# Patient Record
Sex: FEMALE | Race: WHITE | HISPANIC OR LATINO | URBAN - METROPOLITAN AREA
[De-identification: names, ages, dates, MRNs, and addresses within clinical notes are randomized per-mention and may not be internally consistent; named-entity substitution may affect disease eponyms.]

---

## 2017-01-30 ENCOUNTER — OUTPATIENT (OUTPATIENT)
Dept: OUTPATIENT SERVICES | Facility: HOSPITAL | Age: 52
LOS: 1 days | End: 2017-01-30
Payer: MEDICAID

## 2017-01-30 ENCOUNTER — APPOINTMENT (OUTPATIENT)
Dept: FAMILY MEDICINE | Facility: HOSPITAL | Age: 52
End: 2017-01-30

## 2017-01-30 VITALS
HEART RATE: 80 BPM | WEIGHT: 156 LBS | TEMPERATURE: 97.8 F | SYSTOLIC BLOOD PRESSURE: 129 MMHG | RESPIRATION RATE: 14 BRPM | DIASTOLIC BLOOD PRESSURE: 80 MMHG | BODY MASS INDEX: 25.18 KG/M2 | OXYGEN SATURATION: 100 %

## 2017-01-30 DIAGNOSIS — Z00.00 ENCOUNTER FOR GENERAL ADULT MEDICAL EXAMINATION WITHOUT ABNORMAL FINDINGS: ICD-10-CM

## 2017-01-30 PROCEDURE — G0463: CPT

## 2017-02-13 ENCOUNTER — EMERGENCY (EMERGENCY)
Facility: HOSPITAL | Age: 52
LOS: 1 days | Discharge: HOME | End: 2017-02-13
Admitting: EMERGENCY MEDICINE
Payer: COMMERCIAL

## 2017-02-13 ENCOUNTER — OTHER (OUTPATIENT)
Age: 52
End: 2017-02-13

## 2017-02-13 PROCEDURE — 81025 URINE PREGNANCY TEST: CPT

## 2017-02-13 PROCEDURE — 82272 OCCULT BLD FECES 1-3 TESTS: CPT

## 2017-02-13 PROCEDURE — 99284 EMERGENCY DEPT VISIT MOD MDM: CPT | Mod: 25

## 2017-02-13 PROCEDURE — 36415 COLL VENOUS BLD VENIPUNCTURE: CPT

## 2017-02-13 PROCEDURE — 99053 MED SERV 10PM-8AM 24 HR FAC: CPT

## 2017-02-13 PROCEDURE — 82962 GLUCOSE BLOOD TEST: CPT

## 2017-02-13 PROCEDURE — 70450 CT HEAD/BRAIN W/O DYE: CPT | Mod: 26

## 2017-02-13 PROCEDURE — 85610 PROTHROMBIN TIME: CPT

## 2017-02-13 PROCEDURE — 71010: CPT | Mod: 26

## 2017-02-13 PROCEDURE — 85730 THROMBOPLASTIN TIME PARTIAL: CPT

## 2017-02-13 PROCEDURE — 85027 COMPLETE CBC AUTOMATED: CPT

## 2017-02-13 PROCEDURE — 85379 FIBRIN DEGRADATION QUANT: CPT

## 2017-02-13 PROCEDURE — 70450 CT HEAD/BRAIN W/O DYE: CPT

## 2017-02-13 PROCEDURE — 93010 ELECTROCARDIOGRAM REPORT: CPT

## 2017-02-13 PROCEDURE — 82550 ASSAY OF CK (CPK): CPT

## 2017-02-13 PROCEDURE — 71045 X-RAY EXAM CHEST 1 VIEW: CPT

## 2017-02-13 PROCEDURE — 99285 EMERGENCY DEPT VISIT HI MDM: CPT | Mod: 25

## 2017-02-13 PROCEDURE — 84484 ASSAY OF TROPONIN QUANT: CPT

## 2017-02-13 PROCEDURE — 93005 ELECTROCARDIOGRAM TRACING: CPT

## 2017-02-14 ENCOUNTER — APPOINTMENT (OUTPATIENT)
Dept: FAMILY MEDICINE | Facility: HOSPITAL | Age: 52
End: 2017-02-14

## 2017-02-14 ENCOUNTER — OUTPATIENT (OUTPATIENT)
Dept: OUTPATIENT SERVICES | Facility: HOSPITAL | Age: 52
LOS: 1 days | End: 2017-02-14
Payer: MEDICAID

## 2017-02-14 VITALS
OXYGEN SATURATION: 100 % | TEMPERATURE: 97 F | WEIGHT: 155 LBS | RESPIRATION RATE: 14 BRPM | HEIGHT: 66 IN | HEART RATE: 61 BPM | SYSTOLIC BLOOD PRESSURE: 139 MMHG | DIASTOLIC BLOOD PRESSURE: 74 MMHG | BODY MASS INDEX: 24.91 KG/M2

## 2017-02-14 DIAGNOSIS — D64.9 ANEMIA, UNSPECIFIED: ICD-10-CM

## 2017-02-14 DIAGNOSIS — G51.0 BELL'S PALSY: ICD-10-CM

## 2017-02-14 DIAGNOSIS — Z82.3 FAMILY HISTORY OF STROKE: ICD-10-CM

## 2017-02-14 DIAGNOSIS — Z82.49 FAMILY HISTORY OF ISCHEMIC HEART DISEASE AND OTHER DISEASES OF THE CIRCULATORY SYSTEM: ICD-10-CM

## 2017-02-14 PROCEDURE — 83550 IRON BINDING TEST: CPT

## 2017-02-14 PROCEDURE — 82728 ASSAY OF FERRITIN: CPT

## 2017-02-14 PROCEDURE — 85025 COMPLETE CBC W/AUTO DIFF WBC: CPT

## 2017-02-14 PROCEDURE — 36415 COLL VENOUS BLD VENIPUNCTURE: CPT

## 2017-02-14 PROCEDURE — G0463: CPT

## 2017-02-15 ENCOUNTER — APPOINTMENT (OUTPATIENT)
Dept: OBGYN | Facility: HOSPITAL | Age: 52
End: 2017-02-15

## 2017-02-15 DIAGNOSIS — I16.1 HYPERTENSIVE EMERGENCY: ICD-10-CM

## 2017-02-15 DIAGNOSIS — I10 ESSENTIAL (PRIMARY) HYPERTENSION: ICD-10-CM

## 2017-03-02 ENCOUNTER — APPOINTMENT (OUTPATIENT)
Dept: FAMILY MEDICINE | Facility: HOSPITAL | Age: 52
End: 2017-03-02

## 2017-03-10 ENCOUNTER — APPOINTMENT (OUTPATIENT)
Dept: ULTRASOUND IMAGING | Facility: HOSPITAL | Age: 52
End: 2017-03-10

## 2017-03-10 ENCOUNTER — APPOINTMENT (OUTPATIENT)
Dept: MAMMOGRAPHY | Facility: HOSPITAL | Age: 52
End: 2017-03-10

## 2017-03-10 ENCOUNTER — OUTPATIENT (OUTPATIENT)
Dept: OUTPATIENT SERVICES | Facility: HOSPITAL | Age: 52
LOS: 1 days | End: 2017-03-10
Payer: MEDICAID

## 2017-03-10 DIAGNOSIS — Z12.31 ENCOUNTER FOR SCREENING MAMMOGRAM FOR MALIGNANT NEOPLASM OF BREAST: ICD-10-CM

## 2017-03-10 DIAGNOSIS — N60.11 DIFFUSE CYSTIC MASTOPATHY OF RIGHT BREAST: ICD-10-CM

## 2017-03-10 DIAGNOSIS — N60.12 DIFFUSE CYSTIC MASTOPATHY OF LEFT BREAST: ICD-10-CM

## 2017-03-10 PROCEDURE — 76641 ULTRASOUND BREAST COMPLETE: CPT

## 2017-03-10 PROCEDURE — 77063 BREAST TOMOSYNTHESIS BI: CPT

## 2017-03-10 PROCEDURE — 77067 SCR MAMMO BI INCL CAD: CPT

## 2017-03-16 ENCOUNTER — OUTPATIENT (OUTPATIENT)
Dept: OUTPATIENT SERVICES | Facility: HOSPITAL | Age: 52
LOS: 1 days | End: 2017-03-16
Payer: COMMERCIAL

## 2017-03-16 ENCOUNTER — APPOINTMENT (OUTPATIENT)
Dept: FAMILY MEDICINE | Facility: HOSPITAL | Age: 52
End: 2017-03-16

## 2017-03-16 VITALS
BODY MASS INDEX: 25.23 KG/M2 | HEIGHT: 66 IN | WEIGHT: 157 LBS | HEART RATE: 74 BPM | TEMPERATURE: 97.5 F | DIASTOLIC BLOOD PRESSURE: 82 MMHG | RESPIRATION RATE: 14 BRPM | OXYGEN SATURATION: 98 % | SYSTOLIC BLOOD PRESSURE: 137 MMHG

## 2017-03-16 DIAGNOSIS — Z00.00 ENCOUNTER FOR GENERAL ADULT MEDICAL EXAMINATION WITHOUT ABNORMAL FINDINGS: ICD-10-CM

## 2017-03-16 PROCEDURE — 87081 CULTURE SCREEN ONLY: CPT

## 2017-03-16 PROCEDURE — G0463: CPT

## 2017-03-28 ENCOUNTER — OUTPATIENT (OUTPATIENT)
Dept: OUTPATIENT SERVICES | Facility: HOSPITAL | Age: 52
LOS: 1 days | End: 2017-03-28
Payer: COMMERCIAL

## 2017-03-28 ENCOUNTER — APPOINTMENT (OUTPATIENT)
Dept: GASTROENTEROLOGY | Facility: HOSPITAL | Age: 52
End: 2017-03-28

## 2017-03-28 VITALS
BODY MASS INDEX: 25.23 KG/M2 | DIASTOLIC BLOOD PRESSURE: 98 MMHG | HEART RATE: 91 BPM | RESPIRATION RATE: 14 BRPM | WEIGHT: 157 LBS | HEIGHT: 66 IN | SYSTOLIC BLOOD PRESSURE: 163 MMHG

## 2017-03-28 DIAGNOSIS — D64.9 ANEMIA, UNSPECIFIED: ICD-10-CM

## 2017-03-28 DIAGNOSIS — K31.9 DISEASE OF STOMACH AND DUODENUM, UNSPECIFIED: ICD-10-CM

## 2017-03-28 PROCEDURE — G0463: CPT

## 2017-04-12 ENCOUNTER — RESULT REVIEW (OUTPATIENT)
Age: 52
End: 2017-04-12

## 2017-04-13 ENCOUNTER — OUTPATIENT (OUTPATIENT)
Dept: OUTPATIENT SERVICES | Facility: HOSPITAL | Age: 52
LOS: 1 days | End: 2017-04-13
Payer: COMMERCIAL

## 2017-04-13 DIAGNOSIS — D64.9 ANEMIA, UNSPECIFIED: ICD-10-CM

## 2017-04-13 PROCEDURE — 45378 DIAGNOSTIC COLONOSCOPY: CPT

## 2017-04-13 PROCEDURE — 43239 EGD BIOPSY SINGLE/MULTIPLE: CPT

## 2017-04-13 PROCEDURE — 43239 EGD BIOPSY SINGLE/MULTIPLE: CPT | Mod: GC

## 2017-04-13 PROCEDURE — 45378 DIAGNOSTIC COLONOSCOPY: CPT | Mod: GC

## 2017-04-14 LAB — SURGICAL PATHOLOGY STUDY: SIGNIFICANT CHANGE UP

## 2017-04-15 ENCOUNTER — OUTPATIENT (OUTPATIENT)
Dept: OUTPATIENT SERVICES | Facility: HOSPITAL | Age: 52
LOS: 1 days | End: 2017-04-15
Payer: COMMERCIAL

## 2017-04-15 ENCOUNTER — APPOINTMENT (OUTPATIENT)
Dept: FAMILY MEDICINE | Facility: HOSPITAL | Age: 52
End: 2017-04-15

## 2017-04-15 VITALS
WEIGHT: 157 LBS | HEIGHT: 63 IN | BODY MASS INDEX: 27.82 KG/M2 | TEMPERATURE: 97.8 F | HEART RATE: 58 BPM | DIASTOLIC BLOOD PRESSURE: 83 MMHG | RESPIRATION RATE: 14 BRPM | SYSTOLIC BLOOD PRESSURE: 159 MMHG | OXYGEN SATURATION: 100 %

## 2017-04-15 VITALS — DIASTOLIC BLOOD PRESSURE: 88 MMHG | SYSTOLIC BLOOD PRESSURE: 151 MMHG

## 2017-04-15 DIAGNOSIS — Z00.00 ENCOUNTER FOR GENERAL ADULT MEDICAL EXAMINATION WITHOUT ABNORMAL FINDINGS: ICD-10-CM

## 2017-04-15 PROCEDURE — G0463: CPT

## 2017-04-15 RX ORDER — DEXTROMETHORPHAN POLISTIREX 30 MG/5ML
30 SUSPENSION, EXTENDED RELEASE ORAL
Qty: 1 | Refills: 0 | Status: DISCONTINUED | COMMUNITY
Start: 2017-03-16 | End: 2017-04-15

## 2017-05-09 ENCOUNTER — OUTPATIENT (OUTPATIENT)
Dept: OUTPATIENT SERVICES | Facility: HOSPITAL | Age: 52
LOS: 1 days | End: 2017-05-09
Payer: COMMERCIAL

## 2017-05-09 ENCOUNTER — APPOINTMENT (OUTPATIENT)
Dept: CT IMAGING | Facility: HOSPITAL | Age: 52
End: 2017-05-09

## 2017-05-09 DIAGNOSIS — D25.9 LEIOMYOMA OF UTERUS, UNSPECIFIED: ICD-10-CM

## 2017-05-09 PROCEDURE — 74177 CT ABD & PELVIS W/CONTRAST: CPT

## 2017-06-12 ENCOUNTER — APPOINTMENT (OUTPATIENT)
Dept: FAMILY MEDICINE | Facility: HOSPITAL | Age: 52
End: 2017-06-12

## 2017-06-12 ENCOUNTER — OUTPATIENT (OUTPATIENT)
Dept: OUTPATIENT SERVICES | Facility: HOSPITAL | Age: 52
LOS: 1 days | End: 2017-06-12
Payer: COMMERCIAL

## 2017-06-12 VITALS
TEMPERATURE: 97.7 F | OXYGEN SATURATION: 99 % | BODY MASS INDEX: 29.23 KG/M2 | HEART RATE: 79 BPM | WEIGHT: 165 LBS | SYSTOLIC BLOOD PRESSURE: 166 MMHG | DIASTOLIC BLOOD PRESSURE: 79 MMHG | RESPIRATION RATE: 16 BRPM

## 2017-06-12 DIAGNOSIS — R60.0 LOCALIZED EDEMA: ICD-10-CM

## 2017-06-12 DIAGNOSIS — D64.9 ANEMIA, UNSPECIFIED: ICD-10-CM

## 2017-06-13 PROCEDURE — 82728 ASSAY OF FERRITIN: CPT

## 2017-06-13 PROCEDURE — 80053 COMPREHEN METABOLIC PANEL: CPT

## 2017-06-13 PROCEDURE — 83921 ORGANIC ACID SINGLE QUANT: CPT

## 2017-06-13 PROCEDURE — 84443 ASSAY THYROID STIM HORMONE: CPT

## 2017-06-13 PROCEDURE — 36415 COLL VENOUS BLD VENIPUNCTURE: CPT

## 2017-06-13 PROCEDURE — 84439 ASSAY OF FREE THYROXINE: CPT

## 2017-06-13 PROCEDURE — 82746 ASSAY OF FOLIC ACID SERUM: CPT

## 2017-06-13 PROCEDURE — 83550 IRON BINDING TEST: CPT

## 2017-06-13 PROCEDURE — 93306 TTE W/DOPPLER COMPLETE: CPT

## 2017-06-13 PROCEDURE — 82607 VITAMIN B-12: CPT

## 2017-06-13 PROCEDURE — 85045 AUTOMATED RETICULOCYTE COUNT: CPT

## 2017-06-13 PROCEDURE — 85025 COMPLETE CBC W/AUTO DIFF WBC: CPT

## 2017-06-13 PROCEDURE — G0463: CPT

## 2017-06-13 PROCEDURE — 83090 ASSAY OF HOMOCYSTEINE: CPT

## 2017-06-13 PROCEDURE — 80061 LIPID PANEL: CPT

## 2017-06-13 PROCEDURE — 83036 HEMOGLOBIN GLYCOSYLATED A1C: CPT

## 2017-06-19 ENCOUNTER — APPOINTMENT (OUTPATIENT)
Dept: FAMILY MEDICINE | Facility: HOSPITAL | Age: 52
End: 2017-06-19

## 2017-06-19 ENCOUNTER — OUTPATIENT (OUTPATIENT)
Dept: OUTPATIENT SERVICES | Facility: HOSPITAL | Age: 52
LOS: 1 days | End: 2017-06-19
Payer: COMMERCIAL

## 2017-06-19 VITALS
RESPIRATION RATE: 16 BRPM | DIASTOLIC BLOOD PRESSURE: 78 MMHG | SYSTOLIC BLOOD PRESSURE: 125 MMHG | WEIGHT: 161 LBS | HEIGHT: 63 IN | BODY MASS INDEX: 28.53 KG/M2 | OXYGEN SATURATION: 99 % | TEMPERATURE: 97 F | HEART RATE: 84 BPM

## 2017-06-19 DIAGNOSIS — D64.9 ANEMIA, UNSPECIFIED: ICD-10-CM

## 2017-06-20 PROCEDURE — 80074 ACUTE HEPATITIS PANEL: CPT

## 2017-06-20 PROCEDURE — 87389 HIV-1 AG W/HIV-1&-2 AB AG IA: CPT

## 2017-06-20 PROCEDURE — 36415 COLL VENOUS BLD VENIPUNCTURE: CPT

## 2017-06-20 PROCEDURE — G0463: CPT

## 2017-07-12 ENCOUNTER — OUTPATIENT (OUTPATIENT)
Dept: OUTPATIENT SERVICES | Facility: HOSPITAL | Age: 52
LOS: 1 days | End: 2017-07-12
Payer: COMMERCIAL

## 2017-07-12 ENCOUNTER — APPOINTMENT (OUTPATIENT)
Dept: FAMILY MEDICINE | Facility: HOSPITAL | Age: 52
End: 2017-07-12

## 2017-07-12 VITALS
SYSTOLIC BLOOD PRESSURE: 136 MMHG | HEART RATE: 70 BPM | WEIGHT: 161 LBS | TEMPERATURE: 98 F | DIASTOLIC BLOOD PRESSURE: 89 MMHG | RESPIRATION RATE: 16 BRPM | BODY MASS INDEX: 28.53 KG/M2 | HEIGHT: 63 IN | OXYGEN SATURATION: 99 %

## 2017-07-12 DIAGNOSIS — N60.01 SOLITARY CYST OF RIGHT BREAST: ICD-10-CM

## 2017-07-12 PROCEDURE — G0463: CPT

## 2017-07-18 ENCOUNTER — APPOINTMENT (OUTPATIENT)
Dept: FAMILY MEDICINE | Facility: HOSPITAL | Age: 52
End: 2017-07-18

## 2017-07-18 RX ORDER — VALSARTAN 160 MG/1
160 TABLET, COATED ORAL DAILY
Qty: 30 | Refills: 0 | Status: DISCONTINUED | COMMUNITY
Start: 2017-07-12 | End: 2017-07-18

## 2017-07-25 ENCOUNTER — APPOINTMENT (OUTPATIENT)
Dept: SURGICAL ONCOLOGY | Facility: CLINIC | Age: 52
End: 2017-07-25

## 2017-07-25 VITALS
HEART RATE: 61 BPM | SYSTOLIC BLOOD PRESSURE: 162 MMHG | DIASTOLIC BLOOD PRESSURE: 80 MMHG | HEIGHT: 64 IN | WEIGHT: 157 LBS | BODY MASS INDEX: 26.8 KG/M2 | OXYGEN SATURATION: 100 %

## 2017-07-25 DIAGNOSIS — N60.01 SOLITARY CYST OF RIGHT BREAST: ICD-10-CM

## 2017-09-06 ENCOUNTER — APPOINTMENT (OUTPATIENT)
Dept: FAMILY MEDICINE | Facility: HOSPITAL | Age: 52
End: 2017-09-06

## 2017-09-06 ENCOUNTER — OUTPATIENT (OUTPATIENT)
Dept: OUTPATIENT SERVICES | Facility: HOSPITAL | Age: 52
LOS: 1 days | End: 2017-09-06
Payer: COMMERCIAL

## 2017-09-06 ENCOUNTER — MED ADMIN CHARGE (OUTPATIENT)
Age: 52
End: 2017-09-06

## 2017-09-06 VITALS
RESPIRATION RATE: 14 BRPM | OXYGEN SATURATION: 98 % | SYSTOLIC BLOOD PRESSURE: 170 MMHG | BODY MASS INDEX: 29.02 KG/M2 | WEIGHT: 170 LBS | DIASTOLIC BLOOD PRESSURE: 107 MMHG | HEIGHT: 64 IN | HEART RATE: 63 BPM | TEMPERATURE: 97.5 F

## 2017-09-06 DIAGNOSIS — R60.0 LOCALIZED EDEMA: ICD-10-CM

## 2017-09-06 DIAGNOSIS — Z23 ENCOUNTER FOR IMMUNIZATION: ICD-10-CM

## 2017-09-06 DIAGNOSIS — M94.0 CHONDROCOSTAL JUNCTION SYNDROME [TIETZE]: ICD-10-CM

## 2017-09-06 PROCEDURE — G0463: CPT

## 2017-09-06 PROCEDURE — G0008: CPT

## 2017-10-23 ENCOUNTER — APPOINTMENT (OUTPATIENT)
Dept: FAMILY MEDICINE | Facility: HOSPITAL | Age: 52
End: 2017-10-23

## 2017-10-26 ENCOUNTER — RX RENEWAL (OUTPATIENT)
Age: 52
End: 2017-10-26

## 2017-11-08 ENCOUNTER — APPOINTMENT (OUTPATIENT)
Dept: FAMILY MEDICINE | Facility: HOSPITAL | Age: 52
End: 2017-11-08

## 2017-12-07 ENCOUNTER — EMERGENCY (EMERGENCY)
Facility: HOSPITAL | Age: 52
LOS: 1 days | Discharge: HOME | End: 2017-12-07
Admitting: EMERGENCY MEDICINE
Payer: COMMERCIAL

## 2017-12-07 DIAGNOSIS — Z90.49 ACQUIRED ABSENCE OF OTHER SPECIFIED PARTS OF DIGESTIVE TRACT: ICD-10-CM

## 2017-12-07 DIAGNOSIS — K21.9 GASTRO-ESOPHAGEAL REFLUX DISEASE WITHOUT ESOPHAGITIS: ICD-10-CM

## 2017-12-07 DIAGNOSIS — Z98.890 OTHER SPECIFIED POSTPROCEDURAL STATES: ICD-10-CM

## 2017-12-07 DIAGNOSIS — Z79.899 OTHER LONG TERM (CURRENT) DRUG THERAPY: ICD-10-CM

## 2017-12-07 DIAGNOSIS — J20.8 ACUTE BRONCHITIS DUE TO OTHER SPECIFIED ORGANISMS: ICD-10-CM

## 2017-12-07 DIAGNOSIS — Z88.8 ALLERGY STATUS TO OTHER DRUGS, MEDICAMENTS AND BIOLOGICAL SUBSTANCES: ICD-10-CM

## 2017-12-07 DIAGNOSIS — R51 HEADACHE: ICD-10-CM

## 2017-12-07 DIAGNOSIS — J06.9 ACUTE UPPER RESPIRATORY INFECTION, UNSPECIFIED: ICD-10-CM

## 2017-12-07 DIAGNOSIS — I10 ESSENTIAL (PRIMARY) HYPERTENSION: ICD-10-CM

## 2017-12-07 PROCEDURE — 71046 X-RAY EXAM CHEST 2 VIEWS: CPT

## 2017-12-07 PROCEDURE — 71020: CPT | Mod: 26

## 2017-12-07 PROCEDURE — 99284 EMERGENCY DEPT VISIT MOD MDM: CPT | Mod: 25

## 2017-12-07 PROCEDURE — 99284 EMERGENCY DEPT VISIT MOD MDM: CPT

## 2018-01-10 ENCOUNTER — OUTPATIENT (OUTPATIENT)
Dept: OUTPATIENT SERVICES | Facility: HOSPITAL | Age: 53
LOS: 1 days | End: 2018-01-10
Payer: COMMERCIAL

## 2018-01-10 ENCOUNTER — APPOINTMENT (OUTPATIENT)
Dept: FAMILY MEDICINE | Facility: HOSPITAL | Age: 53
End: 2018-01-10

## 2018-01-10 VITALS
BODY MASS INDEX: 28.15 KG/M2 | WEIGHT: 164 LBS | DIASTOLIC BLOOD PRESSURE: 90 MMHG | RESPIRATION RATE: 12 BRPM | TEMPERATURE: 98.1 F | SYSTOLIC BLOOD PRESSURE: 162 MMHG | OXYGEN SATURATION: 100 % | HEART RATE: 63 BPM

## 2018-01-10 DIAGNOSIS — Z00.00 ENCOUNTER FOR GENERAL ADULT MEDICAL EXAMINATION WITHOUT ABNORMAL FINDINGS: ICD-10-CM

## 2018-01-10 DIAGNOSIS — D25.9 LEIOMYOMA OF UTERUS, UNSPECIFIED: ICD-10-CM

## 2018-01-10 PROCEDURE — G0463: CPT

## 2018-01-10 RX ORDER — OMEPRAZOLE 40 MG/1
40 CAPSULE, DELAYED RELEASE ORAL
Qty: 1 | Refills: 1 | Status: DISCONTINUED | COMMUNITY
Start: 2017-02-14 | End: 2018-01-10

## 2018-01-10 RX ORDER — NAPROXEN 500 MG/1
500 TABLET ORAL
Qty: 20 | Refills: 0 | Status: DISCONTINUED | COMMUNITY
Start: 2017-09-06 | End: 2018-01-10

## 2018-01-11 DIAGNOSIS — D25.9 LEIOMYOMA OF UTERUS, UNSPECIFIED: ICD-10-CM

## 2018-01-11 DIAGNOSIS — I10 ESSENTIAL (PRIMARY) HYPERTENSION: ICD-10-CM

## 2018-01-15 ENCOUNTER — FORM ENCOUNTER (OUTPATIENT)
Age: 53
End: 2018-01-15

## 2018-01-16 ENCOUNTER — OUTPATIENT (OUTPATIENT)
Dept: OUTPATIENT SERVICES | Facility: HOSPITAL | Age: 53
LOS: 1 days | End: 2018-01-16
Payer: COMMERCIAL

## 2018-01-16 DIAGNOSIS — D25.9 LEIOMYOMA OF UTERUS, UNSPECIFIED: ICD-10-CM

## 2018-01-16 DIAGNOSIS — D64.9 ANEMIA, UNSPECIFIED: ICD-10-CM

## 2018-01-16 PROCEDURE — 76830 TRANSVAGINAL US NON-OB: CPT | Mod: 26

## 2018-01-16 PROCEDURE — 76856 US EXAM PELVIC COMPLETE: CPT

## 2018-01-16 PROCEDURE — 85025 COMPLETE CBC W/AUTO DIFF WBC: CPT

## 2018-01-16 PROCEDURE — 76856 US EXAM PELVIC COMPLETE: CPT | Mod: 26

## 2018-01-16 PROCEDURE — 36415 COLL VENOUS BLD VENIPUNCTURE: CPT

## 2018-01-16 PROCEDURE — 76830 TRANSVAGINAL US NON-OB: CPT

## 2018-02-05 ENCOUNTER — OUTPATIENT (OUTPATIENT)
Dept: OUTPATIENT SERVICES | Facility: HOSPITAL | Age: 53
LOS: 1 days | End: 2018-02-05
Payer: COMMERCIAL

## 2018-02-05 ENCOUNTER — APPOINTMENT (OUTPATIENT)
Dept: FAMILY MEDICINE | Facility: HOSPITAL | Age: 53
End: 2018-02-05

## 2018-02-05 VITALS
SYSTOLIC BLOOD PRESSURE: 181 MMHG | WEIGHT: 164 LBS | OXYGEN SATURATION: 100 % | TEMPERATURE: 97.5 F | RESPIRATION RATE: 12 BRPM | BODY MASS INDEX: 28.15 KG/M2 | HEART RATE: 64 BPM | DIASTOLIC BLOOD PRESSURE: 92 MMHG

## 2018-02-05 DIAGNOSIS — Z00.00 ENCOUNTER FOR GENERAL ADULT MEDICAL EXAMINATION WITHOUT ABNORMAL FINDINGS: ICD-10-CM

## 2018-02-05 DIAGNOSIS — D64.9 ANEMIA, UNSPECIFIED: ICD-10-CM

## 2018-02-05 PROCEDURE — 82728 ASSAY OF FERRITIN: CPT

## 2018-02-05 PROCEDURE — 84466 ASSAY OF TRANSFERRIN: CPT

## 2018-02-05 PROCEDURE — G0463: CPT

## 2018-02-05 PROCEDURE — 83550 IRON BINDING TEST: CPT

## 2018-02-05 RX ORDER — AZITHROMYCIN 250 MG/1
250 TABLET, FILM COATED ORAL
Qty: 6 | Refills: 0 | Status: COMPLETED | COMMUNITY
Start: 2017-12-07

## 2018-02-05 RX ORDER — BENZONATATE 100 MG/1
100 CAPSULE ORAL
Qty: 21 | Refills: 0 | Status: COMPLETED | COMMUNITY
Start: 2017-12-07

## 2018-02-05 RX ORDER — IBUPROFEN 600 MG/1
600 TABLET, FILM COATED ORAL
Qty: 30 | Refills: 0 | Status: COMPLETED | COMMUNITY
Start: 2017-12-07

## 2018-03-08 ENCOUNTER — MEDICATION RENEWAL (OUTPATIENT)
Age: 53
End: 2018-03-08

## 2018-03-12 ENCOUNTER — APPOINTMENT (OUTPATIENT)
Dept: OBGYN | Facility: HOSPITAL | Age: 53
End: 2018-03-12

## 2018-03-28 ENCOUNTER — APPOINTMENT (OUTPATIENT)
Dept: OBGYN | Facility: HOSPITAL | Age: 53
End: 2018-03-28
Payer: COMMERCIAL

## 2018-03-28 ENCOUNTER — LABORATORY RESULT (OUTPATIENT)
Age: 53
End: 2018-03-28

## 2018-03-28 ENCOUNTER — OUTPATIENT (OUTPATIENT)
Dept: OUTPATIENT SERVICES | Facility: HOSPITAL | Age: 53
LOS: 1 days | End: 2018-03-28

## 2018-03-28 VITALS
HEART RATE: 87 BPM | BODY MASS INDEX: 29.06 KG/M2 | DIASTOLIC BLOOD PRESSURE: 88 MMHG | WEIGHT: 170.19 LBS | HEIGHT: 64 IN | SYSTOLIC BLOOD PRESSURE: 158 MMHG

## 2018-03-28 DIAGNOSIS — Z01.419 ENCOUNTER FOR GYNECOLOGICAL EXAMINATION (GENERAL) (ROUTINE) WITHOUT ABNORMAL FINDINGS: ICD-10-CM

## 2018-03-28 PROCEDURE — 87110 CHLAMYDIA CULTURE: CPT | Mod: NC

## 2018-03-28 PROCEDURE — 99396 PREV VISIT EST AGE 40-64: CPT | Mod: NC

## 2018-03-29 LAB
C TRACH RRNA SPEC QL NAA+PROBE: SIGNIFICANT CHANGE UP
N GONORRHOEA RRNA SPEC QL NAA+PROBE: SIGNIFICANT CHANGE UP
SPECIMEN SOURCE: SIGNIFICANT CHANGE UP

## 2018-04-03 ENCOUNTER — RX RENEWAL (OUTPATIENT)
Age: 53
End: 2018-04-03

## 2018-05-14 ENCOUNTER — RX RENEWAL (OUTPATIENT)
Age: 53
End: 2018-05-14

## 2018-06-06 ENCOUNTER — APPOINTMENT (OUTPATIENT)
Dept: FAMILY MEDICINE | Facility: HOSPITAL | Age: 53
End: 2018-06-06

## 2018-06-18 ENCOUNTER — RX RENEWAL (OUTPATIENT)
Age: 53
End: 2018-06-18

## 2018-07-31 ENCOUNTER — APPOINTMENT (OUTPATIENT)
Dept: SURGICAL ONCOLOGY | Facility: CLINIC | Age: 53
End: 2018-07-31

## 2018-09-14 ENCOUNTER — EMERGENCY (EMERGENCY)
Facility: HOSPITAL | Age: 53
LOS: 1 days | Discharge: HOME | End: 2018-09-14
Attending: EMERGENCY MEDICINE | Admitting: EMERGENCY MEDICINE
Payer: COMMERCIAL

## 2018-09-14 VITALS
HEIGHT: 64 IN | WEIGHT: 164.02 LBS | TEMPERATURE: 98 F | RESPIRATION RATE: 18 BRPM | OXYGEN SATURATION: 100 % | DIASTOLIC BLOOD PRESSURE: 95 MMHG | HEART RATE: 57 BPM | SYSTOLIC BLOOD PRESSURE: 179 MMHG

## 2018-09-14 VITALS
SYSTOLIC BLOOD PRESSURE: 168 MMHG | HEART RATE: 60 BPM | RESPIRATION RATE: 18 BRPM | OXYGEN SATURATION: 100 % | DIASTOLIC BLOOD PRESSURE: 78 MMHG

## 2018-09-14 DIAGNOSIS — R11.10 VOMITING, UNSPECIFIED: ICD-10-CM

## 2018-09-14 LAB
ALBUMIN SERPL ELPH-MCNC: 3.7 G/DL — SIGNIFICANT CHANGE UP (ref 3.3–5)
ALP SERPL-CCNC: 122 U/L — HIGH (ref 40–120)
ALT FLD-CCNC: 38 U/L DA — SIGNIFICANT CHANGE UP (ref 10–45)
ANION GAP SERPL CALC-SCNC: 11 MMOL/L — SIGNIFICANT CHANGE UP (ref 5–17)
AST SERPL-CCNC: 33 U/L — SIGNIFICANT CHANGE UP (ref 10–40)
BASOPHILS # BLD AUTO: 0.1 K/UL — SIGNIFICANT CHANGE UP (ref 0–0.2)
BILIRUB SERPL-MCNC: 0.5 MG/DL — SIGNIFICANT CHANGE UP (ref 0.2–1.2)
BUN SERPL-MCNC: 22 MG/DL — SIGNIFICANT CHANGE UP (ref 7–23)
CALCIUM SERPL-MCNC: 9.3 MG/DL — SIGNIFICANT CHANGE UP (ref 8.4–10.5)
CHLORIDE SERPL-SCNC: 103 MMOL/L — SIGNIFICANT CHANGE UP (ref 96–108)
CO2 SERPL-SCNC: 25 MMOL/L — SIGNIFICANT CHANGE UP (ref 22–31)
CREAT SERPL-MCNC: 0.64 MG/DL — SIGNIFICANT CHANGE UP (ref 0.5–1.3)
EOSINOPHIL # BLD AUTO: 0.3 K/UL — SIGNIFICANT CHANGE UP (ref 0–0.5)
EOSINOPHIL NFR BLD AUTO: 5 % — SIGNIFICANT CHANGE UP (ref 0–6)
GLUCOSE SERPL-MCNC: 138 MG/DL — HIGH (ref 70–99)
HCT VFR BLD CALC: 46.1 % — HIGH (ref 34.5–45)
HGB BLD-MCNC: 15.9 G/DL — HIGH (ref 11.5–15.5)
LIDOCAIN IGE QN: 652 U/L — HIGH (ref 73–393)
LYMPHOCYTES # BLD AUTO: 3.6 K/UL — HIGH (ref 1–3.3)
LYMPHOCYTES # BLD AUTO: 37 % — SIGNIFICANT CHANGE UP (ref 13–44)
MCHC RBC-ENTMCNC: 29.4 PG — SIGNIFICANT CHANGE UP (ref 27–34)
MCHC RBC-ENTMCNC: 34.4 GM/DL — SIGNIFICANT CHANGE UP (ref 32–36)
MCV RBC AUTO: 85.6 FL — SIGNIFICANT CHANGE UP (ref 80–100)
MONOCYTES # BLD AUTO: 0.8 K/UL — SIGNIFICANT CHANGE UP (ref 0–0.9)
MONOCYTES NFR BLD AUTO: 8 % — SIGNIFICANT CHANGE UP (ref 2–14)
NEUTROPHILS # BLD AUTO: 4.9 K/UL — SIGNIFICANT CHANGE UP (ref 1.8–7.4)
NEUTROPHILS NFR BLD AUTO: 49 % — SIGNIFICANT CHANGE UP (ref 43–77)
NEUTS BAND # BLD: 1 % — SIGNIFICANT CHANGE UP (ref 0–8)
NRBC # BLD: 1 /100 — HIGH (ref 0–0)
PLAT MORPH BLD: NORMAL — SIGNIFICANT CHANGE UP
PLATELET # BLD AUTO: 347 K/UL — SIGNIFICANT CHANGE UP (ref 150–400)
POTASSIUM SERPL-MCNC: 3.6 MMOL/L — SIGNIFICANT CHANGE UP (ref 3.5–5.3)
POTASSIUM SERPL-SCNC: 3.6 MMOL/L — SIGNIFICANT CHANGE UP (ref 3.5–5.3)
PROT SERPL-MCNC: 8.1 G/DL — SIGNIFICANT CHANGE UP (ref 6–8.3)
RBC # BLD: 5.39 M/UL — HIGH (ref 3.8–5.2)
RBC # FLD: 11.4 % — SIGNIFICANT CHANGE UP (ref 10.3–14.5)
RBC BLD AUTO: NORMAL — SIGNIFICANT CHANGE UP
SODIUM SERPL-SCNC: 139 MMOL/L — SIGNIFICANT CHANGE UP (ref 135–145)
WBC # BLD: 9.6 K/UL — SIGNIFICANT CHANGE UP (ref 3.8–10.5)
WBC # FLD AUTO: 9.6 K/UL — SIGNIFICANT CHANGE UP (ref 3.8–10.5)

## 2018-09-14 PROCEDURE — 99284 EMERGENCY DEPT VISIT MOD MDM: CPT | Mod: 25

## 2018-09-14 PROCEDURE — 96374 THER/PROPH/DIAG INJ IV PUSH: CPT | Mod: XU

## 2018-09-14 PROCEDURE — 80053 COMPREHEN METABOLIC PANEL: CPT

## 2018-09-14 PROCEDURE — 70450 CT HEAD/BRAIN W/O DYE: CPT | Mod: 26

## 2018-09-14 PROCEDURE — 96361 HYDRATE IV INFUSION ADD-ON: CPT

## 2018-09-14 PROCEDURE — 96375 TX/PRO/DX INJ NEW DRUG ADDON: CPT

## 2018-09-14 PROCEDURE — 85027 COMPLETE CBC AUTOMATED: CPT

## 2018-09-14 PROCEDURE — 70450 CT HEAD/BRAIN W/O DYE: CPT

## 2018-09-14 PROCEDURE — 83690 ASSAY OF LIPASE: CPT

## 2018-09-14 RX ORDER — ONDANSETRON 8 MG/1
4 TABLET, FILM COATED ORAL ONCE
Qty: 0 | Refills: 0 | Status: COMPLETED | OUTPATIENT
Start: 2018-09-14 | End: 2018-09-14

## 2018-09-14 RX ORDER — DIPHENHYDRAMINE HCL 50 MG
25 CAPSULE ORAL ONCE
Qty: 0 | Refills: 0 | Status: COMPLETED | OUTPATIENT
Start: 2018-09-14 | End: 2018-09-14

## 2018-09-14 RX ORDER — SODIUM CHLORIDE 9 MG/ML
1000 INJECTION INTRAMUSCULAR; INTRAVENOUS; SUBCUTANEOUS ONCE
Qty: 0 | Refills: 0 | Status: COMPLETED | OUTPATIENT
Start: 2018-09-14 | End: 2018-09-14

## 2018-09-14 RX ADMIN — Medication 25 MILLIGRAM(S): at 02:43

## 2018-09-14 RX ADMIN — SODIUM CHLORIDE 1000 MILLILITER(S): 9 INJECTION INTRAMUSCULAR; INTRAVENOUS; SUBCUTANEOUS at 03:30

## 2018-09-14 RX ADMIN — SODIUM CHLORIDE 2000 MILLILITER(S): 9 INJECTION INTRAMUSCULAR; INTRAVENOUS; SUBCUTANEOUS at 02:43

## 2018-09-14 RX ADMIN — ONDANSETRON 4 MILLIGRAM(S): 8 TABLET, FILM COATED ORAL at 02:43

## 2018-09-14 NOTE — ED PROVIDER NOTE - OBJECTIVE STATEMENT
Pertinent PMH/PSH/FHx/SHx and Review of Systems contained within:  52 y/o F with h/o HTN presents to ed c/o sudden onset dizziness with multiple episodes of vomiting

## 2018-09-14 NOTE — ED ADULT NURSE NOTE - NSIMPLEMENTINTERV_GEN_ALL_ED
Implemented All Universal Safety Interventions:  [place] to call system. Call bell, personal items and telephone within reach. Instruct patient to call for assistance. Room bathroom lighting operational. Non-slip footwear when patient is off stretcher. Physically safe environment: no spills, clutter or unnecessary equipment. Stretcher in lowest position, wheels locked, appropriate side rails in place.

## 2018-09-14 NOTE — ED ADULT NURSE NOTE - OBJECTIVE STATEMENT
C/O vomiting and dizziness that started 1 hour ago. Pt has a hx of vertigo. Pt states she also had a headache. She took Tylenol extra strength pain reliever, headache has subsided.

## 2018-10-02 ENCOUNTER — RX RENEWAL (OUTPATIENT)
Age: 53
End: 2018-10-02

## 2018-11-26 ENCOUNTER — OUTPATIENT (OUTPATIENT)
Dept: OUTPATIENT SERVICES | Facility: HOSPITAL | Age: 53
LOS: 1 days | End: 2018-11-26
Payer: COMMERCIAL

## 2018-11-26 ENCOUNTER — APPOINTMENT (OUTPATIENT)
Dept: FAMILY MEDICINE | Facility: HOSPITAL | Age: 53
End: 2018-11-26

## 2018-11-26 VITALS
BODY MASS INDEX: 27.64 KG/M2 | SYSTOLIC BLOOD PRESSURE: 164 MMHG | TEMPERATURE: 97.9 F | OXYGEN SATURATION: 100 % | DIASTOLIC BLOOD PRESSURE: 101 MMHG | WEIGHT: 161 LBS | HEART RATE: 75 BPM

## 2018-11-26 VITALS — SYSTOLIC BLOOD PRESSURE: 154 MMHG | DIASTOLIC BLOOD PRESSURE: 100 MMHG

## 2018-11-26 DIAGNOSIS — Z00.00 ENCOUNTER FOR GENERAL ADULT MEDICAL EXAMINATION WITHOUT ABNORMAL FINDINGS: ICD-10-CM

## 2018-11-26 PROCEDURE — G0463: CPT

## 2018-11-26 NOTE — HEALTH RISK ASSESSMENT
[No falls in past year] : Patient reported no falls in the past year [0] : 2) Feeling down, depressed, or hopeless: Not at all (0) [] : No [[id number]] : 0

## 2018-11-26 NOTE — HISTORY OF PRESENT ILLNESS
[FreeTextEntry8] : 52 y/o female with a PMH of HTN, HLD, and iron-deficiency anemia presenting with a 9 day history of headaches. States that she has 2-3 headaches per day, and that they last for a few seconds before resolving on their own. The pain is located in her bilateral temples and also bilaterally in occiput. Associated with bilateral lacrimation, jaw pain, pressure "behind eyes", and vision changes, which were described as a blackness the covers the field of vision and then goes away, followed by blurry vision and difficulty reading. Denies any photophobia, phonophobia, nausea, vomiting, dizziness, or vertigo. States that these headaches are new and different from the headaches she usually has had since age 27.  States that she is compliant with both of her home HTN medications, and took both medications this morning, as she has kept unused old prescriptions. Wears reading glasses, and last saw an ophthalmologist 5 years ago.  [FreeTextEntry2] : Shima Paris OMS IV

## 2018-11-26 NOTE — REVIEW OF SYSTEMS
[Vision Problems] : vision problems [Headache] : headache [Negative] : Heme/Lymph [Discharge] : no discharge [Pain] : no pain [Redness] : no redness [Itching] : no itching [Nosebleed] : no nosebleeds [Dizziness] : no dizziness [Fainting] : no fainting [Confusion] : no confusion [Memory Loss] : no memory loss [Unsteady Walking] : no ataxia

## 2018-11-26 NOTE — ASSESSMENT
[FreeTextEntry1] : 54 y/o  female presenting with headache for 9 days. \par HTN-induced (due to compliance issues) vs. migraine vs. tension vs. less likely cluster\par No evidence of increased intercranial pressure or mass effect.\par \par # HTN headache\par - better control HTN\par - refill losartan and chlorothalidone and discussed importance of using both medications. \par - gave counseling on diet and exercise-decrease sodium intake, incorporate more fruits and vegetables into diet, - eat more lean meats like chicken and fish, eat less processed and deli meat, and exercise for 150 minutes a week. \par - gave a referral to opthalmology to assess if reading prescription needs to be changed, or if there are structural abnormalities of the eye.\par - RTC in 1 week to repeat blood pressure and assess life style modifications. \par - Counseled to go to ER if has worse headache of life, chest pain, SOB, or pain worsens. \par \par case discussed with Dr. Anguiano\par \par

## 2018-11-26 NOTE — PHYSICAL EXAM
[No Acute Distress] : no acute distress [Well Nourished] : well nourished [Well Developed] : well developed [PERRL] : pupils equal round and reactive to light [EOMI] : extraocular movements intact [Fundoscopic Exam Performed] : fundoscopic ~T exam ~C was performed [Normal Outer Ear/Nose] : the outer ears and nose were normal in appearance [Normal TMs] : both tympanic membranes were normal [No Respiratory Distress] : no respiratory distress  [Clear to Auscultation] : lungs were clear to auscultation bilaterally [No Accessory Muscle Use] : no accessory muscle use [Normal Rate] : normal rate  [Regular Rhythm] : with a regular rhythm [Normal S1, S2] : normal S1 and S2 [No Murmur] : no murmur heard [No Edema] : there was no peripheral edema [Coordination Grossly Intact] : coordination grossly intact [No Focal Deficits] : no focal deficits [Normal Affect] : the affect was normal [Normal Insight/Judgement] : insight and judgment were intact [de-identified] : obese [de-identified] : No cotton wool spots, cherry red spots or papilledema

## 2018-11-26 NOTE — COUNSELING
[Weight management counseling provided] : Weight management [Healthy eating counseling provided] : healthy eating [Activity counseling provided] : activity [Keep Food Diary] : Keep food diary [Low Fat Diet] : Low fat diet [Decrease Portions] : Decrease food portions [Low Salt Diet] : Low salt diet

## 2018-11-30 DIAGNOSIS — I10 ESSENTIAL (PRIMARY) HYPERTENSION: ICD-10-CM

## 2018-11-30 DIAGNOSIS — R51 HEADACHE: ICD-10-CM

## 2018-12-06 ENCOUNTER — APPOINTMENT (OUTPATIENT)
Dept: FAMILY MEDICINE | Facility: HOSPITAL | Age: 53
End: 2018-12-06

## 2019-01-24 ENCOUNTER — RX RENEWAL (OUTPATIENT)
Age: 54
End: 2019-01-24

## 2019-02-21 ENCOUNTER — APPOINTMENT (OUTPATIENT)
Age: 54
End: 2019-02-21

## 2019-03-01 ENCOUNTER — APPOINTMENT (OUTPATIENT)
Age: 54
End: 2019-03-01
Payer: COMMERCIAL

## 2019-03-01 ENCOUNTER — OUTPATIENT (OUTPATIENT)
Dept: OUTPATIENT SERVICES | Facility: HOSPITAL | Age: 54
LOS: 1 days | End: 2019-03-01
Payer: COMMERCIAL

## 2019-03-01 VITALS
HEIGHT: 64 IN | WEIGHT: 162 LBS | TEMPERATURE: 97.6 F | BODY MASS INDEX: 27.66 KG/M2 | SYSTOLIC BLOOD PRESSURE: 193 MMHG | DIASTOLIC BLOOD PRESSURE: 76 MMHG | OXYGEN SATURATION: 100 % | HEART RATE: 64 BPM | RESPIRATION RATE: 14 BRPM

## 2019-03-01 DIAGNOSIS — Z00.00 ENCOUNTER FOR GENERAL ADULT MEDICAL EXAMINATION W/OUT ABNORMAL FINDINGS: ICD-10-CM

## 2019-03-01 DIAGNOSIS — Z00.00 ENCOUNTER FOR GENERAL ADULT MEDICAL EXAMINATION WITHOUT ABNORMAL FINDINGS: ICD-10-CM

## 2019-03-01 NOTE — PLAN
[FreeTextEntry1] : 53F as above\par \par # Back Pain\par - Spurling's maneuver inconclusive\par - Cyclobenzaprine- can make you drowsy. Do not take if you are about to operate heavy machinery, cook, babysit or engage in activity that requires your attention.\par - Ibuprofen- take with food\par - Thoracolumbar xray \par - RTC in 2 weeks \par - if you experience numbness/tingling/weakness in your extremities, bowel/bladder incontinence/retention, saddle anaesthesia, please go to the emergency room immediately.\par \par #Iron deficiency Anemia\par - Renewed iron and vitamin C\par \par #HCM\par - FIT\par - Mammogram\par - Influenza vaccine today\par \par Discussed w/Dr. Anaya\par

## 2019-03-01 NOTE — COUNSELING
[Weight management counseling provided] : Weight management [Healthy eating counseling provided] : healthy eating [Activity counseling provided] : activity [Good understanding] : Patient has a good understanding of disease, goals and obesity follow-up plan [Target Wt Loss Goal ___] : Target weight loss goal [unfilled] lbs [Weight Self Once Weekly] : Weight self once weekly [Low Fat Diet] : Low fat diet [Low Salt Diet] : Low salt diet [Decrease Portions] : Decrease food portions [Keep Food Diary] : Keep food diary [___ min/wk activity recommended] : [unfilled] min/wk activity recommended [Walking] : Walking

## 2019-03-01 NOTE — PHYSICAL EXAM
[No Acute Distress] : no acute distress [Well Nourished] : well nourished [Well Developed] : well developed [Supple] : supple [No Respiratory Distress] : no respiratory distress  [Clear to Auscultation] : lungs were clear to auscultation bilaterally [Normal Rate] : normal rate  [Regular Rhythm] : with a regular rhythm [Normal S1, S2] : normal S1 and S2 [Soft] : abdomen soft [Non Tender] : non-tender [Non-distended] : non-distended [Normal Bowel Sounds] : normal bowel sounds [de-identified] : Tenderness on palpation ~1cm from Thoracic spine moves lateral for ~5cm [de-identified] : Spurling's maneuver inconclusive

## 2019-03-01 NOTE — HISTORY OF PRESENT ILLNESS
[de-identified] : 52 y/o female with a PMH of HTN, HLD, and iron-deficiency anemia presenting with complaints of R Thoracic back pain that has been present for 2 months but worsening of the past 2 weeks. States that She has also been having accompanying numbness and tingling in her R arm. States pain comes with spasms and is intermittent in nature. \par Denies fevers/chills, malaise, headaches, chest pain/palpitations, SOB, N/V/D, numbness/tingling/swelling of LE\par \par Pt states she is adherent to medication- takes chlorthalidone, Losartan, iron +vitamin C, and Escitalopram.

## 2019-03-03 ENCOUNTER — FORM ENCOUNTER (OUTPATIENT)
Age: 54
End: 2019-03-03

## 2019-03-04 PROCEDURE — G0463: CPT

## 2019-03-04 PROCEDURE — 72020 X-RAY EXAM OF SPINE 1 VIEW: CPT

## 2019-03-04 PROCEDURE — 82274 ASSAY TEST FOR BLOOD FECAL: CPT

## 2019-03-04 PROCEDURE — 72020 X-RAY EXAM OF SPINE 1 VIEW: CPT | Mod: 26

## 2019-03-05 DIAGNOSIS — M54.9 DORSALGIA, UNSPECIFIED: ICD-10-CM

## 2019-03-11 LAB — HEMOCCULT STL QL IA: NEGATIVE

## 2019-04-02 ENCOUNTER — APPOINTMENT (OUTPATIENT)
Dept: FAMILY MEDICINE | Facility: HOSPITAL | Age: 54
End: 2019-04-02

## 2019-04-04 ENCOUNTER — FORM ENCOUNTER (OUTPATIENT)
Age: 54
End: 2019-04-04

## 2019-04-04 DIAGNOSIS — R92.8 OTHER ABNORMAL AND INCONCLUSIVE FINDINGS ON DIAGNOSTIC IMAGING OF BREAST: ICD-10-CM

## 2019-04-05 ENCOUNTER — OUTPATIENT (OUTPATIENT)
Dept: OUTPATIENT SERVICES | Facility: HOSPITAL | Age: 54
LOS: 1 days | End: 2019-04-05
Payer: COMMERCIAL

## 2019-04-05 ENCOUNTER — APPOINTMENT (OUTPATIENT)
Dept: MAMMOGRAPHY | Facility: HOSPITAL | Age: 54
End: 2019-04-05
Payer: COMMERCIAL

## 2019-04-05 DIAGNOSIS — Z00.8 ENCOUNTER FOR OTHER GENERAL EXAMINATION: ICD-10-CM

## 2019-04-05 PROCEDURE — 77067 SCR MAMMO BI INCL CAD: CPT | Mod: 26

## 2019-04-05 PROCEDURE — 77063 BREAST TOMOSYNTHESIS BI: CPT | Mod: 26

## 2019-04-05 PROCEDURE — 77067 SCR MAMMO BI INCL CAD: CPT

## 2019-04-05 PROCEDURE — 77063 BREAST TOMOSYNTHESIS BI: CPT

## 2019-04-08 PROBLEM — R92.8 ABNORMAL MAMMOGRAM: Status: ACTIVE | Noted: 2019-04-08

## 2019-04-11 ENCOUNTER — FORM ENCOUNTER (OUTPATIENT)
Age: 54
End: 2019-04-11

## 2019-04-12 ENCOUNTER — APPOINTMENT (OUTPATIENT)
Dept: ULTRASOUND IMAGING | Facility: HOSPITAL | Age: 54
End: 2019-04-12
Payer: COMMERCIAL

## 2019-04-12 ENCOUNTER — OUTPATIENT (OUTPATIENT)
Dept: OUTPATIENT SERVICES | Facility: HOSPITAL | Age: 54
LOS: 1 days | End: 2019-04-12
Payer: COMMERCIAL

## 2019-04-12 ENCOUNTER — APPOINTMENT (OUTPATIENT)
Dept: MAMMOGRAPHY | Facility: HOSPITAL | Age: 54
End: 2019-04-12
Payer: COMMERCIAL

## 2019-04-12 DIAGNOSIS — Z00.8 ENCOUNTER FOR OTHER GENERAL EXAMINATION: ICD-10-CM

## 2019-04-12 PROCEDURE — 76641 ULTRASOUND BREAST COMPLETE: CPT | Mod: 26,50

## 2019-04-12 PROCEDURE — 76641 ULTRASOUND BREAST COMPLETE: CPT

## 2019-07-22 ENCOUNTER — APPOINTMENT (OUTPATIENT)
Dept: FAMILY MEDICINE | Facility: HOSPITAL | Age: 54
End: 2019-07-22

## 2019-08-05 ENCOUNTER — APPOINTMENT (OUTPATIENT)
Dept: FAMILY MEDICINE | Facility: HOSPITAL | Age: 54
End: 2019-08-05

## 2019-08-20 ENCOUNTER — OUTPATIENT (OUTPATIENT)
Dept: OUTPATIENT SERVICES | Facility: HOSPITAL | Age: 54
LOS: 1 days | End: 2019-08-20
Payer: COMMERCIAL

## 2019-08-20 ENCOUNTER — EMERGENCY (EMERGENCY)
Facility: HOSPITAL | Age: 54
LOS: 1 days | Discharge: HOME | End: 2019-08-20
Attending: INTERNAL MEDICINE | Admitting: INTERNAL MEDICINE
Payer: COMMERCIAL

## 2019-08-20 ENCOUNTER — APPOINTMENT (OUTPATIENT)
Dept: FAMILY MEDICINE | Facility: HOSPITAL | Age: 54
End: 2019-08-20

## 2019-08-20 VITALS
RESPIRATION RATE: 14 BRPM | HEART RATE: 68 BPM | DIASTOLIC BLOOD PRESSURE: 97 MMHG | TEMPERATURE: 97.8 F | WEIGHT: 166 LBS | OXYGEN SATURATION: 98 % | SYSTOLIC BLOOD PRESSURE: 209 MMHG | BODY MASS INDEX: 28.49 KG/M2

## 2019-08-20 VITALS
SYSTOLIC BLOOD PRESSURE: 176 MMHG | HEART RATE: 58 BPM | OXYGEN SATURATION: 100 % | DIASTOLIC BLOOD PRESSURE: 84 MMHG | RESPIRATION RATE: 16 BRPM

## 2019-08-20 VITALS
HEIGHT: 55 IN | TEMPERATURE: 98 F | WEIGHT: 165.35 LBS | RESPIRATION RATE: 18 BRPM | SYSTOLIC BLOOD PRESSURE: 153 MMHG | HEART RATE: 78 BPM | DIASTOLIC BLOOD PRESSURE: 104 MMHG | OXYGEN SATURATION: 99 %

## 2019-08-20 VITALS — SYSTOLIC BLOOD PRESSURE: 192 MMHG | DIASTOLIC BLOOD PRESSURE: 116 MMHG

## 2019-08-20 VITALS — DIASTOLIC BLOOD PRESSURE: 114 MMHG | SYSTOLIC BLOOD PRESSURE: 196 MMHG

## 2019-08-20 DIAGNOSIS — Z00.00 ENCOUNTER FOR GENERAL ADULT MEDICAL EXAMINATION WITHOUT ABNORMAL FINDINGS: ICD-10-CM

## 2019-08-20 LAB
ALBUMIN SERPL ELPH-MCNC: 3.4 G/DL — SIGNIFICANT CHANGE UP (ref 3.3–5)
ALP SERPL-CCNC: 134 U/L — HIGH (ref 40–120)
ALT FLD-CCNC: 18 U/L DA — SIGNIFICANT CHANGE UP (ref 10–45)
ANION GAP SERPL CALC-SCNC: 6 MMOL/L — SIGNIFICANT CHANGE UP (ref 5–17)
APTT BLD: 34.4 SEC — SIGNIFICANT CHANGE UP (ref 27.5–36.3)
AST SERPL-CCNC: 15 U/L — SIGNIFICANT CHANGE UP (ref 10–40)
BASOPHILS # BLD AUTO: 0.04 K/UL — SIGNIFICANT CHANGE UP (ref 0–0.2)
BASOPHILS NFR BLD AUTO: 0.4 % — SIGNIFICANT CHANGE UP (ref 0–2)
BILIRUB SERPL-MCNC: 0.4 MG/DL — SIGNIFICANT CHANGE UP (ref 0.2–1.2)
BUN SERPL-MCNC: 12 MG/DL — SIGNIFICANT CHANGE UP (ref 7–23)
CALCIUM SERPL-MCNC: 8.6 MG/DL — SIGNIFICANT CHANGE UP (ref 8.4–10.5)
CHLORIDE SERPL-SCNC: 103 MMOL/L — SIGNIFICANT CHANGE UP (ref 96–108)
CO2 SERPL-SCNC: 28 MMOL/L — SIGNIFICANT CHANGE UP (ref 22–31)
CREAT SERPL-MCNC: 0.63 MG/DL — SIGNIFICANT CHANGE UP (ref 0.5–1.3)
EOSINOPHIL # BLD AUTO: 0.26 K/UL — SIGNIFICANT CHANGE UP (ref 0–0.5)
EOSINOPHIL NFR BLD AUTO: 2.7 % — SIGNIFICANT CHANGE UP (ref 0–6)
GLUCOSE SERPL-MCNC: 100 MG/DL — HIGH (ref 70–99)
HCT VFR BLD CALC: 44.2 % — SIGNIFICANT CHANGE UP (ref 34.5–45)
HGB BLD-MCNC: 14.9 G/DL — SIGNIFICANT CHANGE UP (ref 11.5–15.5)
IMM GRANULOCYTES NFR BLD AUTO: 0.4 % — SIGNIFICANT CHANGE UP (ref 0–1.5)
INR BLD: 1.05 RATIO — SIGNIFICANT CHANGE UP (ref 0.88–1.16)
LYMPHOCYTES # BLD AUTO: 1.95 K/UL — SIGNIFICANT CHANGE UP (ref 1–3.3)
LYMPHOCYTES # BLD AUTO: 20.6 % — SIGNIFICANT CHANGE UP (ref 13–44)
MCHC RBC-ENTMCNC: 28.5 PG — SIGNIFICANT CHANGE UP (ref 27–34)
MCHC RBC-ENTMCNC: 33.7 GM/DL — SIGNIFICANT CHANGE UP (ref 32–36)
MCV RBC AUTO: 84.7 FL — SIGNIFICANT CHANGE UP (ref 80–100)
MONOCYTES # BLD AUTO: 0.81 K/UL — SIGNIFICANT CHANGE UP (ref 0–0.9)
MONOCYTES NFR BLD AUTO: 8.5 % — SIGNIFICANT CHANGE UP (ref 2–14)
NEUTROPHILS # BLD AUTO: 6.38 K/UL — SIGNIFICANT CHANGE UP (ref 1.8–7.4)
NEUTROPHILS NFR BLD AUTO: 67.4 % — SIGNIFICANT CHANGE UP (ref 43–77)
NRBC # BLD: 0 /100 WBCS — SIGNIFICANT CHANGE UP (ref 0–0)
NT-PROBNP SERPL-SCNC: 81 PG/ML — SIGNIFICANT CHANGE UP (ref 0–300)
PLATELET # BLD AUTO: 317 K/UL — SIGNIFICANT CHANGE UP (ref 150–400)
POTASSIUM SERPL-MCNC: 4 MMOL/L — SIGNIFICANT CHANGE UP (ref 3.5–5.3)
POTASSIUM SERPL-SCNC: 4 MMOL/L — SIGNIFICANT CHANGE UP (ref 3.5–5.3)
PROT SERPL-MCNC: 7.6 G/DL — SIGNIFICANT CHANGE UP (ref 6–8.3)
PROTHROM AB SERPL-ACNC: 11.8 SEC — SIGNIFICANT CHANGE UP (ref 10–12.9)
RBC # BLD: 5.22 M/UL — HIGH (ref 3.8–5.2)
RBC # FLD: 12.9 % — SIGNIFICANT CHANGE UP (ref 10.3–14.5)
SODIUM SERPL-SCNC: 137 MMOL/L — SIGNIFICANT CHANGE UP (ref 135–145)
TROPONIN I SERPL-MCNC: <.017 NG/ML — LOW (ref 0.02–0.06)
WBC # BLD: 9.48 K/UL — SIGNIFICANT CHANGE UP (ref 3.8–10.5)
WBC # FLD AUTO: 9.48 K/UL — SIGNIFICANT CHANGE UP (ref 3.8–10.5)

## 2019-08-20 PROCEDURE — 99284 EMERGENCY DEPT VISIT MOD MDM: CPT

## 2019-08-20 PROCEDURE — 83880 ASSAY OF NATRIURETIC PEPTIDE: CPT

## 2019-08-20 PROCEDURE — 71045 X-RAY EXAM CHEST 1 VIEW: CPT

## 2019-08-20 PROCEDURE — 70450 CT HEAD/BRAIN W/O DYE: CPT | Mod: 26

## 2019-08-20 PROCEDURE — 93005 ELECTROCARDIOGRAM TRACING: CPT

## 2019-08-20 PROCEDURE — 85730 THROMBOPLASTIN TIME PARTIAL: CPT

## 2019-08-20 PROCEDURE — 36415 COLL VENOUS BLD VENIPUNCTURE: CPT

## 2019-08-20 PROCEDURE — 70450 CT HEAD/BRAIN W/O DYE: CPT

## 2019-08-20 PROCEDURE — 80053 COMPREHEN METABOLIC PANEL: CPT

## 2019-08-20 PROCEDURE — 84484 ASSAY OF TROPONIN QUANT: CPT

## 2019-08-20 PROCEDURE — 85610 PROTHROMBIN TIME: CPT

## 2019-08-20 PROCEDURE — 96374 THER/PROPH/DIAG INJ IV PUSH: CPT

## 2019-08-20 PROCEDURE — G0463: CPT

## 2019-08-20 PROCEDURE — 71045 X-RAY EXAM CHEST 1 VIEW: CPT | Mod: 26

## 2019-08-20 PROCEDURE — 85027 COMPLETE CBC AUTOMATED: CPT

## 2019-08-20 PROCEDURE — 99284 EMERGENCY DEPT VISIT MOD MDM: CPT | Mod: 25

## 2019-08-20 PROCEDURE — 93010 ELECTROCARDIOGRAM REPORT: CPT

## 2019-08-20 RX ORDER — ONDANSETRON 8 MG/1
4 TABLET, FILM COATED ORAL ONCE
Refills: 0 | Status: COMPLETED | OUTPATIENT
Start: 2019-08-20 | End: 2019-08-20

## 2019-08-20 RX ORDER — SODIUM CHLORIDE 9 MG/ML
1000 INJECTION INTRAMUSCULAR; INTRAVENOUS; SUBCUTANEOUS ONCE
Refills: 0 | Status: COMPLETED | OUTPATIENT
Start: 2019-08-20 | End: 2019-08-20

## 2019-08-20 RX ORDER — MECLIZINE HCL 12.5 MG
25 TABLET ORAL ONCE
Refills: 0 | Status: COMPLETED | OUTPATIENT
Start: 2019-08-20 | End: 2019-08-20

## 2019-08-20 RX ORDER — MECLIZINE HCL 12.5 MG
1 TABLET ORAL
Qty: 21 | Refills: 0
Start: 2019-08-20 | End: 2019-08-26

## 2019-08-20 RX ADMIN — ONDANSETRON 4 MILLIGRAM(S): 8 TABLET, FILM COATED ORAL at 18:01

## 2019-08-20 RX ADMIN — Medication 25 MILLIGRAM(S): at 18:01

## 2019-08-20 RX ADMIN — SODIUM CHLORIDE 1000 MILLILITER(S): 9 INJECTION INTRAMUSCULAR; INTRAVENOUS; SUBCUTANEOUS at 18:00

## 2019-08-20 NOTE — ASSESSMENT
[FreeTextEntry1] : 54F w/ hypertensive emergency\par -dizziness, HA, seeing spots and nausea w/ blood pressure of 200/97, manual repeat 192/116\par -send to emergency room\par -Spoke w/ KISHA Espinoza and informed that the patient will be transferred over to the ED.\par \par discussed W/ Dr. Blackwood

## 2019-08-20 NOTE — ED PROVIDER NOTE - OBJECTIVE STATEMENT
54F sent from a FP for hypertensive emergency 156/119 manually, 200/97 BP cuff c/o dizziness, HA, seeing spots and nausea 54F sent from a  for hypertensive emergency 156/119 manually, 200/97 BP cuff c/o dizziness, HA, seeing spots and nausea. upon arrival in ED pt is feeling better. does have a hx vertigo that feels the same. room spinning sensation resolved when eyes are closed.   denies fever, chills, CP, SOB, vomiting, abd pain, edema, ear pain, ear ringing

## 2019-08-20 NOTE — END OF VISIT
[] : Resident [FreeTextEntry3] : since it was an emergency issue today her stool changes are not addressed. on next visit it needs to be addressed. pt referred to ER for HTN urgency

## 2019-08-20 NOTE — ED PROVIDER NOTE - NSFOLLOWUPINSTRUCTIONS_ED_ALL_ED_FT
FOLLOW UP WITH THE [facility] IN 24-48 HOURS    1. Meclizine 25mg every 8 hours as needed for dizziness  2. Continue medications as prescribed  3. Follow up with ENT in 1-2 days [phone] at [facility] 4th floor, [place], [id number]  4. Return to the ED for worsening dizziness, persistent vomiting, inability to walk or any concerns  ****    Vertigo    WHAT YOU NEED TO KNOW:    Vertigo is a condition that causes you to feel dizzy. You may feel that you or everything around you is moving or spinning. You may also feel like you are being pulled down or toward your side.     DISCHARGE INSTRUCTIONS:    Return to the emergency department if:     You have a headache and a stiff neck.      You have shaking chills and a fever.       You vomit over and over with no relief.       You have blood, pus, or fluid coming out of your ears.      You are confused.     Contact your healthcare provider if:     Your symptoms do not get better with treatment.       You have questions about your condition or care.    Medicines:     Medicine may be given to help relieve your symptoms.      Take your medicine as directed. Contact your healthcare provider if you think your medicine is not helping or if you have side effects. Tell him or her if you are allergic to any medicine. Keep a list of the medicines, vitamins, and herbs you take. Include the amounts, and when and why you take them. Bring the list or the pill bottles to follow-up visits. Carry your medicine list with you in case of an emergency.    Manage your symptoms:     Do not drive, walk without help, or operate heavy machinery when you are dizzy.       Move slowly when you move from one position to another position. Get up slowly from sitting or lying down. Sit or lie down right away if you feel dizzy.      Drink plenty of liquids. Liquids help prevent dehydration. Ask how much liquid to drink each day and which liquids are best for you.      Vestibular and balance rehabilitation therapy (VBRT) is used to teach you exercises to improve your balance and strength. These exercises may help decrease your vertigo and improve your balance. Ask for more information about this therapy.    Follow up with your healthcare provider as directed: Write down your questions so you remember to ask them during your visits.

## 2019-08-20 NOTE — PHYSICAL EXAM
[Well Nourished] : well nourished [No Acute Distress] : no acute distress [Normal Rate] : normal rate  [Well Developed] : well developed [Normal S1, S2] : normal S1 and S2 [Regular Rhythm] : with a regular rhythm [No Murmur] : no murmur heard [Supple] : supple [Normal] : no respiratory distress, lungs were clear to auscultation bilaterally and no accessory muscle use [de-identified] : Hx of heart murmur. Did not appreciate today

## 2019-08-20 NOTE — HISTORY OF PRESENT ILLNESS
[FreeTextEntry8] : 54F w/ hx of htn on losartan and chlorthalidone who states that she took her medication this morning but is not sure of the name. states it is the blue pill. Pt coming in with complaint of dizziness x1 week. Of note she does have a history of vertigo and states that it is similar to her vertigo symptoms. Pt also complaining of headache x1 day with associated nausea and states that she is also seeing spots. PT denies chest pain, palpitations, or SOB. Pt is under stress as she was informed yesterday that her granddaughter and daughter has to leave the country within 30 days. \par

## 2019-08-20 NOTE — ED PROVIDER NOTE - PHYSICAL EXAMINATION
General:     NAD, well-nourished, well-appearing  Eyes: PERRL, +horizontal nystagmus  Head:     NC/AT, EOMI, oral mucosa moist, no temporal TTP  Neck:     trachea midline  Lungs:     CTA b/l  CVS:     RRR  Abd:     +BS, s/nt/nd  Ext:   no deformities   Neuro: AAOx3, no sensory/motor deficits

## 2019-08-20 NOTE — ED ADULT TRIAGE NOTE - CHIEF COMPLAINT QUOTE
Pt from [facility] for evaluation of dizziness x 1 week and headache x 1 day associated with nausea. pt crying, reports "I'm under a lot of stress, my granddaughter has to leave the country within 30 days". Pt denies numbness/weakness/fevers/chills. Pt states she took Amlodipine at 7 AM.

## 2019-08-20 NOTE — ED PROVIDER NOTE - ATTENDING CONTRIBUTION TO CARE
54F sent from a  for hypertensive emergency 156/119 manually, 200/97 BP cuff c/o dizziness, HA, seeing spots and nausea. upon arrival in ED pt is feeling better. does have a hx vertigo that feels the same. room spinning sensation resolved when eyes are closed.   denies fever, chills, CP, SOB, vomiting, abd pain, edema  symptoms resolved with meclizine. able to ambulate without any abnormality. BP also decreased with no medications  Dr. Delacruz:  I have reviewed and discussed with the PA/ resident the case specifics, including the history, physical assessment, evaluation, conclusion, laboratory results, and medical plan. I agree with the contents, and conclusions. I have personally examined, and interviewed the patient.

## 2019-08-20 NOTE — ED PROVIDER NOTE - CLINICAL SUMMARY MEDICAL DECISION MAKING FREE TEXT BOX
54F sent from a  for hypertensive emergency 156/119 manually, 200/97 BP cuff c/o dizziness, HA, seeing spots and nausea. upon arrival in ED pt is feeling better. does have a hx vertigo that feels the same. room spinning sensation resolved when eyes are closed.   denies fever, chills, CP, SOB, vomiting, abd pain, edema  symptoms resolved with meclizine. able to ambulate without any abnormality. BP also decreased with no medications

## 2019-08-20 NOTE — REVIEW OF SYSTEMS
[Negative] : Musculoskeletal [Shortness Of Breath] : no shortness of breath [Chest Pain] : no chest pain [Palpitations] : no palpitations [FreeTextEntry3] : seeing spots [FreeTextEntry7] : decrease caliber of BM. Improves with orange juice

## 2019-08-21 DIAGNOSIS — I16.1 HYPERTENSIVE EMERGENCY: ICD-10-CM

## 2019-08-30 ENCOUNTER — OUTPATIENT (OUTPATIENT)
Dept: OUTPATIENT SERVICES | Facility: HOSPITAL | Age: 54
LOS: 1 days | End: 2019-08-30
Payer: COMMERCIAL

## 2019-08-30 ENCOUNTER — APPOINTMENT (OUTPATIENT)
Dept: FAMILY MEDICINE | Facility: HOSPITAL | Age: 54
End: 2019-08-30

## 2019-08-30 VITALS
OXYGEN SATURATION: 98 % | BODY MASS INDEX: 28.34 KG/M2 | HEART RATE: 71 BPM | WEIGHT: 166 LBS | DIASTOLIC BLOOD PRESSURE: 90 MMHG | HEIGHT: 64 IN | TEMPERATURE: 98 F | SYSTOLIC BLOOD PRESSURE: 168 MMHG

## 2019-08-30 DIAGNOSIS — Z00.00 ENCOUNTER FOR GENERAL ADULT MEDICAL EXAMINATION WITHOUT ABNORMAL FINDINGS: ICD-10-CM

## 2019-08-30 PROCEDURE — G0463: CPT

## 2019-08-30 PROCEDURE — 36415 COLL VENOUS BLD VENIPUNCTURE: CPT

## 2019-08-30 RX ORDER — IBUPROFEN 600 MG/1
600 TABLET, FILM COATED ORAL 3 TIMES DAILY
Qty: 21 | Refills: 0 | Status: COMPLETED | COMMUNITY
Start: 2019-03-01 | End: 2019-08-30

## 2019-08-30 RX ORDER — CYCLOBENZAPRINE HYDROCHLORIDE 5 MG/1
5 TABLET, FILM COATED ORAL TWICE DAILY
Qty: 28 | Refills: 0 | Status: COMPLETED | COMMUNITY
Start: 2019-03-01 | End: 2019-08-30

## 2019-08-30 NOTE — REVIEW OF SYSTEMS
[Dizziness] : dizziness [Chills] : chills [Negative] : Integumentary [Fever] : no fever [Chest Pain] : no chest pain [Palpitations] : no palpitations [Shortness Of Breath] : no shortness of breath [Cough] : no cough [Abdominal Pain] : no abdominal pain [Nausea] : no nausea [Vomiting] : no vomiting [Headache] : no headache [FreeTextEntry8] : vaginal bleeding

## 2019-08-30 NOTE — PHYSICAL EXAM
[No Acute Distress] : no acute distress [Well Nourished] : well nourished [Well Developed] : well developed [Well-Appearing] : well-appearing [Normal Sclera/Conjunctiva] : normal sclera/conjunctiva [PERRL] : pupils equal round and reactive to light [EOMI] : extraocular movements intact [Normal Oropharynx] : the oropharynx was normal [Normal Outer Ear/Nose] : the outer ears and nose were normal in appearance [No JVD] : no jugular venous distention [No Lymphadenopathy] : no lymphadenopathy [Supple] : supple [No Respiratory Distress] : no respiratory distress  [Clear to Auscultation] : lungs were clear to auscultation bilaterally [No Accessory Muscle Use] : no accessory muscle use [Normal Rate] : normal rate  [Regular Rhythm] : with a regular rhythm [Normal S1, S2] : normal S1 and S2 [Soft] : abdomen soft [Non Tender] : non-tender [Non-distended] : non-distended [No HSM] : no HSM [Normal Bowel Sounds] : normal bowel sounds [Uterus] : uterus was normal size, without masses or tenderness [External Female Genitalia] : normal external genitalia [Uterine Adnexae] : normal adnexa [No Spinal Tenderness] : no spinal tenderness [No Joint Swelling] : no joint swelling [No Rash] : no rash [Coordination Grossly Intact] : coordination grossly intact [No Focal Deficits] : no focal deficits [Normal Gait] : normal gait [Deep Tendon Reflexes (DTR)] : deep tendon reflexes were 2+ and symmetric [Normal Affect] : the affect was normal [Alert and Oriented x3] : oriented to person, place, and time [Normal Insight/Judgement] : insight and judgment were intact [de-identified] : no conjunctival pallor [de-identified] : chaperoned by Nurse Leung

## 2019-08-30 NOTE — HISTORY OF PRESENT ILLNESS
[de-identified] : 53 yo postmenopausal   F with Hx of fibroids here for BP check notes that she has had severe vaginal bleeding for the past 4 days and has been using 4 pads daily. Pt notes that she has been feeling dizzy. She was prescribed meclizine in ED last week. She notes that dizziness occurred prior to vaginal bleeding. denies any Headache. Denies any uterine cramps. Denies nausea or vomiting. LMP was 2018 had mild vaginal spotting in May 2019. Pt was given meclizine in the ED last week with mild relief.

## 2019-08-30 NOTE — ASSESSMENT
[FreeTextEntry1] : 53 yo  postmenopausal F w/ Hx of fibroids as described above with 4 days of heavy vaginal bleeding. Pt also here for BP check.\par \par #HTN\par - BP today 168/90, improved from BP of 192/116 10 days ago\par - On chlorthalidone 50 mg\par - Noted that she did not receive refill of losartan 100 mg\par - MEDS: Losartan 100 mg renewed today\par - RTC in 1 week for BP check\par \par #Postmenopausal vaginal bleeding\par #Hx of fibroids\par - LABS: CBC\par - RADS: TVUS and Transabdominal Pelvic US\par - Maintain menstrual diary\par - Pt advised to return to ED immediately if she develops severe dizziness, CP, palpitations or SOB\par - RTC in 1 week\par \par Discussed with Dr. Anguiano and Dr. Irving

## 2019-09-03 ENCOUNTER — FORM ENCOUNTER (OUTPATIENT)
Age: 54
End: 2019-09-03

## 2019-09-04 ENCOUNTER — OUTPATIENT (OUTPATIENT)
Dept: OUTPATIENT SERVICES | Facility: HOSPITAL | Age: 54
LOS: 1 days | End: 2019-09-04
Payer: COMMERCIAL

## 2019-09-04 DIAGNOSIS — N95.0 POSTMENOPAUSAL BLEEDING: ICD-10-CM

## 2019-09-04 PROCEDURE — 76830 TRANSVAGINAL US NON-OB: CPT | Mod: 26

## 2019-09-04 PROCEDURE — 76856 US EXAM PELVIC COMPLETE: CPT | Mod: 26

## 2019-09-04 PROCEDURE — 76830 TRANSVAGINAL US NON-OB: CPT

## 2019-09-04 PROCEDURE — 76856 US EXAM PELVIC COMPLETE: CPT

## 2019-09-06 ENCOUNTER — OUTPATIENT (OUTPATIENT)
Dept: OUTPATIENT SERVICES | Facility: HOSPITAL | Age: 54
LOS: 1 days | End: 2019-09-06
Payer: COMMERCIAL

## 2019-09-06 ENCOUNTER — APPOINTMENT (OUTPATIENT)
Dept: FAMILY MEDICINE | Facility: HOSPITAL | Age: 54
End: 2019-09-06

## 2019-09-06 VITALS — DIASTOLIC BLOOD PRESSURE: 92 MMHG | SYSTOLIC BLOOD PRESSURE: 164 MMHG

## 2019-09-06 VITALS
BODY MASS INDEX: 28.49 KG/M2 | SYSTOLIC BLOOD PRESSURE: 182 MMHG | WEIGHT: 166 LBS | TEMPERATURE: 97.3 F | DIASTOLIC BLOOD PRESSURE: 101 MMHG | OXYGEN SATURATION: 100 % | RESPIRATION RATE: 14 BRPM | HEART RATE: 61 BPM

## 2019-09-06 VITALS — SYSTOLIC BLOOD PRESSURE: 180 MMHG | DIASTOLIC BLOOD PRESSURE: 84 MMHG

## 2019-09-06 DIAGNOSIS — I10 ESSENTIAL (PRIMARY) HYPERTENSION: ICD-10-CM

## 2019-09-06 DIAGNOSIS — Z00.00 ENCOUNTER FOR GENERAL ADULT MEDICAL EXAMINATION WITHOUT ABNORMAL FINDINGS: ICD-10-CM

## 2019-09-06 DIAGNOSIS — H81.10 BENIGN PAROXYSMAL VERTIGO, UNSPECIFIED EAR: ICD-10-CM

## 2019-09-06 DIAGNOSIS — F32.9 MAJOR DEPRESSIVE DISORDER, SINGLE EPISODE, UNSPECIFIED: ICD-10-CM

## 2019-09-06 DIAGNOSIS — N95.0 POSTMENOPAUSAL BLEEDING: ICD-10-CM

## 2019-09-06 PROCEDURE — G0463: CPT

## 2019-09-06 RX ORDER — UBIDECARENONE 200 MG
500 CAPSULE ORAL DAILY
Qty: 60 | Refills: 1 | Status: DISCONTINUED | COMMUNITY
Start: 2018-06-18 | End: 2019-09-06

## 2019-09-06 NOTE — REVIEW OF SYSTEMS
[Dizziness] : dizziness [Fainting] : no fainting [Unsteady Walking] : no ataxia [Insomnia] : insomnia [Depression] : depression [Negative] : Heme/Lymph [de-identified] : +mild SI, but good protective factors

## 2019-09-06 NOTE — HISTORY OF PRESENT ILLNESS
[FreeTextEntry8] : 54F w/ hx of fibroids and vertigo presenting for difficulty sleeping due to BPPV. Pt has had BPPV x 3 years. Pt has been taking meclizine TID x 2 weeks with relief. Mild nausea which has resolved. No fever, vomiting. Eating and drinking well. \par \par Vaginal bleeding has resolved. But pt reports she still has pain with sex occasionally. \par \par Pt reports she occasionally thinks of driving her car off the edge, but she reports she has three children that keeps her here. Refusing social work at this time. Pt reports she has had therapy in the past and everytime it seems to have made it worst. Pt reports she virgilio with prayer with God. She is amenable to trying an antidepressant.

## 2019-09-06 NOTE — PLAN
[FreeTextEntry1] : #BPPV\par - continue meclizine\par - discussed avoiding sharp movements\par - discussed sleeping in a position that doesn't aggravate BPPV\par \par #HTN \par - uncontrolled and asymptomatic\par - continue losartan \par - chlorthalidone added to regimen \par - RTC in 1 week for f/u HTN \par \par #Depression \par - mild SI, but good protective factors, not currently a risk to self requiring hospitalization\par - offered SW, but pt refused \par - sertraline sent\par - f/u at next visit \par \par #Postmenopausal bleeding\par - currently resolved\par - US: fibroids\par - task sent to Patricia for EMB scheduling \par \par #HCM \par - due for CPE\par - screenings up to date\par - refused flu vaccine today\par - other vaccinations up to date \par \par RTC in 1 week for HTN and depression f/u \par D/w Dr. Irving and Dr. Anguiano

## 2019-09-06 NOTE — PHYSICAL EXAM
[No Acute Distress] : no acute distress [Well Nourished] : well nourished [Well Developed] : well developed [Well-Appearing] : well-appearing [Normal Sclera/Conjunctiva] : normal sclera/conjunctiva [PERRL] : pupils equal round and reactive to light [EOMI] : extraocular movements intact [No Respiratory Distress] : no respiratory distress  [No Accessory Muscle Use] : no accessory muscle use [Clear to Auscultation] : lungs were clear to auscultation bilaterally [Normal Rate] : normal rate  [Regular Rhythm] : with a regular rhythm [Normal S1, S2] : normal S1 and S2 [No Murmur] : no murmur heard [Non Tender] : non-tender [Soft] : abdomen soft [Non-distended] : non-distended [Coordination Grossly Intact] : coordination grossly intact [Normal Gait] : normal gait [No Focal Deficits] : no focal deficits [de-identified] : 5/5 strength in upper and lower extremities bilaterally  [de-identified] : sensation intact b/l  [de-identified] : +mild SI, good protective factors

## 2019-09-10 DIAGNOSIS — H81.10 BENIGN PAROXYSMAL VERTIGO, UNSPECIFIED EAR: ICD-10-CM

## 2019-09-10 DIAGNOSIS — G47.00 INSOMNIA, UNSPECIFIED: ICD-10-CM

## 2019-09-10 DIAGNOSIS — F32.9 MAJOR DEPRESSIVE DISORDER, SINGLE EPISODE, UNSPECIFIED: ICD-10-CM

## 2019-09-10 DIAGNOSIS — N95.0 POSTMENOPAUSAL BLEEDING: ICD-10-CM

## 2019-09-10 DIAGNOSIS — I10 ESSENTIAL (PRIMARY) HYPERTENSION: ICD-10-CM

## 2019-09-10 LAB
BASOPHILS # BLD AUTO: 0.05 K/UL
BASOPHILS NFR BLD AUTO: 0.7 %
EOSINOPHIL # BLD AUTO: 0.24 K/UL
EOSINOPHIL NFR BLD AUTO: 3.1 %
HCT VFR BLD CALC: 42.4 %
HGB BLD-MCNC: 13.4 G/DL
IMM GRANULOCYTES NFR BLD AUTO: 0.1 %
LYMPHOCYTES # BLD AUTO: 1.85 K/UL
LYMPHOCYTES NFR BLD AUTO: 24.2 %
MAN DIFF?: NORMAL
MCHC RBC-ENTMCNC: 28.4 PG
MCHC RBC-ENTMCNC: 31.6 GM/DL
MCV RBC AUTO: 89.8 FL
MONOCYTES # BLD AUTO: 0.73 K/UL
MONOCYTES NFR BLD AUTO: 9.6 %
NEUTROPHILS # BLD AUTO: 4.76 K/UL
NEUTROPHILS NFR BLD AUTO: 62.3 %
PLATELET # BLD AUTO: 358 K/UL
RBC # BLD: 4.72 M/UL
RBC # FLD: 13.8 %
WBC # FLD AUTO: 7.64 K/UL

## 2019-09-13 ENCOUNTER — APPOINTMENT (OUTPATIENT)
Dept: FAMILY MEDICINE | Facility: HOSPITAL | Age: 54
End: 2019-09-13

## 2019-12-02 ENCOUNTER — APPOINTMENT (OUTPATIENT)
Dept: FAMILY MEDICINE | Facility: HOSPITAL | Age: 54
End: 2019-12-02

## 2020-01-14 ENCOUNTER — OUTPATIENT (OUTPATIENT)
Dept: OUTPATIENT SERVICES | Facility: HOSPITAL | Age: 55
LOS: 1 days | End: 2020-01-14
Payer: COMMERCIAL

## 2020-01-14 ENCOUNTER — APPOINTMENT (OUTPATIENT)
Dept: FAMILY MEDICINE | Facility: HOSPITAL | Age: 55
End: 2020-01-14

## 2020-01-14 VITALS
WEIGHT: 162 LBS | TEMPERATURE: 98.4 F | RESPIRATION RATE: 17 BRPM | SYSTOLIC BLOOD PRESSURE: 170 MMHG | OXYGEN SATURATION: 97 % | DIASTOLIC BLOOD PRESSURE: 106 MMHG | BODY MASS INDEX: 27.81 KG/M2 | HEART RATE: 85 BPM

## 2020-01-14 VITALS — SYSTOLIC BLOOD PRESSURE: 161 MMHG | DIASTOLIC BLOOD PRESSURE: 99 MMHG

## 2020-01-14 DIAGNOSIS — R60.0 LOCALIZED EDEMA: ICD-10-CM

## 2020-01-14 DIAGNOSIS — Z87.898 PERSONAL HISTORY OF OTHER SPECIFIED CONDITIONS: ICD-10-CM

## 2020-01-14 DIAGNOSIS — M94.0 CHONDROCOSTAL JUNCTION SYNDROME [TIETZE]: ICD-10-CM

## 2020-01-14 DIAGNOSIS — Z00.00 ENCOUNTER FOR GENERAL ADULT MEDICAL EXAMINATION WITHOUT ABNORMAL FINDINGS: ICD-10-CM

## 2020-01-14 DIAGNOSIS — Z12.72 ENCOUNTER FOR SCREENING FOR MALIGNANT NEOPLASM OF VAGINA: ICD-10-CM

## 2020-01-14 DIAGNOSIS — Z87.42 PERSONAL HISTORY OF OTHER DISEASES OF THE FEMALE GENITAL TRACT: ICD-10-CM

## 2020-01-14 DIAGNOSIS — Z87.39 PERSONAL HISTORY OF OTHER DISEASES OF THE MUSCULOSKELETAL SYSTEM AND CONNECTIVE TISSUE: ICD-10-CM

## 2020-01-14 DIAGNOSIS — D64.9 ANEMIA, UNSPECIFIED: ICD-10-CM

## 2020-01-14 DIAGNOSIS — Z23 ENCOUNTER FOR IMMUNIZATION: ICD-10-CM

## 2020-01-14 PROCEDURE — G0463: CPT

## 2020-01-14 RX ORDER — MECLIZINE HYDROCHLORIDE 25 MG/1
25 TABLET ORAL 3 TIMES DAILY
Qty: 42 | Refills: 0 | Status: DISCONTINUED | COMMUNITY
Start: 2019-09-06 | End: 2020-01-14

## 2020-01-14 RX ORDER — ASCORBIC ACID 500 MG
500 TABLET ORAL DAILY
Qty: 60 | Refills: 1 | Status: DISCONTINUED | COMMUNITY
Start: 2017-06-19 | End: 2020-01-14

## 2020-01-14 RX ORDER — LABETALOL HYDROCHLORIDE 200 MG/1
200 TABLET, FILM COATED ORAL
Qty: 0 | Refills: 0 | Status: COMPLETED | OUTPATIENT
Start: 2020-01-14

## 2020-01-14 RX ADMIN — LABETALOL HYDROCHLORIDE 1 MG: 200 TABLET, FILM COATED ORAL at 00:00

## 2020-01-14 NOTE — REVIEW OF SYSTEMS
[Nausea] : nausea [Headache] : headache [Chest Pain] : no chest pain [Chills] : no chills [Fever] : no fever [Abdominal Pain] : no abdominal pain [Shortness Of Breath] : no shortness of breath [Palpitations] : no palpitations [Dizziness] : no dizziness [Fainting] : no fainting [Vomiting] : no vomiting

## 2020-01-14 NOTE — PHYSICAL EXAM
[No Acute Distress] : no acute distress [Normal Sclera/Conjunctiva] : normal sclera/conjunctiva [Well Developed] : well developed [PERRL] : pupils equal round and reactive to light [EOMI] : extraocular movements intact [No Respiratory Distress] : no respiratory distress  [No Accessory Muscle Use] : no accessory muscle use [Normal Rate] : normal rate  [Clear to Auscultation] : lungs were clear to auscultation bilaterally [Normal S1, S2] : normal S1 and S2 [Regular Rhythm] : with a regular rhythm [Soft] : abdomen soft [Non Tender] : non-tender [Non-distended] : non-distended

## 2020-01-14 NOTE — HISTORY OF PRESENT ILLNESS
[FreeTextEntry8] : 53 yo F BPPV, costochondritis, anemia, depression, HLD, HTN, insomnia presents for medication renewal. Patient reports headache since yesterday, b/l temple region and associated nausea, slight dizziness, and eyes feeling tired. Pt denies photophobia and phonophobia, and reports headache is currently 7/10, shooting pain. Pt reports she took 2 tylenol last night for pain which helped pain improved.

## 2020-01-14 NOTE — PLAN
[FreeTextEntry1] : \par \par \par #Hypertensive emergency\par -170/106- repeat manual /100\par -Labetalol 200 mg x1 given\par -Repeat BP improved to 161/99 and patient symptoms resolved after 1 hour, patient now feeling much better\par -Chlorthalidone 50 mg QD refilled\par -Losartan 100 mg QD refilled\par -Recommended patient follow up closely for BP monitoring\par -Adverse effects of HTN discussed with patient - including risk of CVA , CAD and kidney disease \par \par #Depression\par -Pt denied SI/HI\par -Sertraline refilled\par \par Case was d/w Dr. Anaya\par \par Follow up in 3-5 days for repeat BP \par \par Advised patient to go to the ED if having chest pain, persistent headache, in acute distress, feeling short of breath, or in acute distress

## 2020-01-15 DIAGNOSIS — R60.0 LOCALIZED EDEMA: ICD-10-CM

## 2020-01-15 DIAGNOSIS — Z12.72 ENCOUNTER FOR SCREENING FOR MALIGNANT NEOPLASM OF VAGINA: ICD-10-CM

## 2020-01-15 DIAGNOSIS — Z87.42 PERSONAL HISTORY OF OTHER DISEASES OF THE FEMALE GENITAL TRACT: ICD-10-CM

## 2020-01-15 DIAGNOSIS — D64.9 ANEMIA, UNSPECIFIED: ICD-10-CM

## 2020-01-15 DIAGNOSIS — M94.0 CHONDROCOSTAL JUNCTION SYNDROME [TIETZE]: ICD-10-CM

## 2020-01-21 ENCOUNTER — APPOINTMENT (OUTPATIENT)
Dept: FAMILY MEDICINE | Facility: HOSPITAL | Age: 55
End: 2020-01-21

## 2020-02-13 ENCOUNTER — APPOINTMENT (OUTPATIENT)
Dept: FAMILY MEDICINE | Facility: HOSPITAL | Age: 55
End: 2020-02-13

## 2020-02-13 ENCOUNTER — OUTPATIENT (OUTPATIENT)
Dept: OUTPATIENT SERVICES | Facility: HOSPITAL | Age: 55
LOS: 1 days | End: 2020-02-13
Payer: COMMERCIAL

## 2020-02-13 VITALS — DIASTOLIC BLOOD PRESSURE: 100 MMHG | SYSTOLIC BLOOD PRESSURE: 150 MMHG

## 2020-02-13 VITALS
OXYGEN SATURATION: 99 % | WEIGHT: 164 LBS | DIASTOLIC BLOOD PRESSURE: 99 MMHG | TEMPERATURE: 97.7 F | HEART RATE: 59 BPM | SYSTOLIC BLOOD PRESSURE: 179 MMHG | BODY MASS INDEX: 28.15 KG/M2 | RESPIRATION RATE: 17 BRPM

## 2020-02-13 DIAGNOSIS — Z00.00 ENCOUNTER FOR GENERAL ADULT MEDICAL EXAMINATION WITHOUT ABNORMAL FINDINGS: ICD-10-CM

## 2020-02-13 PROCEDURE — G0463: CPT

## 2020-02-13 RX ORDER — SERTRALINE 25 MG/1
25 TABLET, FILM COATED ORAL DAILY
Qty: 30 | Refills: 2 | Status: DISCONTINUED | COMMUNITY
Start: 2019-09-06 | End: 2020-02-13

## 2020-02-13 NOTE — PHYSICAL EXAM
[Normal] : soft, non-tender, non-distended, no masses palpated, no HSM and normal bowel sounds [No Spinal Tenderness] : no spinal tenderness [Grossly Normal Strength/Tone] : grossly normal strength/tone [Normal Affect] : the affect was normal [Normal Insight/Judgement] : insight and judgment were intact

## 2020-02-16 ENCOUNTER — EMERGENCY (EMERGENCY)
Facility: HOSPITAL | Age: 55
LOS: 1 days | Discharge: HOME | End: 2020-02-16
Attending: EMERGENCY MEDICINE | Admitting: EMERGENCY MEDICINE
Payer: COMMERCIAL

## 2020-02-16 VITALS
DIASTOLIC BLOOD PRESSURE: 108 MMHG | TEMPERATURE: 98 F | RESPIRATION RATE: 17 BRPM | WEIGHT: 160.94 LBS | SYSTOLIC BLOOD PRESSURE: 185 MMHG | OXYGEN SATURATION: 100 % | HEIGHT: 64 IN | HEART RATE: 69 BPM

## 2020-02-16 VITALS
HEART RATE: 65 BPM | SYSTOLIC BLOOD PRESSURE: 157 MMHG | RESPIRATION RATE: 16 BRPM | OXYGEN SATURATION: 99 % | DIASTOLIC BLOOD PRESSURE: 82 MMHG

## 2020-02-16 DIAGNOSIS — Z90.49 ACQUIRED ABSENCE OF OTHER SPECIFIED PARTS OF DIGESTIVE TRACT: Chronic | ICD-10-CM

## 2020-02-16 DIAGNOSIS — Z98.891 HISTORY OF UTERINE SCAR FROM PREVIOUS SURGERY: Chronic | ICD-10-CM

## 2020-02-16 LAB
ALBUMIN SERPL ELPH-MCNC: 3.7 G/DL — SIGNIFICANT CHANGE UP (ref 3.3–5)
ALP SERPL-CCNC: 116 U/L — SIGNIFICANT CHANGE UP (ref 40–120)
ALT FLD-CCNC: 29 U/L — SIGNIFICANT CHANGE UP (ref 10–45)
ANION GAP SERPL CALC-SCNC: 7 MMOL/L — SIGNIFICANT CHANGE UP (ref 5–17)
AST SERPL-CCNC: 17 U/L — SIGNIFICANT CHANGE UP (ref 10–40)
BASOPHILS # BLD AUTO: 0.05 K/UL — SIGNIFICANT CHANGE UP (ref 0–0.2)
BASOPHILS NFR BLD AUTO: 0.7 % — SIGNIFICANT CHANGE UP (ref 0–2)
BILIRUB SERPL-MCNC: 0.4 MG/DL — SIGNIFICANT CHANGE UP (ref 0.2–1.2)
BUN SERPL-MCNC: 19 MG/DL — SIGNIFICANT CHANGE UP (ref 7–23)
CALCIUM SERPL-MCNC: 8.7 MG/DL — SIGNIFICANT CHANGE UP (ref 8.4–10.5)
CHLORIDE SERPL-SCNC: 102 MMOL/L — SIGNIFICANT CHANGE UP (ref 96–108)
CO2 SERPL-SCNC: 32 MMOL/L — HIGH (ref 22–31)
CREAT SERPL-MCNC: 0.65 MG/DL — SIGNIFICANT CHANGE UP (ref 0.5–1.3)
EOSINOPHIL # BLD AUTO: 0.34 K/UL — SIGNIFICANT CHANGE UP (ref 0–0.5)
EOSINOPHIL NFR BLD AUTO: 4.5 % — SIGNIFICANT CHANGE UP (ref 0–6)
GLUCOSE SERPL-MCNC: 123 MG/DL — HIGH (ref 70–99)
HCT VFR BLD CALC: 41.8 % — SIGNIFICANT CHANGE UP (ref 34.5–45)
HGB BLD-MCNC: 14.2 G/DL — SIGNIFICANT CHANGE UP (ref 11.5–15.5)
IMM GRANULOCYTES NFR BLD AUTO: 0.3 % — SIGNIFICANT CHANGE UP (ref 0–1.5)
LYMPHOCYTES # BLD AUTO: 2.18 K/UL — SIGNIFICANT CHANGE UP (ref 1–3.3)
LYMPHOCYTES # BLD AUTO: 29 % — SIGNIFICANT CHANGE UP (ref 13–44)
MCHC RBC-ENTMCNC: 28 PG — SIGNIFICANT CHANGE UP (ref 27–34)
MCHC RBC-ENTMCNC: 34 GM/DL — SIGNIFICANT CHANGE UP (ref 32–36)
MCV RBC AUTO: 82.4 FL — SIGNIFICANT CHANGE UP (ref 80–100)
MONOCYTES # BLD AUTO: 0.67 K/UL — SIGNIFICANT CHANGE UP (ref 0–0.9)
MONOCYTES NFR BLD AUTO: 8.9 % — SIGNIFICANT CHANGE UP (ref 2–14)
NEUTROPHILS # BLD AUTO: 4.27 K/UL — SIGNIFICANT CHANGE UP (ref 1.8–7.4)
NEUTROPHILS NFR BLD AUTO: 56.6 % — SIGNIFICANT CHANGE UP (ref 43–77)
NRBC # BLD: 0 /100 WBCS — SIGNIFICANT CHANGE UP (ref 0–0)
PLATELET # BLD AUTO: 323 K/UL — SIGNIFICANT CHANGE UP (ref 150–400)
POTASSIUM SERPL-MCNC: 3.5 MMOL/L — SIGNIFICANT CHANGE UP (ref 3.5–5.3)
POTASSIUM SERPL-SCNC: 3.5 MMOL/L — SIGNIFICANT CHANGE UP (ref 3.5–5.3)
PROT SERPL-MCNC: 8.1 G/DL — SIGNIFICANT CHANGE UP (ref 6–8.3)
RBC # BLD: 5.07 M/UL — SIGNIFICANT CHANGE UP (ref 3.8–5.2)
RBC # FLD: 13.9 % — SIGNIFICANT CHANGE UP (ref 10.3–14.5)
SODIUM SERPL-SCNC: 141 MMOL/L — SIGNIFICANT CHANGE UP (ref 135–145)
TROPONIN I SERPL-MCNC: <.017 NG/ML — LOW (ref 0.02–0.06)
WBC # BLD: 7.53 K/UL — SIGNIFICANT CHANGE UP (ref 3.8–10.5)
WBC # FLD AUTO: 7.53 K/UL — SIGNIFICANT CHANGE UP (ref 3.8–10.5)

## 2020-02-16 PROCEDURE — 99285 EMERGENCY DEPT VISIT HI MDM: CPT

## 2020-02-16 PROCEDURE — 70450 CT HEAD/BRAIN W/O DYE: CPT

## 2020-02-16 PROCEDURE — 70450 CT HEAD/BRAIN W/O DYE: CPT | Mod: 26

## 2020-02-16 PROCEDURE — 85027 COMPLETE CBC AUTOMATED: CPT

## 2020-02-16 PROCEDURE — 71045 X-RAY EXAM CHEST 1 VIEW: CPT

## 2020-02-16 PROCEDURE — 84484 ASSAY OF TROPONIN QUANT: CPT

## 2020-02-16 PROCEDURE — 80053 COMPREHEN METABOLIC PANEL: CPT

## 2020-02-16 PROCEDURE — 36415 COLL VENOUS BLD VENIPUNCTURE: CPT

## 2020-02-16 PROCEDURE — 99284 EMERGENCY DEPT VISIT MOD MDM: CPT | Mod: 25

## 2020-02-16 PROCEDURE — 96374 THER/PROPH/DIAG INJ IV PUSH: CPT

## 2020-02-16 PROCEDURE — 71045 X-RAY EXAM CHEST 1 VIEW: CPT | Mod: 26

## 2020-02-16 PROCEDURE — 93010 ELECTROCARDIOGRAM REPORT: CPT

## 2020-02-16 PROCEDURE — 93005 ELECTROCARDIOGRAM TRACING: CPT

## 2020-02-16 RX ORDER — METOCLOPRAMIDE HCL 10 MG
10 TABLET ORAL ONCE
Refills: 0 | Status: COMPLETED | OUTPATIENT
Start: 2020-02-16 | End: 2020-02-16

## 2020-02-16 RX ORDER — SODIUM CHLORIDE 9 MG/ML
1000 INJECTION INTRAMUSCULAR; INTRAVENOUS; SUBCUTANEOUS ONCE
Refills: 0 | Status: COMPLETED | OUTPATIENT
Start: 2020-02-16 | End: 2020-02-16

## 2020-02-16 RX ADMIN — Medication 10 MILLIGRAM(S): at 20:42

## 2020-02-16 RX ADMIN — SODIUM CHLORIDE 1000 MILLILITER(S): 9 INJECTION INTRAMUSCULAR; INTRAVENOUS; SUBCUTANEOUS at 20:42

## 2020-02-16 NOTE — ED PROVIDER NOTE - NSFOLLOWUPINSTRUCTIONS_ED_ALL_ED_FT
Take motrin 600mg every 6 hours as needed for pain.   Return to the ED if any worsening or persistent symptoms.     Migraña    LO QUE NECESITA SABER:    Atilio migraña es un dolor de sonali intenso. El dolor puede ser tan severo que interfiere con belkys actividades cotidianas. Atilio migraña puede durar desde pocas horas hasta varios días. La causa exacta de la migraña no es conocida.    INSTRUCCIONES SOBRE EL SUKHWINDER HOSPITALARIA:    Regrese a la nicki de emergencias si:    Usted tiene dolor de sonali que parece ser diferente o mucho peor que nguyen migraña habitual.      Usted tiene un dolor de sonali severo con fiebre o rigidez en el brien.      Usted tiene nuevos problemas con el habla, la visión, el equilibrio o el movimiento.      Usted siente que se va a desmayar, se siente confundido o sufre atilio convulsión.    Comuníquese con nguyen médico o neurólogo si:    Nguyen migraña interfiere con belkys actividades cotidianas.      Belkys medicamentos o tratamientos ashutosh de funcionar.      Usted tiene preguntas o inquietudes acerca de nguyen condición o cuidado.    Medicamentos:Usted podría necesitar alguno de los siguientes. [place] medicamento tan pronto jose david sienta que le comienza atilio migraña.    Puede administrarsepodrían administrarse. No espere a que el dolor sea muy intenso para lefty el medicamento.      Medicamentos para la migrañase usa para evitar atilio migraña o detenerla atilio vez que comience.      Los medicamentos contra las náuseaspueden darse para calmar nguyen estómago y ayudarle a prevenir los vómitos. [place] medicamento también puede aliviar el dolor.      [place] belkys medicamentos jose david se le haya indicado.Consulte con nguyen médico si usted renny que nguyen medicamento no le está ayudando o si presenta efectos secundarios. Infórmele si es alérgico a cualquier medicamento. Mantenga atilio lista actualizada de los medicamentos, las vitaminas y los productos herbales que barb. Incluya los siguientes datos de los medicamentos: cantidad, frecuencia y motivo de administración. Traiga con usted la lista o los envases de las píldoras a belkys citas de seguimiento. Lleve la lista de los medicamentos con usted en elda de atilio emergencia.    El manejo de belkys síntomas:    Repose en atilio habitación oscura y tranquila.[place] ayudará a disminuir el dolor. El dormir también podría ayudarlo a aliviar nguyen dolor.      Aplique hielo para reducir el dolor.Use atilio compresa de hielo o ponga hielo triturado en atilio bolsa de plástico. Cubra el paquete de hielo con atilio toalla y colóqueselo en la sonali. Aplique hielo padilla 15 a 20 minutos cada hora.      Aplique calor para disminuir el dolor y los espasmos musculares.Utilice atilio toalla pequeña empapada con [place], atilio almohada térmica o tome un baño de anthony con agua tibia. Aplique la compresa caliente sobre el área por 20 a 30 minutos cada 2 horas. Usted puede alternar el calor y el hielo.      Mantenga un registro de las migrañas.Escriba cuándo comienzan y terminan belkys migrañas. Incluya belkys síntomas y qué estaba haciendo cuando comenzó atilio migraña. Registre lo que comió y lo que tomó las 24 horas antes de que comenzó nguyen migraña. Mantenga un registro de lo que hizo para tratar nguyen migraña y si funcionó. Traiga el registro de las migrañas con usted a las citas con nguyen médico.    Programe atilio luis con nguyen médico o nguyen neurólogo jose david se le indique:Lleve nguyen registro de migrañas con usted. Anote belkys preguntas para que se acuerde de hacerlas padilla belkys visitas.    Evite otra migraña:    No fume.La nicotina y otras sustancias químicas en los cigarrillos y puros pueden desencadenar atilio migraña o agravarla. Pida información a nguyen médico si usted actualmente fuma y necesita ayuda para dejar de fumar. Los cigarrillos electrónicos o el tabaco sin humo igualmente contienen nicotina. Consulte con nguyen médico antes de utilizar estos productos.      No consuma alcohol.El alcohol puede provocar migraña. También puede impedir que tengan efecto los medicamentos para la migraña.      Ejercítese regularmente.El ejercicio puede ayudar a evitar migrañas. Consulte con nguyen médico acerca de cuál es el mejor régimen de ejercicio para usted. Trate de hacer unos 30 minutos de ejercicio todos los días que pueda.      Controle el estrés.El estrés podría provocar migraña. Aprenda nuevas maneras de relajarse jose david la respiración profunda.      Establezca un horario para dormir.Acuéstese y levántese a la misma hora cada día. No denise televisión inmediatamente antes de acostarse.      Coma belkys comidas regularmente.Incluya alimentos saludables jose david la fruta, verduras, panes de grano entero, productos lácteos bajos en grasa, frijoles, carne magra y pescado. No consuma alimentos o bebidas que puedan desencadenar belkys migrañas.

## 2020-02-16 NOTE — ED PROVIDER NOTE - PATIENT PORTAL LINK FT
You can access the FollowMyHealth Patient Portal offered by [facility] by registering at the following website: [email or web address]. By joining [facility]’s FollowMyHealth portal, you will also be able to view your health information using other applications (apps) compatible with our system.

## 2020-02-16 NOTE — ED ADULT NURSE NOTE - OBJECTIVE STATEMENT
54 yr old female ambulatory to ED A&Ox3 presents with + lt sided headache associated with nausea.  Pt reports that she was shopping at the mall and started to feel weak.  She took her b/p medication which she was supposed to take in the morning.  Pt states that she started to have an intermittent headache localized to the left side and had nausea. Pt denies any chest pain or SOB.  Denies any numbness, tingling, or weakness to ext.  PERRL @ 3mm. No acute resp distress noted. Resp even and unlabored. Abd soft, NT, ND. +BS. +PP. DORSEY. Skin warm, dry, intact, and normal for race. 20 G IV placed to RT AC. Bloods obtained and sent. Son at bedside. Safety maintained.

## 2020-02-16 NOTE — ED PROVIDER NOTE - CLINICAL SUMMARY MEDICAL DECISION MAKING FREE TEXT BOX
54 yr old female with hx of HTN, headaches presents with left sided headache x 1 hour. + photophobia, nausea. Typical symptoms for migraine headache. No fever, chills, chest pain, sob, dizziness or any other symptoms. Pt took bp meds today. Denies taking any pain medications. No family members with headaches. No recent travel   neurologically intact 54 yr old female with hx of HTN, headaches presents with left sided headache x 1 hour. + photophobia, nausea. Typical symptoms for migraine headache. No fever, chills, chest pain, sob, dizziness or any other symptoms. Pt took bp meds today. Denies taking any pain medications. No family members with headaches. No recent travel ; Pt is neurologically intact;   Lab EKG CT brain CXR and pain relief. Reassessment: 54 yr old female with hx of HTN, headaches presents with left sided headache x 1 hour. + photophobia, nausea. Typical symptoms for migraine headache. No fever, chills, chest pain, sob, dizziness or any other symptoms. Pt took bp meds today. Denies taking any pain medications. No family members with headaches. No recent travel ; Pt is neurologically intact;   Lab EKG CT brain CXR and pain relief. Reassessment: pain resolved   stable for dc and follow up with pmd/ neurologist

## 2020-02-16 NOTE — ED PROVIDER NOTE - OBJECTIVE STATEMENT
54 yr old female with hx of HTN, headaches presents with left sided headache x 1 hour. + photophobia, nausea. Typical symptoms for migraine headache. No fever, chills, chest pain, sob, dizziness or any other symptoms. Pt took bp meds today. Denies taking any pain medications. No family members with headaches. No recent travel

## 2020-02-16 NOTE — ED PROVIDER NOTE - CARE PROVIDER_API CALL
Wally Miller (MD)  Neurology  [address], [address]  [place], [place] [id number]  Phone: [phone]  Fax: [phone]  Follow Up Time:

## 2020-02-16 NOTE — ED PROVIDER NOTE - ATTENDING CONTRIBUTION TO CARE
Goldie with KISHA Harris. 54 yr old female with hx of HTN, headaches presents with left sided headache x 1 hour. + photophobia, nausea. Typical symptoms for migraine headache. No fever, chills, chest pain, sob, dizziness or any other symptoms. Pt took bp meds today. Denies taking any pain medications. No family members with headaches. No recent travel ; Pt is neurologically intact;   Lab EKG CT brain CXR and pain relief. WIll reassess. I performed a face to face bedside interview with patient regarding history of present illness, review of symptoms and past medical history. I completed an independent physical exam.  I have discussed the patient's plan of care with Physician Assistant (PA). I agree with note as stated above, having amended the EMR as needed to reflect my findings.   This includes History of Present Illness, HIV, Past Medical/Surgical/Family/Social History, Allergies and Home Medications, Review of Systems, Physical Exam, and any Progress Notes during the time I functioned as the attending physician for this patient. No

## 2020-02-16 NOTE — ED ADULT NURSE NOTE - NSIMPLEMENTINTERV_GEN_ALL_ED
Implemented All Universal Safety Interventions:  [place] to call system. Call bell, personal items and telephone within reach. Instruct patient to call for assistance. Room bathroom lighting operational. Non-slip footwear when patient is off stretcher. Physically safe environment: no spills, clutter or unnecessary equipment. Stretcher in lowest position, wheels locked, appropriate side rails in place.

## 2020-02-18 DIAGNOSIS — R42 DIZZINESS AND GIDDINESS: ICD-10-CM

## 2020-02-21 DIAGNOSIS — R51 HEADACHE: ICD-10-CM

## 2020-02-21 PROBLEM — R42 DIZZINESS AND GIDDINESS: Chronic | Status: ACTIVE | Noted: 2020-02-16

## 2020-02-23 NOTE — HISTORY OF PRESENT ILLNESS
[Patient Declined  Services] : - None: Patient declined  services [FreeTextEntry8] : 54 year old F with uncontrolled hypertension presenting to clinic. Patient last seen in clinic on 1/14/2020 and advised to resume chlorthalidone 50mg and Losartan 100mg daily to treat uncontrolled blood pressure. \par \par Presenting to clinic because 3 weeks ago began to feel weak, shaking when she takes the medications\par takes medication in AM, begins to feel symptoms in the evening\par improved with drinking water and walking- resolves by going to sleep

## 2020-02-23 NOTE — PLAN
[FreeTextEntry1] : \par \par 54 year old F as above presenting to clinic c/o of dizziness.\par  \par #Dizziness\par - etiology unclear at this time\par - less likely that dizziness is adverse effects of blood pressure medication as patient takes medicine at ~10am and symptoms do not begin until ~ 3 pm. \par - possibly due to hypoglycemia as patient has tendency to skip meals. Advised regular meals\par - advised keeping symptom diary until next appointment\par \par #HTN\par - BP mildly elevated in clinic\par - continue Chlorthalidone and Losartan as ordered\par \par RTC 2 weeks to f/u Dizziness and review symptom diary\par Discussed with Dr. Anaya.

## 2020-03-14 ENCOUNTER — RX RENEWAL (OUTPATIENT)
Age: 55
End: 2020-03-14

## 2020-04-08 ENCOUNTER — INPATIENT (INPATIENT)
Facility: HOSPITAL | Age: 55
LOS: 1 days | Discharge: HOME | DRG: 65 | End: 2020-04-10
Attending: INTERNAL MEDICINE | Admitting: INTERNAL MEDICINE
Payer: COMMERCIAL

## 2020-04-08 VITALS
RESPIRATION RATE: 17 BRPM | DIASTOLIC BLOOD PRESSURE: 98 MMHG | SYSTOLIC BLOOD PRESSURE: 143 MMHG | WEIGHT: 162.92 LBS | HEART RATE: 66 BPM | OXYGEN SATURATION: 99 % | TEMPERATURE: 98 F

## 2020-04-08 DIAGNOSIS — I63.9 CEREBRAL INFARCTION, UNSPECIFIED: ICD-10-CM

## 2020-04-08 DIAGNOSIS — Z98.891 HISTORY OF UTERINE SCAR FROM PREVIOUS SURGERY: Chronic | ICD-10-CM

## 2020-04-08 DIAGNOSIS — I10 ESSENTIAL (PRIMARY) HYPERTENSION: ICD-10-CM

## 2020-04-08 DIAGNOSIS — Z90.49 ACQUIRED ABSENCE OF OTHER SPECIFIED PARTS OF DIGESTIVE TRACT: Chronic | ICD-10-CM

## 2020-04-08 LAB
ALBUMIN SERPL ELPH-MCNC: 3.8 G/DL — SIGNIFICANT CHANGE UP (ref 3.3–5)
ALP SERPL-CCNC: 114 U/L — SIGNIFICANT CHANGE UP (ref 40–120)
ALT FLD-CCNC: 31 U/L — SIGNIFICANT CHANGE UP (ref 10–45)
ANION GAP SERPL CALC-SCNC: 9 MMOL/L — SIGNIFICANT CHANGE UP (ref 5–17)
APTT BLD: 30.7 SEC — SIGNIFICANT CHANGE UP (ref 27.5–36.3)
AST SERPL-CCNC: 16 U/L — SIGNIFICANT CHANGE UP (ref 10–40)
BASOPHILS # BLD AUTO: 0.06 K/UL — SIGNIFICANT CHANGE UP (ref 0–0.2)
BASOPHILS NFR BLD AUTO: 0.7 % — SIGNIFICANT CHANGE UP (ref 0–2)
BILIRUB SERPL-MCNC: 0.3 MG/DL — SIGNIFICANT CHANGE UP (ref 0.2–1.2)
BUN SERPL-MCNC: 21 MG/DL — SIGNIFICANT CHANGE UP (ref 7–23)
CALCIUM SERPL-MCNC: 9.3 MG/DL — SIGNIFICANT CHANGE UP (ref 8.4–10.5)
CHLORIDE SERPL-SCNC: 99 MMOL/L — SIGNIFICANT CHANGE UP (ref 96–108)
CO2 SERPL-SCNC: 28 MMOL/L — SIGNIFICANT CHANGE UP (ref 22–31)
CREAT SERPL-MCNC: 0.94 MG/DL — SIGNIFICANT CHANGE UP (ref 0.5–1.3)
EOSINOPHIL # BLD AUTO: 0.26 K/UL — SIGNIFICANT CHANGE UP (ref 0–0.5)
EOSINOPHIL NFR BLD AUTO: 2.8 % — SIGNIFICANT CHANGE UP (ref 0–6)
GLUCOSE SERPL-MCNC: 117 MG/DL — HIGH (ref 70–99)
HCT VFR BLD CALC: 43.9 % — SIGNIFICANT CHANGE UP (ref 34.5–45)
HGB BLD-MCNC: 15 G/DL — SIGNIFICANT CHANGE UP (ref 11.5–15.5)
IMM GRANULOCYTES NFR BLD AUTO: 0.3 % — SIGNIFICANT CHANGE UP (ref 0–1.5)
INR BLD: 1.04 RATIO — SIGNIFICANT CHANGE UP (ref 0.88–1.16)
LYMPHOCYTES # BLD AUTO: 2.27 K/UL — SIGNIFICANT CHANGE UP (ref 1–3.3)
LYMPHOCYTES # BLD AUTO: 24.6 % — SIGNIFICANT CHANGE UP (ref 13–44)
MCHC RBC-ENTMCNC: 28.3 PG — SIGNIFICANT CHANGE UP (ref 27–34)
MCHC RBC-ENTMCNC: 34.2 GM/DL — SIGNIFICANT CHANGE UP (ref 32–36)
MCV RBC AUTO: 82.8 FL — SIGNIFICANT CHANGE UP (ref 80–100)
MONOCYTES # BLD AUTO: 0.87 K/UL — SIGNIFICANT CHANGE UP (ref 0–0.9)
MONOCYTES NFR BLD AUTO: 9.4 % — SIGNIFICANT CHANGE UP (ref 2–14)
NEUTROPHILS # BLD AUTO: 5.74 K/UL — SIGNIFICANT CHANGE UP (ref 1.8–7.4)
NEUTROPHILS NFR BLD AUTO: 62.2 % — SIGNIFICANT CHANGE UP (ref 43–77)
NRBC # BLD: 0 /100 WBCS — SIGNIFICANT CHANGE UP (ref 0–0)
PLATELET # BLD AUTO: 369 K/UL — SIGNIFICANT CHANGE UP (ref 150–400)
POTASSIUM SERPL-MCNC: 3.4 MMOL/L — LOW (ref 3.5–5.3)
POTASSIUM SERPL-SCNC: 3.4 MMOL/L — LOW (ref 3.5–5.3)
PROT SERPL-MCNC: 8.1 G/DL — SIGNIFICANT CHANGE UP (ref 6–8.3)
PROTHROM AB SERPL-ACNC: 11.7 SEC — SIGNIFICANT CHANGE UP (ref 10–12.9)
RBC # BLD: 5.3 M/UL — HIGH (ref 3.8–5.2)
RBC # FLD: 13.5 % — SIGNIFICANT CHANGE UP (ref 10.3–14.5)
SODIUM SERPL-SCNC: 136 MMOL/L — SIGNIFICANT CHANGE UP (ref 135–145)
TROPONIN I SERPL-MCNC: <.017 NG/ML — LOW (ref 0.02–0.06)
WBC # BLD: 9.23 K/UL — SIGNIFICANT CHANGE UP (ref 3.8–10.5)
WBC # FLD AUTO: 9.23 K/UL — SIGNIFICANT CHANGE UP (ref 3.8–10.5)

## 2020-04-08 PROCEDURE — 93010 ELECTROCARDIOGRAM REPORT: CPT

## 2020-04-08 PROCEDURE — 99285 EMERGENCY DEPT VISIT HI MDM: CPT

## 2020-04-08 PROCEDURE — 70498 CT ANGIOGRAPHY NECK: CPT | Mod: 26

## 2020-04-08 PROCEDURE — 99223 1ST HOSP IP/OBS HIGH 75: CPT

## 2020-04-08 PROCEDURE — 70496 CT ANGIOGRAPHY HEAD: CPT | Mod: 26

## 2020-04-08 PROCEDURE — 71045 X-RAY EXAM CHEST 1 VIEW: CPT | Mod: 26

## 2020-04-08 RX ORDER — ASPIRIN/CALCIUM CARB/MAGNESIUM 324 MG
325 TABLET ORAL ONCE
Refills: 0 | Status: COMPLETED | OUTPATIENT
Start: 2020-04-08 | End: 2020-04-08

## 2020-04-08 RX ORDER — ASPIRIN/CALCIUM CARB/MAGNESIUM 324 MG
81 TABLET ORAL DAILY
Refills: 0 | Status: DISCONTINUED | OUTPATIENT
Start: 2020-04-08 | End: 2020-04-10

## 2020-04-08 RX ORDER — ENOXAPARIN SODIUM 100 MG/ML
40 INJECTION SUBCUTANEOUS DAILY
Refills: 0 | Status: DISCONTINUED | OUTPATIENT
Start: 2020-04-08 | End: 2020-04-10

## 2020-04-08 RX ORDER — SODIUM CHLORIDE 9 MG/ML
1000 INJECTION INTRAMUSCULAR; INTRAVENOUS; SUBCUTANEOUS ONCE
Refills: 0 | Status: COMPLETED | OUTPATIENT
Start: 2020-04-08 | End: 2020-04-08

## 2020-04-08 RX ORDER — ATORVASTATIN CALCIUM 80 MG/1
40 TABLET, FILM COATED ORAL AT BEDTIME
Refills: 0 | Status: DISCONTINUED | OUTPATIENT
Start: 2020-04-08 | End: 2020-04-09

## 2020-04-08 RX ORDER — SODIUM CHLORIDE 9 MG/ML
1000 INJECTION INTRAMUSCULAR; INTRAVENOUS; SUBCUTANEOUS
Refills: 0 | Status: DISCONTINUED | OUTPATIENT
Start: 2020-04-08 | End: 2020-04-09

## 2020-04-08 RX ADMIN — SODIUM CHLORIDE 75 MILLILITER(S): 9 INJECTION INTRAMUSCULAR; INTRAVENOUS; SUBCUTANEOUS at 21:15

## 2020-04-08 RX ADMIN — SODIUM CHLORIDE 1000 MILLILITER(S): 9 INJECTION INTRAMUSCULAR; INTRAVENOUS; SUBCUTANEOUS at 16:19

## 2020-04-08 RX ADMIN — SODIUM CHLORIDE 1000 MILLILITER(S): 9 INJECTION INTRAMUSCULAR; INTRAVENOUS; SUBCUTANEOUS at 17:19

## 2020-04-08 RX ADMIN — Medication 325 MILLIGRAM(S): at 16:19

## 2020-04-08 RX ADMIN — ATORVASTATIN CALCIUM 40 MILLIGRAM(S): 80 TABLET, FILM COATED ORAL at 21:15

## 2020-04-08 NOTE — ED PROVIDER NOTE - CARE PLAN
Principal Discharge DX:	Dizziness Principal Discharge DX:	Cerebrovascular accident (CVA), unspecified mechanism

## 2020-04-08 NOTE — H&P ADULT - NSHPPHYSICALEXAM_GEN_ALL_CORE
ICU Vital Signs Last 24 Hrs  T(C): 36.6 (08 Apr 2020 18:41), Max: 36.6 (08 Apr 2020 14:08)  T(F): 97.8 (08 Apr 2020 18:41), Max: 97.8 (08 Apr 2020 14:08)  HR: 65 (08 Apr 2020 18:41) (61 - 66)  BP: 149/83 (08 Apr 2020 18:41) (143/98 - 157/87)  RR: 18 (08 Apr 2020 18:41) (17 - 18)  SpO2: 99% (08 Apr 2020 18:41) (99% - 99%)

## 2020-04-08 NOTE — ED PROVIDER NOTE - CLINICAL SUMMARY MEDICAL DECISION MAKING FREE TEXT BOX
Dr. Reynolds:  55F h/o HTN, migraine HA p/w intermittent dizziness, unsteady gait and right face/arm/leg heaviness. No difficulty speaking. On exam pt is well appearing, nad, rrr, ctab, abdo soft/nt/nd, normal FTN and HTS bilaterally. Unable to ambulate due to ataxia. NIHSS 0. D/w telestroke neuro Libman, concern for possible posterior circ CVA > 24 hours, will get CT, CTA and reassess.

## 2020-04-08 NOTE — ED ADULT TRIAGE NOTE - CHIEF COMPLAINT QUOTE
pt presents to ED with c/o dizziness that began at 2am pt presents to ED with c/o dizziness that began at 2pm. KISHA Berry to triage to examine pt.

## 2020-04-08 NOTE — H&P ADULT - ATTENDING COMMENTS
seen and examined at bedside. all symptoms resolved. exam none acute. labs/imaging and notes reviewed. agree with above a/p with following addition. radiology was concerned about CVA related to COVID19 [ as per ER MD]. COVID swab sent and patient was kept in resp and contact isolation. await neuro abd cardio eval seen and examined at bedside. all symptoms resolved. exam none acute. labs/imaging and notes reviewed. agree with above a/p with following addition. radiology was concerned about CVA related to COVID19 [ as per ER MD]. COVID swab sent and patient was kept in resp and contact isolation. bedside swallow eval by RN or SLP before starting diet. aspiraiton/fall precautions. NIHSS stroke protocol. Neurocheck Q4hrs. await neuro abd cardio eval

## 2020-04-08 NOTE — H&P ADULT - PROBLEM SELECTOR PLAN 2
- hold home losartan and Chlorithalidone - hold home losartan and Chlorthalidone as allowing for permissive HTN at this time

## 2020-04-08 NOTE — ED ADULT NURSE NOTE - OBJECTIVE STATEMENT
pt presents to ED with c/o dizziness that began at 2am with associated right sided "heaviness". pt presents to ED with c/o dizziness that began at 2am with associated right sided "heaviness" and weakness

## 2020-04-08 NOTE — ED PROVIDER NOTE - OBJECTIVE STATEMENT
#[id number] and [id number]  54 y/o F with PMH of HTN and migraine HERMAN presents to the ED with c/o intermittent dizziness and right sided heaviness and numbness x 2pm yesterday. Patient reports she initially had a left sided HA yesterday with her symptoms, but the HA resolved with Tylenol. Reports her right sided heaviness and numbness did not completely resolve since 2pm yesterday. Reports today at ~2pm she felt an episode of right sided heaviness and numbness again, however this time it was worse and she was unable to walk "felt like she was dragging her right leg". Reports she also could not get her thoughts or words out. Reports her symptoms are now improving. She denies CP, SOB, palpitations, visual changes, n/v or all other complaints       PCP- FP clinic  #[id number] and [id number]  54 y/o F with PMH of HTN and migraine HERMAN presents to the ED with c/o intermittent dizziness and right sided heaviness and numbness x 2pm yesterday. Patient reports she initially had a left sided HA yesterday with her symptoms, but the HA resolved with Tylenol. Reports her right sided heaviness and numbness did not completely resolve since 2pm yesterday. Reports today at ~2pm she felt an episode of right sided heaviness and numbness again, however this time it was worse and she was unable to walk "felt like she was dragging her right leg". Reports she also could not get her thoughts or words out. Reports her symptoms are now improving. She denies CP, SOB, palpitations, visual changes, n/v or all other complaints     PCP- FP clinic

## 2020-04-08 NOTE — ED PROVIDER NOTE - NS ED MD TWO NIGHTS YN
MDR's with Dr Crabtree.  Patient is day 18 of MEC.  ANC 0.  Head swelling/cellulitis resolved.   IV abx de-escalated to PO today.  Next BMB planned for Friday.  D/c pending results and count recovery.  Will continue to follow.     Yes

## 2020-04-08 NOTE — ED ADULT NURSE NOTE - NSIMPLEMENTINTERV_GEN_ALL_ED
Implemented All Universal Safety Interventions:  [place] to call system. Call bell, personal items and telephone within reach. Instruct patient to call for assistance. Room bathroom lighting operational. Non-slip footwear when patient is off stretcher. Physically safe environment: no spills, clutter or unnecessary equipment. Stretcher in lowest position, wheels locked, appropriate side rails in place.

## 2020-04-08 NOTE — ED ADULT NURSE REASSESSMENT NOTE - NS ED NURSE REASSESS COMMENT FT1
Patient resting comfortably in bed. Night time medications given as ordered. Fluids infusing as ordered. IV intact with no s/s of infiltration. Extremities equal and strong. Pupils equal and reactive to light. Denies dizziness. Denies headache. Follows commands. Patient answers questions appropriately. No acute distress noted at this time. Rounding performed. Plan of care and wait time explained. Call bell in reach. Will continue to monitor. Safety maintained. Kindra Alex RN
Report received from outgoing RN. Patient resting comfortably in bed. Extremities equal and strong. Pupils equal and reactive to light. Denies dizziness. Denies headache. Follows commands. Patient answers questions appropriately. Rounding performed. Plan of care and wait time explained. Call bell in reach. No acute distress noted at this time. Will continue to monitor. Safety maintained. Kindra Alex RN

## 2020-04-08 NOTE — ED ADULT NURSE REASSESSMENT NOTE - COMFORT CARE
darkened lights/po fluids offered/wait time explained/warm blanket provided/side rails up/plan of care explained

## 2020-04-08 NOTE — H&P ADULT - NEGATIVE OPHTHALMOLOGIC SYMPTOMS
no lacrimation L/no diplopia/no photophobia/no lacrimation R/no blurred vision L/no blurred vision R/no loss of vision R/no loss of vision L

## 2020-04-08 NOTE — H&P ADULT - HISTORY OF PRESENT ILLNESS
54 year old F w/ HTN presenting to ED complaining of Right sided weakness and difficulty walking since this morning. Denies any vision changes or slurred speech. Patient states that she had similar symptoms 4 times yesterday that resolved on its own. 54 year old F w/ HTN presenting to ED complaining of Right sided weakness and difficulty walking since this morning. States that her hand and her leg felt "heavy". Denies any vision changes, slurred speech, numbness or tingling. Symptoms resolved on own in ED. Patient states that she had similar symptoms (right hand tingling) 4 times yesterday that resolved on its own, but then was followed by a left sided frontal headache last night.    States that she takes BP medications regularly. 54 year old F w/ HTN and h/o migraines presenting to ED complaining of Right sided weakness and difficulty walking since this morning. States that her hand and her leg felt "heavy". Denies any vision changes, slurred speech, numbness or tingling. Symptoms resolved on own in ED. Patient states that she had similar symptoms (right hand tingling) 4 times yesterday that resolved on its own, but then was followed by a left sided frontal headache last night.    States that she takes BP medications regularly. 54 year old F w/ HTN and h/o migraines self-presented to ED complaining of Right sided weakness and difficulty walking. States that her hand and her leg felt "heavy". Felt like she couldn't get her words and thoughts out when her body felt heavy. Unsure of onset of symptoms. Denies any vision changes, numbness or tingling at that time. Symptoms resolved on own in ED. Patient states that she had similar symptoms (right hand tingling) 4 times yesterday that resolved on its own, but then was followed by a left sided frontal headache last night that resolved with tylenol.    States that she takes BP medications regularly.     IN ED Code stroke called. NIHSS 0.

## 2020-04-08 NOTE — H&P ADULT - PROBLEM SELECTOR PLAN 1
- likely TIA  as patients symptoms resolved  - allow for permissive Hypertension. Will only treat HTN SBP > 220 - admit to tele  -TPA not given as it is unclear when symptoms began  - start ASA and statin  - continue maintenance IVF- NS @ 75cc/hr   - Allow for permissive Hypertension. Will only treat HTN SBP > 220/120  - Neurology consult to be called  - Cardiology consult to be called re: possible embolic stroke and need for possible ELKE  - f/u Lipid panel and HgA1C to be drawn in AM  - DASH diet  - PT evaluation ordered

## 2020-04-08 NOTE — ED PROVIDER NOTE - PROGRESS NOTE DETAILS
KISHA Llamas: CTA head and neck results reviewed. Spoke with telestroke neurologist Dr. Libman. He states patients symptoms are over 24 hours, no intervention. He believes this was an embolic event. He recommends ASA, and keeping her hypertensive. Do Not treat BP unless it is >220/120, keep her systolic BP btw 140-170. Keep maintenance fluids on her. Spoke with FP resident. Patient admitted

## 2020-04-08 NOTE — H&P ADULT - ASSESSMENT
54 year old F w/ HTN and h/o migraines presenting to ED complaining of Right sided weakness and difficulty walking since this morning. 54 year old F w/ HTN and h/o migraines presenting to ED complaining of Right sided weakness and difficulty walking since this morning.    IMPROVE VTE Individual Risk Assessment    RISK                                                                Points    [  ] Previous VTE                                                  3    [  ] Thrombophilia                                               2    [  ] Lower limb paralysis                                      2        (unable to hold up >15 seconds)      [  ] Current Cancer                                              2         (within 6 months)    [ x ] Immobilization > 24 hrs                                1    [  ] ICU/CCU stay > 24 hours                              1    [  ] Age > 60                                                      1    IMPROVE VTE Score ___1______    IMPROVE Score 0-1: Low Risk, No VTE prophylaxis required for most patients, encourage ambulation.   IMPROVE Score 2-3: At risk, pharmacologic VTE prophylaxis is indicated for most patients (in the absence of a contraindication)  IMPROVE Score > or = 4: High Risk, pharmacologic VTE prophylaxis is indicated for most patients (in the absence of a contraindication)

## 2020-04-09 LAB
ALBUMIN SERPL ELPH-MCNC: 3.4 G/DL — SIGNIFICANT CHANGE UP (ref 3.3–5)
ALP SERPL-CCNC: 106 U/L — SIGNIFICANT CHANGE UP (ref 40–120)
ALT FLD-CCNC: 29 U/L — SIGNIFICANT CHANGE UP (ref 10–45)
ANION GAP SERPL CALC-SCNC: 9 MMOL/L — SIGNIFICANT CHANGE UP (ref 5–17)
AST SERPL-CCNC: 14 U/L — SIGNIFICANT CHANGE UP (ref 10–40)
BASOPHILS # BLD AUTO: 0.04 K/UL — SIGNIFICANT CHANGE UP (ref 0–0.2)
BASOPHILS NFR BLD AUTO: 0.6 % — SIGNIFICANT CHANGE UP (ref 0–2)
BILIRUB SERPL-MCNC: 0.7 MG/DL — SIGNIFICANT CHANGE UP (ref 0.2–1.2)
BUN SERPL-MCNC: 15 MG/DL — SIGNIFICANT CHANGE UP (ref 7–23)
CALCIUM SERPL-MCNC: 8.9 MG/DL — SIGNIFICANT CHANGE UP (ref 8.4–10.5)
CHLORIDE SERPL-SCNC: 102 MMOL/L — SIGNIFICANT CHANGE UP (ref 96–108)
CHOLEST SERPL-MCNC: 207 MG/DL — HIGH (ref 10–199)
CO2 SERPL-SCNC: 29 MMOL/L — SIGNIFICANT CHANGE UP (ref 22–31)
CREAT SERPL-MCNC: 0.7 MG/DL — SIGNIFICANT CHANGE UP (ref 0.5–1.3)
EOSINOPHIL # BLD AUTO: 0.18 K/UL — SIGNIFICANT CHANGE UP (ref 0–0.5)
EOSINOPHIL NFR BLD AUTO: 2.7 % — SIGNIFICANT CHANGE UP (ref 0–6)
GLUCOSE SERPL-MCNC: 113 MG/DL — HIGH (ref 70–99)
HBA1C BLD-MCNC: 6.2 % — HIGH (ref 4–5.6)
HCT VFR BLD CALC: 42.9 % — SIGNIFICANT CHANGE UP (ref 34.5–45)
HDLC SERPL-MCNC: 23 MG/DL — LOW
HGB BLD-MCNC: 14.3 G/DL — SIGNIFICANT CHANGE UP (ref 11.5–15.5)
IMM GRANULOCYTES NFR BLD AUTO: 0.3 % — SIGNIFICANT CHANGE UP (ref 0–1.5)
LIPID PNL WITH DIRECT LDL SERPL: 157 MG/DL — HIGH
LYMPHOCYTES # BLD AUTO: 1.48 K/UL — SIGNIFICANT CHANGE UP (ref 1–3.3)
LYMPHOCYTES # BLD AUTO: 22.1 % — SIGNIFICANT CHANGE UP (ref 13–44)
MCHC RBC-ENTMCNC: 28.1 PG — SIGNIFICANT CHANGE UP (ref 27–34)
MCHC RBC-ENTMCNC: 33.3 GM/DL — SIGNIFICANT CHANGE UP (ref 32–36)
MCV RBC AUTO: 84.3 FL — SIGNIFICANT CHANGE UP (ref 80–100)
MONOCYTES # BLD AUTO: 0.54 K/UL — SIGNIFICANT CHANGE UP (ref 0–0.9)
MONOCYTES NFR BLD AUTO: 8.1 % — SIGNIFICANT CHANGE UP (ref 2–14)
NEUTROPHILS # BLD AUTO: 4.43 K/UL — SIGNIFICANT CHANGE UP (ref 1.8–7.4)
NEUTROPHILS NFR BLD AUTO: 66.2 % — SIGNIFICANT CHANGE UP (ref 43–77)
NRBC # BLD: 0 /100 WBCS — SIGNIFICANT CHANGE UP (ref 0–0)
PLATELET # BLD AUTO: 331 K/UL — SIGNIFICANT CHANGE UP (ref 150–400)
POTASSIUM SERPL-MCNC: 3.7 MMOL/L — SIGNIFICANT CHANGE UP (ref 3.5–5.3)
POTASSIUM SERPL-SCNC: 3.7 MMOL/L — SIGNIFICANT CHANGE UP (ref 3.5–5.3)
PROT SERPL-MCNC: 7.6 G/DL — SIGNIFICANT CHANGE UP (ref 6–8.3)
RBC # BLD: 5.09 M/UL — SIGNIFICANT CHANGE UP (ref 3.8–5.2)
RBC # FLD: 13.6 % — SIGNIFICANT CHANGE UP (ref 10.3–14.5)
SARS-COV-2 RNA SPEC QL NAA+PROBE: SIGNIFICANT CHANGE UP
SODIUM SERPL-SCNC: 140 MMOL/L — SIGNIFICANT CHANGE UP (ref 135–145)
TOTAL CHOLESTEROL/HDL RATIO MEASUREMENT: 9 RATIO — HIGH (ref 3.3–7.1)
TRIGL SERPL-MCNC: 133 MG/DL — SIGNIFICANT CHANGE UP (ref 10–149)
WBC # BLD: 6.69 K/UL — SIGNIFICANT CHANGE UP (ref 3.8–10.5)
WBC # FLD AUTO: 6.69 K/UL — SIGNIFICANT CHANGE UP (ref 3.8–10.5)

## 2020-04-09 PROCEDURE — 93306 TTE W/DOPPLER COMPLETE: CPT | Mod: 26

## 2020-04-09 PROCEDURE — 99233 SBSQ HOSP IP/OBS HIGH 50: CPT

## 2020-04-09 PROCEDURE — 99222 1ST HOSP IP/OBS MODERATE 55: CPT

## 2020-04-09 RX ORDER — MIDODRINE HYDROCHLORIDE 2.5 MG/1
5 TABLET ORAL EVERY 8 HOURS
Refills: 0 | Status: DISCONTINUED | OUTPATIENT
Start: 2020-04-09 | End: 2020-04-10

## 2020-04-09 RX ORDER — ATORVASTATIN CALCIUM 80 MG/1
80 TABLET, FILM COATED ORAL AT BEDTIME
Refills: 0 | Status: DISCONTINUED | OUTPATIENT
Start: 2020-04-09 | End: 2020-04-10

## 2020-04-09 RX ORDER — CLOPIDOGREL BISULFATE 75 MG/1
75 TABLET, FILM COATED ORAL DAILY
Refills: 0 | Status: DISCONTINUED | OUTPATIENT
Start: 2020-04-09 | End: 2020-04-10

## 2020-04-09 RX ORDER — SODIUM CHLORIDE 9 MG/ML
1000 INJECTION INTRAMUSCULAR; INTRAVENOUS; SUBCUTANEOUS
Refills: 0 | Status: DISCONTINUED | OUTPATIENT
Start: 2020-04-09 | End: 2020-04-10

## 2020-04-09 RX ADMIN — MIDODRINE HYDROCHLORIDE 5 MILLIGRAM(S): 2.5 TABLET ORAL at 22:10

## 2020-04-09 RX ADMIN — ATORVASTATIN CALCIUM 80 MILLIGRAM(S): 80 TABLET, FILM COATED ORAL at 22:10

## 2020-04-09 RX ADMIN — Medication 81 MILLIGRAM(S): at 13:41

## 2020-04-09 RX ADMIN — ENOXAPARIN SODIUM 40 MILLIGRAM(S): 100 INJECTION SUBCUTANEOUS at 00:27

## 2020-04-09 NOTE — CONSULT NOTE ADULT - SUBJECTIVE AND OBJECTIVE BOX
Neurology consult    JEFFREY [id number]     Patient is a 54y old  Female who presents with a chief complaint of Rt sided weakness (2020 13:33)      HPI:  54 year old F w/ HTN and h/o migraines self-presented to ED complaining of Right sided weakness and difficulty walking. States that her hand and her leg felt "heavy". Felt like she couldn't get her words and thoughts out when her body felt heavy. Unsure of onset of symptoms. Denies any vision changes, numbness or tingling at that time. Symptoms resolved on own in ED. Patient states that she had similar symptoms (right hand tingling) 4 times yesterday that resolved on its own, but then was followed by a left sided frontal headache last night that resolved with tylenol.    States that she takes BP medications regularly.     IN ED Code stroke called. NIHSS 0. (2020 18:09)      REVIEW OF SYSTEMS:    Constitutional: No fever, chills, fatigue, weakness  Eyes: no eye pain, visual disturbances, or discharge  ENT:  No difficulty hearing, tinnitus, vertigo; No sinus or throat pain  Neck: No pain or stiffness  Respiratory: No cough, dyspnea, wheezing   Cardiovascular: No chest pain, palpitations,   Gastrointestinal: No abdominal or epigastric pain. No nausea, vomiting  No diarrhea or constipation.   Genitourinary: No dysuria, frequency, hematuria or incontinence  Neurological: No headaches, lightheadedness, vertigo, numbness or tremors  Psychiatric: No depression, anxiety, mood swings or difficulty sleeping  Musculoskeletal: No joint pain or swelling; No muscle, back or extremity pain  Skin: No itching, burning, rashes or lesions   Lymph Nodes: No enlarged glands  Endocrine: No heat or cold intolerance; No hair loss, No h/o diabetes or thyroid dysfunction  Allergy and Immunologic: No hives or eczema    MEDICATIONS    aspirin  chewable 81 milliGRAM(s) Oral daily  atorvastatin 40 milliGRAM(s) Oral at bedtime  enoxaparin Injectable 40 milliGRAM(s) SubCutaneous daily      PMH: Vertigo  Hypertension  Epigastric burning sensation       PSH: H/O:   S/P appendectomy  No significant past surgical history      Family history:   FAMILY HISTORY:  FH: HTN (hypertension)      SOCIAL HISTORY:  No history of tobacco or alcohol use     Allergies    amlodipine (Unknown)  Nyquil Cold Medicine (Unknown)  NyQuil D (Unknown)    Intolerances            Vital Signs Last 24 Hrs  T(C): 36.6 (2020 06:38), Max: 36.7 (2020 22:47)  T(F): 97.9 (2020 06:38), Max: 98 (2020 22:47)  HR: 66 (2020 06:38) (52 - 70)  BP: 129/79 (2020 06:38) (129/79 - 163/85)  BP(mean): --  RR: 18 (2020 06:38) (18 - 18)  SpO2: 98% (2020 06:38) (98% - 100%)      On Neurological Examination:    Head: normocephalic Neck: supple no carotid bruits    Mental Status - Patient is alert, awake, oriented X3. fluent, names, no dysarthria no aphasia Follows commands well and able to answer questions appropriately. Mood and affect  normal    Cranial Nerves - PERRL, EOMI, VFF, normal V through XII no nystagmus    Motor Exam :  No drift normal strength tone and coordination no abnormal movements no focality    Sensory    Intact to light touch and pinprick bilaterally normal DSS to touch    Reflexes:  symmetric @ 2+ plantars downgoing    Gait -  normal                                                          LABS:  CBC Full  -  ( 2020 07:31 )  WBC Count : 6.69 K/uL  RBC Count : 5.09 M/uL  Hemoglobin : 14.3 g/dL  Hematocrit : 42.9 %  Platelet Count - Automated : 331 K/uL  Mean Cell Volume : 84.3 fl  Mean Cell Hemoglobin : 28.1 pg  Mean Cell Hemoglobin Concentration : 33.3 gm/dL  Auto Neutrophil # : 4.43 K/uL  Auto Lymphocyte # : 1.48 K/uL  Auto Monocyte # : 0.54 K/uL  Auto Eosinophil # : 0.18 K/uL  Auto Basophil # : 0.04 K/uL  Auto Neutrophil % : 66.2 %  Auto Lymphocyte % : 22.1 %  Auto Monocyte % : 8.1 %  Auto Eosinophil % : 2.7 %  Auto Basophil % : 0.6 %      09    140  |  102  |  15  ----------------------------<  113<H>  3.7   |  29  |  0.70    Ca    8.9      2020 07:31    TPro  7.6  /  Alb  3.4  /  TBili  0.7  /  DBili  x   /  AST  14  /  ALT  29  /  AlkPhos  106  04-09    LIVER FUNCTIONS - ( 2020 07:31 )  Alb: 3.4 g/dL / Pro: 7.6 g/dL / ALK PHOS: 106 U/L / ALT: 29 U/L / AST: 14 U/L / GGT: x           Hemoglobin A1C: Hemoglobin A1C, Whole Blood: 6.2 % ( @ 07:31)    Lipid Panel  @ 07:31  Total Cholesterol, Serum 207    Triglycerides 133      PT/INR - ( 2020 14:56 )   PT: 11.7 sec;   INR: 1.04 ratio         PTT - ( 2020 14:56 )  PTT:30.7 sec  Total [id number] mg/dL   HDL 23 mg/dL   mg/dL          RADIOLOGY  CT:   < from: CT Angio Neck w/ IV Cont (20 @ 15:13) >    EXAM:  CT ANGIO BRAIN (W)AW IC    EXAM:  CT ANGIO NECK (W)AW IC    EXAM:  CT BRAIN      PROCEDURE DATE:  2020        INTERPRETATION:  HEAD CT, CT ANGIOGRAM OF THE CERVICAL ARTERIES   CTA OF THE [place]:    INDICATION: 54-year-old female, BPPV (benign paroxysmal positional vertigo), Bell's palsy, Benign paroxysmal positional vertigo, unspecified laterality, Chronic gastritis, Cyst of right breast, Depression, Dizziness, Fibroid,  Atypical chest pain, History of Costochondritis,  Symptomatic anemia,  Hyperlipidemia, Hypertension, Hypertensive emergency, no CHF, Influenza vaccine needed, Migraine, Mood changes, dizziness, right-sided tingling and heaviness,      TECHNIQUE:     HEAD CT: Contiguous axial images were obtained from the skull base to vertex and reconstructed in sagittal and coronal planes    CTA [place]:     After the intravenous power injection of non-ionic contrast material, serial thin sections were obtained through the intracranial circulation on a multislice CT scanner.  Images were reformatted using a dedicated 3D software package and viewed on a dedicated workstation in multiple planes.    CTA NECK:    After the intravenous power injection of non-ionic contrast material, serial thin sections were obtained through the cervical circulation on a multislice CT scanner.  Images were reformatted using a dedicated 3D software package and viewed on a dedicated workstation in multiple planes.    COMPARISON: HEAD CT 2020, 2019    FINDINGS:    HEAD CT:    Redemonstration, mild enlargement ventricles and sulci consistent with age-appropriate volume loss. Redemonstration remote lacunar infarction in left caudate head and left anterior limb internal capsule, and left lentiform nuclei with left lateral ventricle frontal horn ex vacuo enlargement., Redemonstration nonspecific white matter decreased attenuation, slightly more pronounced along the left posterior frontal, temporal, and parietal lobes, with age indeterminate lacunar infarcts. Unchanged incidental posterior fossa arachnoid cyst. No new intra or extra axial hemorrhage, midline shift, or extra axial fluid collections, the basal cisterns are patent.    Orbital globes are unremarkable. paranasal sinuses and mastoids are clear. Sphenoid sinuses pneumatized into anterior clinoids likely anatomic variation. 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    Dental streak artifact limits assessment of the oral cavity,  periapical lucency  teeth in the mandible and maxilla, likely ongoing dental disease, torus palatini. Slightly heterogeneous thyroid could be better assessed with thyroid ultrasound. Scattered patchy areas of opacification in the upper lung zone,please see dedicated report of the chest for full history of chest findings. Straightened cervical lordosis, thoracic dextrocurvature, there is posterior disc space narrowing, and degenerative change without  severe canal compromise.    CTA NECK AND [place]:  Aortic arch, great vessel origins are patent without flow-limiting stenosis. Tortuous bilateral common and internal carotid arteries likely reflecting hypertension no flow-limiting stenosis. No hemodynamically significant stenosis at bilateral ICA origins by NASCET criteria.    Bilateral dominant left and smaller right vertebral artery, both vertebral arteries are patent throughout their cervical course.    Internal carotid arteries are patent within the petrous cavernous, supraclinoid, and ICA bifurcation segments, fetal right PCA with a slight 2 mm bulging proximally, likely prominent infundibula, (4:359). The proximal right M1, and bilateral A 1's, anterior communicating, and A2 segments are patent without flow-limiting stenosis.    Severe flow-limiting 3 mm stenosis, proximal left M1 origin,, as well as slight poststenotic dilatation with multifocal stenosis along the distal left M1, and at the origin of the left M2 MCA branch vessels, with multifocal stenosis and decrease in size number the distal left M2 and M3 MCA branch vessels.    Dominant tortuous intradural left vertebral artery, and tortuous patent right vertebral artery crosses leftward, left posterior inferior cerebellar artery proximal to vertebrobasilar junction, vertebrobasilar junction and basilar artery are patent with a tortuous basilar artery crossing rightward. There is a dominant left P1 segment, with fetal origin of the right PCA and smaller patent right P1 segment, proximal posterior cerebral arteries are patent without flow-limiting stenosis.    IMPRESSION:    Redemonstration, mild volume loss, microvascular disease, remote remote left caudate head and left anterior limb internal capsule lentiform nuclei infarct, age indeterminate lacunar infarcts, and left lateral ventricle frontal horn ex vacuo enlargement.    Nonspecific slightly more pronounced white matter decreased attenuation, left frontal and parietal lobes, new ischemia not excluded, DWI MR more sensitive for new ischemia. No new hemorrhage or midline shift. Basal cisterns remain patent.    Stenosis proximal left M1 segment, multifocal stenosis, distal left M1, M2 and M3 distal branches slightly decreased in size and number.    Patent extracranial vessels, no hemodynamically significant stenosis at ICA origins by NASCET criteria.    Findings discussed Dr. Reynolds in the Emergency Department  at immediate time of review on 20 at 3:48 pm.         < end of copied text >

## 2020-04-09 NOTE — PROGRESS NOTE ADULT - ASSESSMENT
54 year old F w/ HTN and h/o migraines presenting to ED complaining of Right sided weakness and difficulty walking since this morning.    #Rt. sided weakness R/O CVA vs TIA  COVID negative  CTH, CTA findings noted : results as above  TPA not given as it is unclear when symptoms began  cont ASA and statin  Neuro eval: f/u neuro recs if need further imaging ?MRI  2d echo  PT eval    #HTN  holding losartan/chlorthalidon in view of allowing permissive hypertension for now  MOnitor BP, resume if BP >200/110    #DVt ppx 54 year old F w/ HTN and h/o migraines presenting to ED complaining of Right sided weakness and difficulty walking since this morning.    #Rt. sided weakness R/O CVA vs TIA, symptoms Improving   COVID negative  CTH, CTA findings noted : results as above  TPA not given as it is unclear when symptoms began  cont ASA and statin  Neuro eval: f/u neuro recs if need further imaging ?MRI  2d echo  Appreciate PT eval    #HTN  holding losartan/chlorthalidon in view of allowing permissive hypertension for now  MOnitor BP, resume if BP >200/110    #DVt ppx  Dispo : home when cleared by NEuro and if no further w/u needed

## 2020-04-09 NOTE — CONSULT NOTE ADULT - SUBJECTIVE AND OBJECTIVE BOX
Chief Complaint:   54 year old F w/ HTN and h/o migraines self-presented to ED complaining of Right sided weakness and difficulty walking. States that her hand and her leg felt "heavy". Felt like she couldn't get her words and thoughts out when her body felt heavy. Unsure of onset of symptoms. Denies any vision changes, numbness or tingling at that time. Symptoms resolved on own in ED. Patient states that she had similar symptoms (right hand tingling) 4 times yesterday that resolved on its own, but then was followed by a left sided frontal headache last night that resolved with tylenol. States that she takes BP medications regularly.  IN ED Code stroke called. NIHSS 0.       HPI:    PMH:   Vertigo  Hypertension  Epigastric burning sensation    PSH:   H/O:   S/P appendectomy  No significant past surgical history    Family History:  FAMILY HISTORY:  FH: HTN (hypertension)      Social History:  Smoking:  Alcohol:  Drugs:    Allergies:  amlodipine (Unknown)  Nyquil Cold Medicine (Unknown)  NyQuil D (Unknown)      Medications:  aspirin  chewable 81 milliGRAM(s) Oral daily  clopidogrel Tablet 75 milliGRAM(s) Oral daily  enoxaparin Injectable 40 milliGRAM(s) SubCutaneous daily  midodrine 5 milliGRAM(s) Oral every 8 hours  sodium chloride 0.9%. 1000 milliLiter(s) IV Continuous <Continuous>      REVIEW OF SYSTEMS:  CONSTITUTIONAL: No fever, weight loss, or fatigue  EYES: No eye pain, visual disturbances, or discharge  ENMT:  No difficulty hearing, tinnitus, vertigo; No sinus or throat pain  NECK: No pain or stiffness  BREASTS: No pain, masses, or nipple discharge  RESPIRATORY: No cough, wheezing, chills or hemoptysis; No shortness of breath  CARDIOVASCULAR: No chest pain, palpitations, dizziness, or leg swelling  GASTROINTESTINAL: No abdominal or epigastric pain. No nausea, vomiting, or hematemesis; No diarrhea or constipation. No melena or hematochezia.  GENITOURINARY: No dysuria, frequency, hematuria, or incontinence  NEUROLOGICAL: No headaches, memory loss, loss of strength, numbness, or tremors  SKIN: No itching, burning, rashes, or lesions   LYMPH NODES: No enlarged glands  ENDOCRINE: No heat or cold intolerance; No hair loss  MUSCULOSKELETAL: No joint pain or swelling; No muscle, back, or extremity pain  PSYCHIATRIC: No depression, anxiety, mood swings, or difficulty sleeping  HEME/LYMPH: No easy bruising, or bleeding gums  ALLERY AND IMMUNOLOGIC: No hives or eczema    Physical Exam:  T(C): 36.6 (20 @ 06:38), Max: 36.7 (20 @ 22:47)  HR: 66 (20 @ 06:38) (52 - 70)  BP: 129/79 (20 @ 06:38) (129/79 - 163/85)  RR: 18 (20 @ 06:38) (18 - 18)  SpO2: 98% (20 @ 06:38) (98% - 100%)  Wt(kg): --    GENERAL: NAD, well-groomed, well-developed  HEAD:  Atraumatic, Normocephalic  EYES: EOMI, conjunctiva and sclera clear  ENT: Moist mucous membranes,  NECK: Supple, No JVD, no bruits  CHEST/LUNG: Clear to percussion bilaterally; No rales, rhonchi, wheezing, or rubs  HEART: Regular rate and rhythm; No murmurs, rubs, or gallops PMI non displaced.  ABDOMEN: Soft, Nontender, Nondistended; Bowel sounds present  EXTREMITIES:  2+ Peripheral Pulses, No clubbing, cyanosis, or edema  SKIN: No rashes or lesions  NERVOUS SYSTEM:  Cranial Nerves II-XII intact     Cardiovascular Diagnostic Testing:  ECG:  < from: 12 Lead ECG (20 @ 14:29) >  Diagnosis Line Normal sinus rhythm  Moderate voltage criteria for LVH, may be normal variant  Nonspecific T wave abnormality  Abnormal ECG  When compared with ECG of 2020 20:51,  Nonspecific T wave abnormality no longer evident in Anterior leads    Confirmed by GA VERDUZCO MD () on 2020 9:14:40 AM    < end of copied text >        ECHO:    < from: TTE Echo Complete w/o contrast w/ Doppler (20 @ 13:41) >    Summary:   1. Left ventricular ejection fraction, by visual estimation, is 60 to 65%.   2. Normal global left ventricular systolic function.   3. Spectral Doppler shows impaired relaxation pattern of left ventricular myocardial filling (Grade I diastolic dysfunction).   4. Thinning of the interatrial septum.   5. Mild thickening and calcification of the anterior and posterior mitral valve leaflets.   6. Sclerotic aortic valve with normal opening.   7. Pulmonary hypertension is absent.   8. LA volume Index is 14.1 ml/m² ml/m2.    [id number] Ga Verduzco , Electronically signed on 2020 at 3:48:43 PM        *** Final ***      GA VERDUZCO   This document has been electronically signed. 2020  1:41PM    < end of copied text >      Labs:                        14.3   6.69  )-----------( 331      ( 2020 07:31 )             42.9     -    140  |  102  |  15  ----------------------------<  113<H>  3.7   |  29  |  0.70    Ca    8.9      2020 07:31    TPro  7.6  /  Alb  3.4  /  TBili  0.7  /  DBili  x   /  AST  14  /  ALT  29  /  AlkPhos  106  04-09    PT/INR - ( 2020 14:56 )   PT: 11.7 sec;   INR: 1.04 ratio         PTT - ( 2020 14:56 )  PTT:30.7 sec  CARDIAC MARKERS ( 2020 14:56 )  <.017 ng/mL / x     / x     / x     / x        Total Cholesterol: 207  LDL: 157  HDL: 23  T    Hemoglobin A1C, Whole Blood: 6.2 % ( @ 07:31)        Imaging:

## 2020-04-09 NOTE — CONSULT NOTE ADULT - ATTENDING COMMENTS
53 yo with a right hemiparesis  etiology is unclear. patient undergoing a neuro workup. discussed outpatient evaluation with monitor such as a 30 day monitor or even a loop recorder and possible ELKE to rule out Afib and a PFO respective. discussed wit son by phone jon.

## 2020-04-09 NOTE — PROGRESS NOTE ADULT - SUBJECTIVE AND OBJECTIVE BOX
Patient is a 54y old  Female who presents with a chief complaint of Rt sided weakness (08 Apr 2020 18:09)    HPI   54 year old F w/ HTN and h/o migraines self-presented to ED complaining of Right sided weakness and difficulty walking. States that her hand and her leg felt "heavy". Felt like she couldn't get her words and thoughts out when her body felt heavy. Unsure of onset of symptoms. Denies any vision changes, numbness or tingling at that time. Symptoms resolved on own in ED. Patient states that she had similar symptoms (right hand tingling) 4 times yesterday that resolved on its own, but then was followed by a left sided frontal headache last night that resolved with tylenol.    States that she takes BP medications regularly.   IN ED Code stroke called. NIHSS 0.       Interval Hx  Patient seen and examined at bedside. No overnight events reported. Pt reports improvement in her symptoms.    ALLERGIES:  amlodipine (Unknown)  Nyquil Cold Medicine (Unknown)  NyQuil D (Unknown)    MEDICATIONS  (STANDING):  aspirin  chewable 81 milliGRAM(s) Oral daily  atorvastatin 40 milliGRAM(s) Oral at bedtime  enoxaparin Injectable 40 milliGRAM(s) SubCutaneous daily  sodium chloride 0.9%. 1000 milliLiter(s) (75 mL/Hr) IV Continuous <Continuous>    MEDICATIONS  (PRN):    Vital Signs Last 24 Hrs  T(F): 97.9 (09 Apr 2020 06:38), Max: 98 (08 Apr 2020 22:47)  HR: 66 (09 Apr 2020 06:38) (52 - 70)  BP: 129/79 (09 Apr 2020 06:38) (129/79 - 163/85)  RR: 18 (09 Apr 2020 06:38) (17 - 18)  SpO2: 98% (09 Apr 2020 06:38) (98% - 100%)  I&O's Summary    08 Apr 2020 07:01  -  09 Apr 2020 07:00  --------------------------------------------------------  IN: 855 mL / OUT: 0 mL / NET: 855 mL      PHYSICAL EXAM:  General: NAD, A/O x 3  ENT: MMM, no thrush  Neck: Supple, No JVD  Lungs: Clear to auscultation bilaterally, good air entry, non-labored breathing  Cardio: RRR, S1/S2, No murmur  Abdomen: Soft, Nontender, Nondistended; Bowel sounds present  Extremities: No calf tenderness, No pitting edema    LABS:                        14.3   6.69  )-----------( 331      ( 09 Apr 2020 07:31 )             42.9     04-09    140  |  102  |  15  ----------------------------<  113  3.7   |  29  |  0.70    Ca    8.9      09 Apr 2020 07:31    TPro  7.6  /  Alb  3.4  /  TBili  0.7  /  DBili  x   /  AST  14  /  ALT  29  /  AlkPhos  106  04-09        eGFR if Non African American: 100 mL/min/1.73M2 (04-09-20 @ 07:31)  eGFR if African American: 116 mL/min/1.73M2 (04-09-20 @ 07:31)    PT/INR - ( 08 Apr 2020 14:56 )   PT: 11.7 sec;   INR: 1.04 ratio         PTT - ( 08 Apr 2020 14:56 )  PTT:30.7 sec      CARDIAC MARKERS ( 08 Apr 2020 14:56 )  <.017 ng/mL / x     / x     / x     / x          04-09 Chol 207 mg/dL  mg/dL HDL 23 mg/dL Trig 133 mg/dL              POCT Blood Glucose.: 120 mg/dL (08 Apr 2020 14:54)          RADIOLOGY & ADDITIONAL TESTS:    < from: CT Head No Cont (04.08.20 @ 15:13) >  Redemonstration, mild volume loss, microvascular disease, remote remote left caudate head and left anterior limb internal capsule lentiform nuclei infarct, age indeterminate lacunar infarcts, and left lateral ventricle frontal horn ex vacuo enlargement.    Nonspecific slightly more pronounced white matter decreased attenuation, left frontal and parietal lobes, new ischemia not excluded, DWI MR more sensitive for new ischemia. No new hemorrhage or midline shift. Basal cisterns remain patent.    Stenosis proximal left M1 segment, multifocal stenosis, distal left M1, M2 and M3 distal branches slightly decreased in size and number.    Patent extracranial vessels, no hemodynamically significant stenosis at ICA origins by NASCET criteria.    < end of copied text >    < from: CT Angio Head w/ IV Cont (04.08.20 @ 15:13) >  IMPRESSION:    Redemonstration, mild volume loss, microvascular disease, remote remote left caudate head and left anterior limb internal capsule lentiform nuclei infarct, age indeterminate lacunar infarcts, and left lateral ventricle frontal horn ex vacuo enlargement.    Nonspecific slightly more pronounced white matter decreased attenuation, left frontal and parietal lobes, new ischemia not excluded, DWI MR more sensitive for new ischemia. No new hemorrhage or midline shift. Basal cisterns remain patent.    Stenosis proximal left M1 segment, multifocal stenosis, distal left M1, M2 and M3 distal branches slightly decreased in size and number.    Patent extracranial vessels, no hemodynamically significant stenosis at ICA origins by NASCET criteria.      < end of copied text >    Care Discussed with Consultants/Other Providers:

## 2020-04-09 NOTE — CONSULT NOTE ADULT - ASSESSMENT
53 yo female with TIA X2 in left hemisphere MCA distribution. R/O left hemisphere small infarct. Left MCA branch stenoses on CTA, old small infarcts and possible acute left brain infarct on CT scan.    REC:  consider transfer for left MCA severe stenosis, add Plavix 75 mg Q day, increase atorvastatin to 80 mg Q HS, BP control, telemetry, cardiac consult. 53 yo female with TIA X2 in left hemisphere MCA distribution. R/O left hemisphere small infarct. Left MCA branch stenoses on CTA, old small infarcts and possible acute left brain infarct on CT scan.    REC:  consider transfer for left MCA severe stenosis, add Plavix 75 mg Q day, increase atorvastatin to 80 mg Q HS, BP control, telemetry, cardiac consult.    Note:  As discussed with Dr. Bella Hospitalist no need to transfer to [facility] as discussed with vascular neurologist on call.  No intervention indicated at this time for the left MCA stenosis noted on CTA.

## 2020-04-10 ENCOUNTER — APPOINTMENT (OUTPATIENT)
Dept: MRI IMAGING | Facility: HOSPITAL | Age: 55
End: 2020-04-10

## 2020-04-10 ENCOUNTER — TRANSCRIPTION ENCOUNTER (OUTPATIENT)
Age: 55
End: 2020-04-10

## 2020-04-10 VITALS
DIASTOLIC BLOOD PRESSURE: 92 MMHG | TEMPERATURE: 98 F | HEART RATE: 56 BPM | RESPIRATION RATE: 16 BRPM | SYSTOLIC BLOOD PRESSURE: 178 MMHG | OXYGEN SATURATION: 100 %

## 2020-04-10 PROCEDURE — 85610 PROTHROMBIN TIME: CPT

## 2020-04-10 PROCEDURE — 71045 X-RAY EXAM CHEST 1 VIEW: CPT

## 2020-04-10 PROCEDURE — 36415 COLL VENOUS BLD VENIPUNCTURE: CPT

## 2020-04-10 PROCEDURE — 84484 ASSAY OF TROPONIN QUANT: CPT

## 2020-04-10 PROCEDURE — 70496 CT ANGIOGRAPHY HEAD: CPT

## 2020-04-10 PROCEDURE — 80053 COMPREHEN METABOLIC PANEL: CPT

## 2020-04-10 PROCEDURE — 93306 TTE W/DOPPLER COMPLETE: CPT

## 2020-04-10 PROCEDURE — 80061 LIPID PANEL: CPT

## 2020-04-10 PROCEDURE — 70498 CT ANGIOGRAPHY NECK: CPT

## 2020-04-10 PROCEDURE — 87635 SARS-COV-2 COVID-19 AMP PRB: CPT

## 2020-04-10 PROCEDURE — 99285 EMERGENCY DEPT VISIT HI MDM: CPT | Mod: 25

## 2020-04-10 PROCEDURE — 83036 HEMOGLOBIN GLYCOSYLATED A1C: CPT

## 2020-04-10 PROCEDURE — 97161 PT EVAL LOW COMPLEX 20 MIN: CPT

## 2020-04-10 PROCEDURE — 85027 COMPLETE CBC AUTOMATED: CPT

## 2020-04-10 PROCEDURE — 85730 THROMBOPLASTIN TIME PARTIAL: CPT

## 2020-04-10 PROCEDURE — 36000 PLACE NEEDLE IN VEIN: CPT

## 2020-04-10 PROCEDURE — 93005 ELECTROCARDIOGRAM TRACING: CPT

## 2020-04-10 PROCEDURE — 70450 CT HEAD/BRAIN W/O DYE: CPT

## 2020-04-10 PROCEDURE — 99239 HOSP IP/OBS DSCHRG MGMT >30: CPT

## 2020-04-10 PROCEDURE — 82962 GLUCOSE BLOOD TEST: CPT

## 2020-04-10 RX ORDER — LOSARTAN POTASSIUM 100 MG/1
1 TABLET, FILM COATED ORAL
Qty: 0 | Refills: 0 | DISCHARGE

## 2020-04-10 RX ORDER — ASPIRIN/CALCIUM CARB/MAGNESIUM 324 MG
1 TABLET ORAL
Qty: 0 | Refills: 0 | DISCHARGE
Start: 2020-04-10

## 2020-04-10 RX ORDER — ATORVASTATIN CALCIUM 80 MG/1
1 TABLET, FILM COATED ORAL
Qty: 30 | Refills: 0
Start: 2020-04-10

## 2020-04-10 RX ORDER — CHLORTHALIDONE 50 MG
1 TABLET ORAL
Qty: 0 | Refills: 0 | DISCHARGE

## 2020-04-10 RX ORDER — CLOPIDOGREL BISULFATE 75 MG/1
1 TABLET, FILM COATED ORAL
Qty: 0 | Refills: 0 | DISCHARGE
Start: 2020-04-10

## 2020-04-10 RX ORDER — CLOPIDOGREL BISULFATE 75 MG/1
1 TABLET, FILM COATED ORAL
Qty: 30 | Refills: 0
Start: 2020-04-10

## 2020-04-10 RX ORDER — ATORVASTATIN CALCIUM 80 MG/1
1 TABLET, FILM COATED ORAL
Qty: 0 | Refills: 0 | DISCHARGE
Start: 2020-04-10

## 2020-04-10 RX ORDER — MIDODRINE HYDROCHLORIDE 2.5 MG/1
5 TABLET ORAL EVERY 8 HOURS
Refills: 0 | Status: DISCONTINUED | OUTPATIENT
Start: 2020-04-10 | End: 2020-04-10

## 2020-04-10 RX ORDER — ASPIRIN/CALCIUM CARB/MAGNESIUM 324 MG
1 TABLET ORAL
Qty: 30 | Refills: 0
Start: 2020-04-10

## 2020-04-10 RX ADMIN — Medication 81 MILLIGRAM(S): at 12:36

## 2020-04-10 RX ADMIN — ENOXAPARIN SODIUM 40 MILLIGRAM(S): 100 INJECTION SUBCUTANEOUS at 12:37

## 2020-04-10 RX ADMIN — CLOPIDOGREL BISULFATE 75 MILLIGRAM(S): 75 TABLET, FILM COATED ORAL at 12:37

## 2020-04-10 NOTE — DISCHARGE NOTE PROVIDER - NSDCCPCAREPLAN_GEN_ALL_CORE_FT
PRINCIPAL DISCHARGE DIAGNOSIS  Diagnosis: Cerebrovascular accident (CVA), unspecified mechanism  Assessment and Plan of Treatment: PRINCIPAL DISCHARGE DIAGNOSIS  Diagnosis: Cerebrovascular accident (CVA), unspecified mechanism  Assessment and Plan of Treatment: please take medications as prescribed and follow up with neurology for oputpt MRI and further work up.      SECONDARY DISCHARGE DIAGNOSES  Diagnosis: Hypertension  Assessment and Plan of Treatment: meds currently hold to allow permissive hypertension. please monitor your BP on outpt pmd visit and resume if SBP >180

## 2020-04-10 NOTE — PROGRESS NOTE ADULT - SUBJECTIVE AND OBJECTIVE BOX
Patient is a 54y old  Female who presents with a chief complaint of Rt sided weakness (10 Apr 2020 11:33)      Patient seen and examined at bedside by Dr. Bella; clinically improved; BP stable on Midodrine(permissive HTN); as per Neuro (Dr. Miller) MRI to be deferred to out-pt.    ALLERGIES:  amlodipine (Unknown)  Nyquil Cold Medicine (Unknown)  NyQuil D (Unknown)    MEDICATIONS  (STANDING):  aspirin  chewable 81 milliGRAM(s) Oral daily  atorvastatin 80 milliGRAM(s) Oral at bedtime  clopidogrel Tablet 75 milliGRAM(s) Oral daily  enoxaparin Injectable 40 milliGRAM(s) SubCutaneous daily  sodium chloride 0.9%. 1000 milliLiter(s) (75 mL/Hr) IV Continuous <Continuous>    MEDICATIONS  (PRN):    Vital Signs Last 24 Hrs  T(F): 97.7 (10 Apr 2020 10:04), Max: 99.1 (09 Apr 2020 18:21)  HR: 56 (10 Apr 2020 10:04) (56 - 77)  BP: 178/92 (10 Apr 2020 10:04) (139/70 - 178/92)  RR: 16 (10 Apr 2020 10:04) (15 - 16)  SpO2: 100% (10 Apr 2020 10:04) (95% - 100%)  I&O's Summary      PHYSICAL EXAM: /78, p 62, RR 16, afebrile O2 sats 100% on RA  General: NAD, A/O x 3  ENT: MMM  Neck: Supple, No JVD  Lungs: Clear to auscultation bilaterally  Cardio: RRR, S1/S2, No murmurs  Abdomen: Soft, Nontender, Nondistended; Bowel sounds present  Extremities: No calf tenderness, No pitting edema    LABS:                        14.3   6.69  )-----------( 331      ( 09 Apr 2020 07:31 )             42.9       04-09    140  |  102  |  15  ----------------------------<  113  3.7   |  29  |  0.70    Ca    8.9      09 Apr 2020 07:31    TPro  7.6  /  Alb  3.4  /  TBili  0.7  /  DBili  x   /  AST  14  /  ALT  29  /  AlkPhos  106  04-09     eGFR if Non African American: 100 mL/min/1.73M2 (04-09-20 @ 07:31)  eGFR if African American: 116 mL/min/1.73M2 (04-09-20 @ 07:31)    PT/INR - ( 08 Apr 2020 14:56 )   PT: 11.7 sec;   INR: 1.04 ratio         PTT - ( 08 Apr 2020 14:56 )  PTT:30.7 sec     CARDIAC MARKERS ( 08 Apr 2020 14:56 )  <.017 ng/mL / x     / x     / x     / x          04-09 Chol 207 mg/dL  mg/dL HDL 23 mg/dL Trig 133 mg/dL      04-09 [id number] 6.2    RADIOLOGY & ADDITIONAL TESTS:    Care Discussed with Consultants/Other Providers:

## 2020-04-10 NOTE — PROGRESS NOTE ADULT - SUBJECTIVE AND OBJECTIVE BOX
Patient is a 54y old  Female who presents with a chief complaint of Rt sided weakness (10 Apr 2020 11:33)    Interval Hx  Patient seen and examined at bedside. No overnight events reported.     ALLERGIES:  amlodipine (Unknown)  Nyquil Cold Medicine (Unknown)  NyQuil D (Unknown)    MEDICATIONS  (STANDING):  aspirin  chewable 81 milliGRAM(s) Oral daily  atorvastatin 80 milliGRAM(s) Oral at bedtime  clopidogrel Tablet 75 milliGRAM(s) Oral daily  enoxaparin Injectable 40 milliGRAM(s) SubCutaneous daily  sodium chloride 0.9%. 1000 milliLiter(s) (75 mL/Hr) IV Continuous <Continuous>    MEDICATIONS  (PRN):    Vital Signs Last 24 Hrs  T(F): 97.7 (10 Apr 2020 10:04), Max: 99.1 (09 Apr 2020 18:21)  HR: 56 (10 Apr 2020 10:04) (56 - 77)  BP: 178/92 (10 Apr 2020 10:04) (139/70 - 178/92)  RR: 16 (10 Apr 2020 10:04) (15 - 16)  SpO2: 100% (10 Apr 2020 10:04) (95% - 100%)  I&O's Summary    PHYSICAL EXAM:  General: NAD, A/O x 3  ENT: MMM, no thrush  Neck: Supple, No JVD  Lungs: Clear to auscultation bilaterally, good air entry, non-labored breathing  Cardio: RRR, S1/S2, No murmur  Abdomen: Soft, Nontender, Nondistended; Bowel sounds present  Extremities: No calf tenderness, No pitting edema    LABS:                        14.3   6.69  )-----------( 331      ( 09 Apr 2020 07:31 )             42.9     04-09    140  |  102  |  15  ----------------------------<  113  3.7   |  29  |  0.70    Ca    8.9      09 Apr 2020 07:31    TPro  7.6  /  Alb  3.4  /  TBili  0.7  /  DBili  x   /  AST  14  /  ALT  29  /  AlkPhos  106  04-09        eGFR if Non African American: 100 mL/min/1.73M2 (04-09-20 @ 07:31)  eGFR if African American: 116 mL/min/1.73M2 (04-09-20 @ 07:31)    PT/INR - ( 08 Apr 2020 14:56 )   PT: 11.7 sec;   INR: 1.04 ratio         PTT - ( 08 Apr 2020 14:56 )  PTT:30.7 sec      CARDIAC MARKERS ( 08 Apr 2020 14:56 )  <.017 ng/mL / x     / x     / x     / x          04-09 Chol 207 mg/dL  mg/dL HDL 23 mg/dL Trig 133 mg/dL                04-09 [id number] 6.2        RADIOLOGY & ADDITIONAL TESTS:    < from: CT Head No Cont (04.08.20 @ 15:13) >  Redemonstration, mild volume loss, microvascular disease, remote remote left caudate head and left anterior limb internal capsule lentiform nuclei infarct, age indeterminate lacunar infarcts, and left lateral ventricle frontal horn ex vacuo enlargement.    Nonspecific slightly more pronounced white matter decreased attenuation, left frontal and parietal lobes, new ischemia not excluded, DWI MR more sensitive for new ischemia. No new hemorrhage or midline shift. Basal cisterns remain patent.    Stenosis proximal left M1 segment, multifocal stenosis, distal left M1, M2 and M3 distal branches slightly decreased in size and number.    Patent extracranial vessels, no hemodynamically significant stenosis at ICA origins by NASCET criteria.    Findings discussed Dr. Reynolds in the Emergency Department  at immediate time of review on 4/8/20 at 3:48 pm.           < end of copied text >    < from: CT Angio Neck w/ IV Cont (04.08.20 @ 15:13) >  Redemonstration, mild volume loss, microvascular disease, remote remote left caudate head and left anterior limb internal capsule lentiform nuclei infarct, age indeterminate lacunar infarcts, and left lateral ventricle frontal horn ex vacuo enlargement.    Nonspecific slightly more pronounced white matter decreased attenuation, left frontal and parietal lobes, new ischemia not excluded, DWI MR more sensitive for new ischemia. No new hemorrhage or midline shift. Basal cisterns remain patent.    Stenosis proximal left M1 segment, multifocal stenosis, distal left M1, M2 and M3 distal branches slightly decreased in size and number.    Patent extracranial vessels, no hemodynamically significant stenosis at ICA origins by NASCET criteria.    Findings discussed Dr. Reynolds in the Emergency Department  at immediate time of review on 4/8/20 at 3:48 pm.       < end of copied text >    < from: TTE Echo Complete w/o contrast w/ Doppler (04.09.20 @ 13:41) >  1. Left ventricular ejection fraction, by visual estimation, is 60 to 65%.   2. Normal global left ventricular systolic function.   3. Spectral Doppler shows impaired relaxation pattern of left ventricular myocardial filling (Grade I diastolic dysfunction).   4. Thinning of the interatrial septum.   5. Mild thickening and calcification of the anterior and posterior mitral valve leaflets.   6. Sclerotic aortic valve with normal opening.   7. Pulmonary hypertension is absent.   8. LA volume Index is 14.1 ml/m² ml/m2.    < end of copied text >      Care Discussed with Consultants/Other Providers:

## 2020-04-10 NOTE — DISCHARGE NOTE PROVIDER - PROVIDER TOKENS
PROVIDER:[TOKEN:[[id number]],FOLLOWUP:[1-3 days]],PROVIDER:[TOKEN:[[id number]],FOLLOWUP:[1-3 days]],PROVIDER:[TOKEN:[[id number]],FOLLOWUP:[2 weeks]]

## 2020-04-10 NOTE — DISCHARGE NOTE PROVIDER - NSDCACTIVITY_GEN_ALL_CORE
Showering allowed/Driving allowed/Do not drive or operate machinery/Do not make important decisions/Stairs allowed/Walking - Indoors allowed/No heavy lifting/straining/Walking - Outdoors allowed

## 2020-04-10 NOTE — DISCHARGE NOTE PROVIDER - HOSPITAL COURSE
53y/o female w/ HTN and h/o migraines self-presented to ED 4/08/20 complaining of Right sided weakness and difficulty walking. States that her hand and her leg felt "heavy". Felt like she couldn't get her words and thoughts out when her body felt heavy. Unsure of onset of symptoms so TpA not administered; head CT scan negative for hemmorhage, old left infarct showed remote left caudate head and left anterior limb internal capsule lentiform nuclei infarct, age indeterminate lacunar infarcts, and left lateral ventricle frontal horn ex vacuo enlargemen, nonspecific slightly more pronounced white matter decreased attenuation, left frontal and parietal lobes; CTA neck revealed left MCA stenosis; Neurology consulted recommended ASA, Plavix and statin as well as out-pt brain MRI; pt's home antihypertensives discontinued to allow permissive hypertension and Midodrine was added to maintain BP syst above 160; Cardiology consulted recommending out-pt follow-up for ELKE to r/o PFO and possible loop recorder; COVID negative; VSS, afebrile; pt without swallowing difficulties, clinically improved and medically stable for discharge; she will f/u with Dr. Nassar ([facility]) on Mon 4/13/20, Neurology (Dr. Miller) on Mon 4/3/20 for possible out-pt MRI and with Cardiology ( et al) in 1-2 weeks; Discharge meds include ASA, Plavix and Lipitor; she has been advised to discontinue all antihypertensives; midodrine is discontinued 53y/o female w/ HTN and h/o migraines self-presented to ED 4/08/20 complaining of Right sided weakness and difficulty walking. States that her hand and her leg felt "heavy". Felt like she couldn't get her words and thoughts out when her body felt heavy. Unsure of onset of symptoms so TpA not administered; head CT scan negative for hemorrhage and showed remote left caudate head and left anterior limb internal capsule lentiform nuclei infarct, age indeterminate lacunar infarcts, and left lateral ventricle frontal horn ex vacuo enlargemen, nonspecific slightly more pronounced white matter decreased attenuation, left frontal and parietal lobes; CTA neck revealed left MCA stenosis; Neurology consulted recommended ASA, Plavix and statin as well as out-pt brain MRI; pt's home antihypertensives discontinued to allow permissive hypertension and Midodrine was added to maintain BP syst above 160; Cardiology consulted recommending out-pt follow-up for ELKE to r/o PFO and possible loop recorder; COVID negative; VSS, afebrile; pt without swallowing difficulties, clinically improved and medically stable for discharge; she will f/u with Dr. Nassar ([facility]) on Mon 4/13/20, Neurology (Dr. Miller) on Mon 4/3/20 for possible out-pt MRI and with Cardiology ( et al) in 1-2 weeks; Discharge meds include ASA, Plavix and Lipitor; she has been advised to discontinue all antihypertensives; midodrine is discontinued for d/c home.

## 2020-04-10 NOTE — DISCHARGE NOTE PROVIDER - NSDCMRMEDTOKEN_GEN_ALL_CORE_FT
ascorbic acid 500 mg oral tablet, chewable: 1 tab(s) orally once a day  aspirin 81 mg oral tablet, chewable: 1 tab(s) orally once a day  aspirin 81 mg oral tablet, chewable: 1 tab(s) orally once a day MDD:1  atorvastatin 80 mg oral tablet: 1 tab(s) orally once a day (at bedtime) MDD:1  atorvastatin 80 mg oral tablet: 1 tab(s) orally once a day (at bedtime)  clopidogrel 75 mg oral tablet: 1 tab(s) orally once a day MDD:1  meclizine 25 mg oral tablet: 1 tab(s) orally 3 times a day ascorbic acid 500 mg oral tablet, chewable: 1 tab(s) orally once a day  aspirin 81 mg oral tablet, chewable: 1 tab(s) orally once a day MDD:1  atorvastatin 80 mg oral tablet: 1 tab(s) orally once a day (at bedtime) MDD:1  clopidogrel 75 mg oral tablet: 1 tab(s) orally once a day MDD:1  meclizine 25 mg oral tablet: 1 tab(s) orally 3 times a day

## 2020-04-10 NOTE — PROGRESS NOTE ADULT - ASSESSMENT
54 year old F w/ HTN and h/o migraines presenting to ED complaining of Right sided weakness and difficulty walking since this morning.    #Rt. sided weakness R/O CVA vs TIA, symptoms Improving   COVID negative  CTH, CTA findings noted : results as above  TPA not given as it is unclear when symptoms began  cont ASA. Plavix and statin  Neuro f/u as out-pt MRI 4/13/20  Family Practice for BP check off meds 4/13/20  Cardiology as out-pt 2d echo in 1-2 weeks  Boyfriend notified (attempted to call son x 2, no answer)    #HTN  holding losartan/chlorthalidon in view of allowing permissive hypertension for now  MOnitor BP, resume if BP >200/110    #DVt ppx  Dispo : home when cleared by NEuro and if no further w/u needed

## 2020-04-10 NOTE — PROGRESS NOTE ADULT - ASSESSMENT
54 year old F w/ HTN and h/o migraines presenting to ED complaining of Right sided weakness and difficulty walking since this morning.    #Rt. sided weakness R/O CVA vs TIA, symptoms resolved  COVID negative  CTH, CTA findings noted : results as above   Neuro eval appreciated   cont ASA and statin, plavix added  case d/w Dr. Miller today--- pt can have MRI as outpt , apt scheduled on monday .   further plan as per Neuro on outpt follow up  appreciate cards eval : outpt follow up for holter monitor  appreciate PT eval recommended home    #HTN  holding losartan/chlorthalidon in view of allowing permissive hypertension for now  Monitor BP, goal sbp 160-180 for now as recommended by neuro    #DVt ppx  Dispo : Home today  hold antihypertensive upon discharge for few days  to allow permissive hypertension for now. Pt to see PMD on monday and resume antihypertensive . case discussed with patient regarding meds hold and she ackowledges understanding changes made. Apt made with PMD/Neuro as outpt. pt is medically stable to be discharged today.

## 2020-04-10 NOTE — DISCHARGE NOTE NURSING/CASE MANAGEMENT/SOCIAL WORK - PATIENT PORTAL LINK FT
You can access the FollowMyHealth Patient Portal offered by [facility] by registering at the following website: [email or web address]. By joining [facility]’s FollowMyHealth portal, you will also be able to view your health information using other applications (apps) compatible with our system.

## 2020-04-10 NOTE — DISCHARGE NOTE NURSING/CASE MANAGEMENT/SOCIAL WORK - NSDCPEPTSTRK_GEN_ALL_CORE
Need for follow up after discharge/Prescribed medications/Risk factors for stroke/Stroke education booklet/Stroke warning signs and symptoms/Signs and symptoms of stroke/Stroke support groups for patients, families, and friends/Call 911 for stroke

## 2020-04-10 NOTE — DISCHARGE NOTE PROVIDER - CARE PROVIDER_API CALL
Wally Miller)  Neurology  [address], [address]  [place], [place] [id number]  Phone: [phone]  Fax: [phone]  Follow Up Time: 1-3 days    Kavon Nassar)  [facility] Medicine  [address] [place] [address]  Phone: [phone]  Fax: [phone]  Follow Up Time: 1-3 days    Daljit Hale)  Cardiology; Internal Medicine  [address], [address]  [place] [address]  Phone: [phone]  Fax: [phone]  Follow Up Time: 2 weeks

## 2020-04-10 NOTE — DISCHARGE NOTE PROVIDER - NSDCFUSCHEDAPPT_GEN_ALL_CORE_FT
JEFFREY FINN ; 04/30/2020 ; NPP Neurology [address] JEFFREY FINN ; 04/13/2020 ; [facility]  JEFFREY FINN ; 04/30/2020 ; NPP Neurology [address]

## 2020-04-10 NOTE — DISCHARGE NOTE PROVIDER - CARE PROVIDERS DIRECT ADDRESSES
,DirectAddress_Unknown,[email or web address]

## 2020-04-13 ENCOUNTER — OUTPATIENT (OUTPATIENT)
Dept: OUTPATIENT SERVICES | Facility: HOSPITAL | Age: 55
LOS: 1 days | End: 2020-04-13
Payer: COMMERCIAL

## 2020-04-13 ENCOUNTER — APPOINTMENT (OUTPATIENT)
Dept: FAMILY MEDICINE | Facility: HOSPITAL | Age: 55
End: 2020-04-13

## 2020-04-13 DIAGNOSIS — I66.02 OCCLUSION AND STENOSIS OF LEFT MIDDLE CEREBRAL ARTERY: ICD-10-CM

## 2020-04-13 DIAGNOSIS — Z00.00 ENCOUNTER FOR GENERAL ADULT MEDICAL EXAMINATION WITHOUT ABNORMAL FINDINGS: ICD-10-CM

## 2020-04-13 DIAGNOSIS — Z90.49 ACQUIRED ABSENCE OF OTHER SPECIFIED PARTS OF DIGESTIVE TRACT: Chronic | ICD-10-CM

## 2020-04-13 DIAGNOSIS — I63.81 OTHER CEREBRAL INFARCTION DUE TO OCCLUSION OR STENOSIS OF SMALL ARTERY: ICD-10-CM

## 2020-04-13 DIAGNOSIS — Z98.891 HISTORY OF UTERINE SCAR FROM PREVIOUS SURGERY: Chronic | ICD-10-CM

## 2020-04-13 PROCEDURE — G0463: CPT

## 2020-04-13 NOTE — PHYSICAL EXAM
[No Acute Distress] : no acute distress [Well-Appearing] : well-appearing [Normal Sclera/Conjunctiva] : normal sclera/conjunctiva [PERRL] : pupils equal round and reactive to light [EOMI] : extraocular movements intact [No Respiratory Distress] : no respiratory distress  [Clear to Auscultation] : lungs were clear to auscultation bilaterally [Normal Rate] : normal rate  [Regular Rhythm] : with a regular rhythm [Normal S1, S2] : normal S1 and S2 [No Murmur] : no murmur heard [Coordination Grossly Intact] : coordination grossly intact [No Focal Deficits] : no focal deficits [Normal Gait] : normal gait

## 2020-04-14 DIAGNOSIS — I63.81 OTHER CEREBRAL INFARCTION DUE TO OCCLUSION OR STENOSIS OF SMALL ARTERY: ICD-10-CM

## 2020-04-14 DIAGNOSIS — I66.02 OCCLUSION AND STENOSIS OF LEFT MIDDLE CEREBRAL ARTERY: ICD-10-CM

## 2020-04-14 DIAGNOSIS — R53.1 WEAKNESS: ICD-10-CM

## 2020-04-14 DIAGNOSIS — I10 ESSENTIAL (PRIMARY) HYPERTENSION: ICD-10-CM

## 2020-04-14 NOTE — ASSESSMENT
[FreeTextEntry1] : Patient nish 55 y/o with PMHx of HTN, migraines TIA presenting to clinic for recent hospitalization( 4/8) for r/o TIA/ stroke found to have age indetermine infarcts on CTH and Left MCA stenosis:

## 2020-04-14 NOTE — HISTORY OF PRESENT ILLNESS
[FreeTextEntry1] : Follow up [de-identified] : Patient is a 53 y/o female with PMHx of HTN, migraines presents to clinic today for follow up of recent hospitalization at [facility] on 4/8 for right sided weakness and difficulty walking, admitted for rule out of TIA/ Stroke. CTH obtained showing age indeterminate infarcts. Patient started on ASA, statin, plavix and antihypertensives were held. Discharged on 4/10\par \par Today she is feeling well. Denies any neurological symptoms: no speech, gait, motor weakness. States she was told hold off on taking BP medications. States she is taking two new medications but unsure of which medications they are.\par \par Spoke with patient pharmacist over phone: states patient is taking statin and plavix but not aspirin because it is OTC med.

## 2020-04-14 NOTE — REVIEW OF SYSTEMS
[Vision Problems] : no vision problems [Chest Pain] : no chest pain [Palpitations] : no palpitations [Headache] : no headache [Unsteady Walking] : no ataxia

## 2020-04-14 NOTE — PLAN
[FreeTextEntry1] : \par \par #TIA\par - Hospitalized from 4/8-4/10 at [facility], CTH findings reviewed\par - No neurological symptoms present at todays visit\par - Started on ASA, Plavix, atorvastatin during hospitalization- patient currently only taking plavix and atorvastatin, prescription for ASA resent to pharmacy\par - Will need to continue medications, follow up with Dr. Hale, Neurology for possible MRI\par - Follow up with Cardiology: Dr. Miller for possible ELKE for r/o PFO and loop recorder\par \par #Left MCA stenosis\par - Found on CTA during hospitalization\par - BP meds held: HCTZ and losartan\par - BP today 150/90, will continue to hold until follow up Neuro/ Cardiology\par \par Patient to follow up with clinic over the phone in 2 weeks to determine resumption of BP meds\par \par Case discussed with Dr. Blackwood\par \par \par

## 2020-04-15 ENCOUNTER — APPOINTMENT (OUTPATIENT)
Dept: CARDIOLOGY | Facility: CLINIC | Age: 55
End: 2020-04-15
Payer: COMMERCIAL

## 2020-04-15 PROCEDURE — 93228 REMOTE 30 DAY ECG REV/REPORT: CPT

## 2020-04-20 ENCOUNTER — APPOINTMENT (OUTPATIENT)
Dept: MRI IMAGING | Facility: HOSPITAL | Age: 55
End: 2020-04-20
Payer: COMMERCIAL

## 2020-04-20 ENCOUNTER — OUTPATIENT (OUTPATIENT)
Dept: OUTPATIENT SERVICES | Facility: HOSPITAL | Age: 55
LOS: 1 days | End: 2020-04-20
Payer: COMMERCIAL

## 2020-04-20 DIAGNOSIS — Z98.891 HISTORY OF UTERINE SCAR FROM PREVIOUS SURGERY: Chronic | ICD-10-CM

## 2020-04-20 DIAGNOSIS — Z90.49 ACQUIRED ABSENCE OF OTHER SPECIFIED PARTS OF DIGESTIVE TRACT: Chronic | ICD-10-CM

## 2020-04-20 DIAGNOSIS — I63.9 CEREBRAL INFARCTION, UNSPECIFIED: ICD-10-CM

## 2020-04-20 PROCEDURE — 70551 MRI BRAIN STEM W/O DYE: CPT

## 2020-04-20 PROCEDURE — 70551 MRI BRAIN STEM W/O DYE: CPT | Mod: 26

## 2020-04-29 ENCOUNTER — APPOINTMENT (OUTPATIENT)
Dept: FAMILY MEDICINE | Facility: HOSPITAL | Age: 55
End: 2020-04-29

## 2020-04-29 ENCOUNTER — OUTPATIENT (OUTPATIENT)
Dept: OUTPATIENT SERVICES | Facility: HOSPITAL | Age: 55
LOS: 1 days | End: 2020-04-29
Payer: COMMERCIAL

## 2020-04-29 VITALS
OXYGEN SATURATION: 96 % | SYSTOLIC BLOOD PRESSURE: 169 MMHG | BODY MASS INDEX: 28.84 KG/M2 | TEMPERATURE: 98.4 F | HEART RATE: 71 BPM | DIASTOLIC BLOOD PRESSURE: 89 MMHG | WEIGHT: 168 LBS | RESPIRATION RATE: 16 BRPM

## 2020-04-29 DIAGNOSIS — R04.2 HEMOPTYSIS: ICD-10-CM

## 2020-04-29 DIAGNOSIS — Z87.898 PERSONAL HISTORY OF OTHER SPECIFIED CONDITIONS: ICD-10-CM

## 2020-04-29 DIAGNOSIS — R53.1 WEAKNESS: ICD-10-CM

## 2020-04-29 DIAGNOSIS — I63.9 CEREBRAL INFARCTION, UNSPECIFIED: ICD-10-CM

## 2020-04-29 DIAGNOSIS — I16.1 HYPERTENSIVE EMERGENCY: ICD-10-CM

## 2020-04-29 DIAGNOSIS — I10 ESSENTIAL (PRIMARY) HYPERTENSION: ICD-10-CM

## 2020-04-29 DIAGNOSIS — Z00.00 ENCOUNTER FOR GENERAL ADULT MEDICAL EXAMINATION WITHOUT ABNORMAL FINDINGS: ICD-10-CM

## 2020-04-29 DIAGNOSIS — Z92.29 PERSONAL HISTORY OF OTHER DRUG THERAPY: ICD-10-CM

## 2020-04-29 DIAGNOSIS — R45.86 EMOTIONAL LABILITY: ICD-10-CM

## 2020-04-29 DIAGNOSIS — Z90.49 ACQUIRED ABSENCE OF OTHER SPECIFIED PARTS OF DIGESTIVE TRACT: Chronic | ICD-10-CM

## 2020-04-29 DIAGNOSIS — Z98.891 HISTORY OF UTERINE SCAR FROM PREVIOUS SURGERY: Chronic | ICD-10-CM

## 2020-04-29 PROCEDURE — G0463: CPT

## 2020-04-29 NOTE — PHYSICAL EXAM
[No Acute Distress] : no acute distress [Well Nourished] : well nourished [PERRL] : pupils equal round and reactive to light [Normal Sclera/Conjunctiva] : normal sclera/conjunctiva [Well-Appearing] : well-appearing [EOMI] : extraocular movements intact [No Respiratory Distress] : no respiratory distress  [Clear to Auscultation] : lungs were clear to auscultation bilaterally [Normal S1, S2] : normal S1 and S2 [Normal Rate] : normal rate  [Regular Rhythm] : with a regular rhythm [No Murmur] : no murmur heard [No Edema] : there was no peripheral edema [No Spinal Tenderness] : no spinal tenderness [No Joint Swelling] : no joint swelling [Grossly Normal Strength/Tone] : grossly normal strength/tone [Normal Gait] : normal gait [No Focal Deficits] : no focal deficits [Coordination Grossly Intact] : coordination grossly intact [Normal Oropharynx] : the oropharynx was normal

## 2020-04-29 NOTE — HISTORY OF PRESENT ILLNESS
[de-identified] : 53 yo female with h/o HTN, migraines, recently admitted and discharged from [facility] for R-sided weakness, with concern for TIA, CTH however at the time with indeterminate infarcts. left MCA stenosis also found. Pt was discharged with outpt f/u with Neurology (for possible MRI) and Cardiology (for possible ELKE to r/o PFO). Last seen and evaluated in clinic on 4/13. Pt is being seen in clinic today for follow up visit.\par \par Pt reports seeing the Neurologist on 4/20, an MRI brain was obtained, which showed an old left basal ganglia infarct.\par Pt also reports seeing Cardiology, Dr. Boyce, on 4/15. A loop recorder was placed, with pt advised to follow up in 1 month for re-evaluation. Pt calls his office everyday to give record of loop recordings.\par \par Pt on today's visit reports still feeling weak. She reports generalized weakness in the body, able to walk without issues now. Reports having tactile fever 1 week ago, with diaphoresis, however did not take an actual temp. Pt reports taking a shower with resolution of her sxs.\par In addition, she also reports having blood-tinged sputum in the mornings, has been occurring for the past 1-2 weeks. Denies associated cough, night fevers, or chills. No h/i smoking.

## 2020-04-29 NOTE — END OF VISIT
[] : Resident [FreeTextEntry3] : Dr. Sharma called cardiology Dr. Boyce whom advised her to call Neurology about Pt's hemoptysis which she did and left a message and she will update the chart upon receiving the answer

## 2020-04-30 DIAGNOSIS — R53.1 WEAKNESS: ICD-10-CM

## 2020-05-06 ENCOUNTER — APPOINTMENT (OUTPATIENT)
Dept: FAMILY MEDICINE | Facility: HOSPITAL | Age: 55
End: 2020-05-06

## 2020-05-06 ENCOUNTER — OUTPATIENT (OUTPATIENT)
Dept: OUTPATIENT SERVICES | Facility: HOSPITAL | Age: 55
LOS: 1 days | End: 2020-05-06
Payer: COMMERCIAL

## 2020-05-06 VITALS
RESPIRATION RATE: 16 BRPM | TEMPERATURE: 98.3 F | HEART RATE: 81 BPM | SYSTOLIC BLOOD PRESSURE: 177 MMHG | DIASTOLIC BLOOD PRESSURE: 105 MMHG | OXYGEN SATURATION: 96 % | BODY MASS INDEX: 28.49 KG/M2 | WEIGHT: 166 LBS

## 2020-05-06 DIAGNOSIS — Z90.49 ACQUIRED ABSENCE OF OTHER SPECIFIED PARTS OF DIGESTIVE TRACT: Chronic | ICD-10-CM

## 2020-05-06 DIAGNOSIS — Z00.00 ENCOUNTER FOR GENERAL ADULT MEDICAL EXAMINATION WITHOUT ABNORMAL FINDINGS: ICD-10-CM

## 2020-05-06 DIAGNOSIS — Z98.891 HISTORY OF UTERINE SCAR FROM PREVIOUS SURGERY: Chronic | ICD-10-CM

## 2020-05-06 PROCEDURE — G0463: CPT

## 2020-05-07 DIAGNOSIS — G45.9 TRANSIENT CEREBRAL ISCHEMIC ATTACK, UNSPECIFIED: ICD-10-CM

## 2020-05-12 NOTE — PHYSICAL EXAM
[No Acute Distress] : no acute distress [Well Nourished] : well nourished [Well Developed] : well developed [Well-Appearing] : well-appearing [No Respiratory Distress] : no respiratory distress  [No Accessory Muscle Use] : no accessory muscle use [Clear to Auscultation] : lungs were clear to auscultation bilaterally [Normal Rate] : normal rate  [Regular Rhythm] : with a regular rhythm [Normal S1, S2] : normal S1 and S2 [Coordination Grossly Intact] : coordination grossly intact [No Focal Deficits] : no focal deficits [Normal Gait] : normal gait [Normal Affect] : the affect was normal [Alert and Oriented x3] : oriented to person, place, and time [de-identified] : 5/5 strength in upper and lower extremities; cranial nerves intact

## 2020-05-12 NOTE — ASSESSMENT
[FreeTextEntry1] : - As per discussion with Dr. Blackwood and inpatient documentation, permissive HTN being allowed and no anti-HTN medicines will be prescribed at this time. \par - She can continue Xarelto as no blatant signs and symptoms of bleeding are present. \par - Patient advised to follow up within 1 week.

## 2020-05-12 NOTE — HISTORY OF PRESENT ILLNESS
[de-identified] : 53 yo F with PMHx migraines and HTN here for follow up of possible TIA symptoms. She was seen on 4/29 post-discharge for TIA. CTH at the time showed indeterminate infarcts and left MCA stenosis. MRI brain done 4/20 showed an old left basal ganglia infarct. She denies any weakness or headaches at this visit. BP is elevated today again (also elevated at previous visit). Not put on anti-HTNs at last visit due to concern for worsening stroke symptoms. She has a neurology appointment scheduled in June 2020.\par \par When asked about hemoptysis, she states she never had a cough. However, she spits up some blood throughout the day, mostly before brushing her teeth. The blood she notices has decreased over the past few days.

## 2020-05-13 ENCOUNTER — APPOINTMENT (OUTPATIENT)
Dept: FAMILY MEDICINE | Facility: HOSPITAL | Age: 55
End: 2020-05-13

## 2020-06-02 ENCOUNTER — APPOINTMENT (OUTPATIENT)
Dept: NEUROLOGY | Facility: CLINIC | Age: 55
End: 2020-06-02
Payer: COMMERCIAL

## 2020-06-02 PROCEDURE — 99202 OFFICE O/P NEW SF 15 MIN: CPT | Mod: 95

## 2020-06-02 RX ORDER — ASPIRIN 81 MG/1
81 TABLET ORAL DAILY
Qty: 60 | Refills: 3 | Status: DISCONTINUED | COMMUNITY
Start: 2020-04-13 | End: 2020-06-02

## 2020-06-02 NOTE — REASON FOR VISIT
[Home] : at home, [unfilled] , at the time of the visit. [Medical Office: ([facility])___] : at the medical office located in  [Verbal consent obtained from patient] : the patient, [unfilled] [Initial Evaluation] : an initial evaluation

## 2020-06-02 NOTE — HISTORY OF PRESENT ILLNESS
[FreeTextEntry1] : 54-year-old, right-handed woman, history of hypertension, migraine headaches, presented to local hospital, for headaches, and transient right-sided heaviness, weakness, at CT angiogram of the brain and neck, MRI of brain. Demonstrated no acute stroke, an old subcortical stroke on the left , andin an and is a 3 and a and I will able to go to a muscles in theone who will do her own and will will be on the median and is an inguinal stenosis of the left M1 segment.\par since she was discharged to home, she had no further symptoms. Denies headaches, no dizzy spells, attention, vision, trouble speaking, both swallowing, no chest revision, no unilateral weakness or numbness of the extremities. Some difficulty walking related to arthritis of the knees.\par No recent falls or trauma. No episode of passing out or becoming unaware of her surroundings. She was running out of medication for blood pressure, is not taking any for the last couple weeks.

## 2020-06-02 NOTE — ASSESSMENT
[FreeTextEntry1] : 54-year-old womanwith a history of hypertension, hyperlipidemia,presented to [facility] with transient right-sided weakness. Symptoms not resolve, imaging revealed prior soft, acute, left subcortical stroke. Stenosis of the left M1 MCA segment.\par discussed the importance of blood pressure control, aim will be reduction of systolic blood pressure to less than 130.\par We'll reinstate; losartan 100 mg po QD\par chlorthalidone 50 mg po QD\par Continue atorvastatin 80 mg p.o. q.d.\par Continue Plavix 75 mg p.o. q.d.\par Advised to discontinue aspirin, because of increased incidence of bleeding episodes with combined antiplatelet use.\par Return to office, one month for examination.\par Advised to followup with PCP.

## 2020-06-02 NOTE — PHYSICAL EXAM
[General Appearance - Alert] : alert [General Appearance - In No Acute Distress] : in no acute distress [Oriented To Time, Place, And Person] : oriented to person, place, and time [Impaired Insight] : insight and judgment were intact [Affect] : the affect was normal [Fluency] : fluency intact [Past History] : adequate knowledge of personal past history [Comprehension] : comprehension intact [Cranial Nerves Optic (II)] : visual acuity intact bilaterally,  visual fields full to confrontation, pupils equal round and reactive to light [Cranial Nerves Oculomotor (III)] : extraocular motion intact [Cranial Nerves Facial (VII)] : face symmetrical [Cranial Nerves Vestibulocochlear (VIII)] : hearing was intact bilaterally [Cranial Nerves Accessory (XI - Cranial And Spinal)] : head turning and shoulder shrug symmetric [Cranial Nerves Hypoglossal (XII)] : there was no tongue deviation with protrusion [Involuntary Movements] : no involuntary movements were seen [Paresis Pronator Drift Right-Sided] : no pronator drift on the right [Motor Handedness Right-Handed] : the patient is right hand dominant [Past-pointing] : there was no past-pointing [Paresis Pronator Drift Left-Sided] : no pronator drift on the left [Tremor] : no tremor present [Dysdiadochokinesia Bilaterally] : not present

## 2020-06-02 NOTE — DATA REVIEWED
[FreeTextEntry1] :  EXAM:  MR BRAIN  \par \par \par PROCEDURE DATE:  04/20/2020  \par \par \par \par INTERPRETATION:  INDICATION:    Stroke\par TECHNIQUE:  Multiplanar brain imaging was conducted using a 1.5 Sagrario magnet.  T1, T2 and FLAIR imaging was performed.  In addition, diffusion imaging, diffusion coefficient assessment (ADC) and echo planar T2* was incorporated. No contrast administered  \par COMPARISON EXAMINATION:  CT 2/16/2020\par \par FINDINGS:\par HEMISPHERES:  There is an old left basal ganglia infarct. There are mild involutional changes. No acute abnormality is suggested. No diffusion restriction is noted. No microhemorrhage or mass is noted as well.\par VENTRICLES:  midline and normal in size\par POSTERIOR FOSSA:  The brain stem and cerebellum are unremarkable in appearance.  No CP angle abnormality is noted.\par EXTRA-AXIAL:  No mass or collections are depicted.\par CRANIAL NERVES:  No abnormality noted.\par VASCULATURE:  The major vessels and venous sinuses are grossly patent.  \par SINUSES AND MASTOIDS:  Clear.\par MISCELLANEOUS:  No orbital or pituitary abnormality noted.  No skull base lesion suggested.\par \par IMPRESSION:    \par 1)  old left basal ganglia infarct. Mild involutional changes, with no evidence of an acute abnormality or space occupying lesion..\par 2)  clear sinuses and mastoids.. \par \par \par \par IMPRESSION:\par \par Redemonstration, mild volume loss, microvascular disease, remote remote left caudate head and left anterior limb internal capsule lentiform nuclei infarct, age indeterminate lacunar infarcts, and left lateral ventricle frontal horn ex vacuo enlargement.\par \par Nonspecific slightly more pronounced white matter decreased attenuation, left frontal and parietal lobes, new ischemia not excluded, DWI MR more sensitive for new ischemia. No new hemorrhage or midline shift. Basal cisterns remain patent.\par \par Stenosis proximal left M1 segment, multifocal stenosis, distal left M1, M2 and M3 distal branches slightly decreased in size and number.\par \par Patent extracranial vessels, no hemodynamically significant stenosis at ICA origins by NASCET criteria.\par

## 2020-06-18 ENCOUNTER — INPATIENT (INPATIENT)
Facility: HOSPITAL | Age: 55
LOS: 0 days | Discharge: HOME | DRG: 68 | End: 2020-06-19
Attending: FAMILY MEDICINE | Admitting: INTERNAL MEDICINE
Payer: COMMERCIAL

## 2020-06-18 VITALS
DIASTOLIC BLOOD PRESSURE: 110 MMHG | TEMPERATURE: 98 F | WEIGHT: 162.04 LBS | SYSTOLIC BLOOD PRESSURE: 195 MMHG | HEART RATE: 65 BPM | HEIGHT: 60 IN | OXYGEN SATURATION: 98 % | RESPIRATION RATE: 18 BRPM

## 2020-06-18 DIAGNOSIS — G45.9 TRANSIENT CEREBRAL ISCHEMIC ATTACK, UNSPECIFIED: ICD-10-CM

## 2020-06-18 DIAGNOSIS — Z98.891 HISTORY OF UTERINE SCAR FROM PREVIOUS SURGERY: Chronic | ICD-10-CM

## 2020-06-18 DIAGNOSIS — Z90.49 ACQUIRED ABSENCE OF OTHER SPECIFIED PARTS OF DIGESTIVE TRACT: Chronic | ICD-10-CM

## 2020-06-18 LAB
ALBUMIN SERPL ELPH-MCNC: 3.5 G/DL — SIGNIFICANT CHANGE UP (ref 3.3–5)
ALP SERPL-CCNC: 130 U/L — HIGH (ref 40–120)
ALT FLD-CCNC: 37 U/L — SIGNIFICANT CHANGE UP (ref 10–45)
ANION GAP SERPL CALC-SCNC: 5 MMOL/L — SIGNIFICANT CHANGE UP (ref 5–17)
APPEARANCE UR: CLEAR — SIGNIFICANT CHANGE UP
APTT BLD: 33.7 SEC — SIGNIFICANT CHANGE UP (ref 27.5–36.3)
AST SERPL-CCNC: 19 U/L — SIGNIFICANT CHANGE UP (ref 10–40)
BACTERIA # UR AUTO: ABNORMAL /HPF
BASOPHILS # BLD AUTO: 0.05 K/UL — SIGNIFICANT CHANGE UP (ref 0–0.2)
BASOPHILS NFR BLD AUTO: 0.8 % — SIGNIFICANT CHANGE UP (ref 0–2)
BILIRUB SERPL-MCNC: 0.5 MG/DL — SIGNIFICANT CHANGE UP (ref 0.2–1.2)
BILIRUB UR-MCNC: NEGATIVE — SIGNIFICANT CHANGE UP
BUN SERPL-MCNC: 15 MG/DL — SIGNIFICANT CHANGE UP (ref 7–23)
CALCIUM SERPL-MCNC: 8.7 MG/DL — SIGNIFICANT CHANGE UP (ref 8.4–10.5)
CHLORIDE SERPL-SCNC: 104 MMOL/L — SIGNIFICANT CHANGE UP (ref 96–108)
CO2 SERPL-SCNC: 30 MMOL/L — SIGNIFICANT CHANGE UP (ref 22–31)
COLOR SPEC: YELLOW — SIGNIFICANT CHANGE UP
COMMENT - URINE: SIGNIFICANT CHANGE UP
CREAT SERPL-MCNC: 0.68 MG/DL — SIGNIFICANT CHANGE UP (ref 0.5–1.3)
DIFF PNL FLD: ABNORMAL
EOSINOPHIL # BLD AUTO: 0.32 K/UL — SIGNIFICANT CHANGE UP (ref 0–0.5)
EOSINOPHIL NFR BLD AUTO: 4.9 % — SIGNIFICANT CHANGE UP (ref 0–6)
EPI CELLS # UR: SIGNIFICANT CHANGE UP
GLUCOSE SERPL-MCNC: 115 MG/DL — HIGH (ref 70–99)
GLUCOSE UR QL: NEGATIVE — SIGNIFICANT CHANGE UP
HCT VFR BLD CALC: 43.7 % — SIGNIFICANT CHANGE UP (ref 34.5–45)
HGB BLD-MCNC: 14.8 G/DL — SIGNIFICANT CHANGE UP (ref 11.5–15.5)
IMM GRANULOCYTES NFR BLD AUTO: 0.2 % — SIGNIFICANT CHANGE UP (ref 0–1.5)
INR BLD: 1.09 RATIO — SIGNIFICANT CHANGE UP (ref 0.88–1.16)
KETONES UR-MCNC: NEGATIVE — SIGNIFICANT CHANGE UP
LEUKOCYTE ESTERASE UR-ACNC: NEGATIVE — SIGNIFICANT CHANGE UP
LYMPHOCYTES # BLD AUTO: 1.51 K/UL — SIGNIFICANT CHANGE UP (ref 1–3.3)
LYMPHOCYTES # BLD AUTO: 23.2 % — SIGNIFICANT CHANGE UP (ref 13–44)
MCHC RBC-ENTMCNC: 28.5 PG — SIGNIFICANT CHANGE UP (ref 27–34)
MCHC RBC-ENTMCNC: 33.9 GM/DL — SIGNIFICANT CHANGE UP (ref 32–36)
MCV RBC AUTO: 84 FL — SIGNIFICANT CHANGE UP (ref 80–100)
MONOCYTES # BLD AUTO: 0.64 K/UL — SIGNIFICANT CHANGE UP (ref 0–0.9)
MONOCYTES NFR BLD AUTO: 9.8 % — SIGNIFICANT CHANGE UP (ref 2–14)
NEUTROPHILS # BLD AUTO: 3.99 K/UL — SIGNIFICANT CHANGE UP (ref 1.8–7.4)
NEUTROPHILS NFR BLD AUTO: 61.1 % — SIGNIFICANT CHANGE UP (ref 43–77)
NITRITE UR-MCNC: NEGATIVE — SIGNIFICANT CHANGE UP
NRBC # BLD: 0 /100 WBCS — SIGNIFICANT CHANGE UP (ref 0–0)
PH UR: 6 — SIGNIFICANT CHANGE UP (ref 5–8)
PLATELET # BLD AUTO: 347 K/UL — SIGNIFICANT CHANGE UP (ref 150–400)
POTASSIUM SERPL-MCNC: 3.8 MMOL/L — SIGNIFICANT CHANGE UP (ref 3.5–5.3)
POTASSIUM SERPL-SCNC: 3.8 MMOL/L — SIGNIFICANT CHANGE UP (ref 3.5–5.3)
PROT SERPL-MCNC: 7.9 G/DL — SIGNIFICANT CHANGE UP (ref 6–8.3)
PROT UR-MCNC: NEGATIVE — SIGNIFICANT CHANGE UP
PROTHROM AB SERPL-ACNC: 12.4 SEC — SIGNIFICANT CHANGE UP (ref 10–12.9)
RBC # BLD: 5.2 M/UL — SIGNIFICANT CHANGE UP (ref 3.8–5.2)
RBC # FLD: 13.1 % — SIGNIFICANT CHANGE UP (ref 10.3–14.5)
RBC CASTS # UR COMP ASSIST: SIGNIFICANT CHANGE UP /HPF (ref 0–4)
SARS-COV-2 RNA SPEC QL NAA+PROBE: SIGNIFICANT CHANGE UP
SODIUM SERPL-SCNC: 139 MMOL/L — SIGNIFICANT CHANGE UP (ref 135–145)
SP GR SPEC: 1.01 — SIGNIFICANT CHANGE UP (ref 1.01–1.02)
TROPONIN I SERPL-MCNC: <.017 NG/ML — LOW (ref 0.02–0.06)
UROBILINOGEN FLD QL: NEGATIVE — SIGNIFICANT CHANGE UP
WBC # BLD: 6.52 K/UL — SIGNIFICANT CHANGE UP (ref 3.8–10.5)
WBC # FLD AUTO: 6.52 K/UL — SIGNIFICANT CHANGE UP (ref 3.8–10.5)
WBC UR QL: NEGATIVE /HPF — SIGNIFICANT CHANGE UP (ref 0–5)

## 2020-06-18 PROCEDURE — 70498 CT ANGIOGRAPHY NECK: CPT | Mod: 26

## 2020-06-18 PROCEDURE — 93010 ELECTROCARDIOGRAM REPORT: CPT

## 2020-06-18 PROCEDURE — 99284 EMERGENCY DEPT VISIT MOD MDM: CPT

## 2020-06-18 PROCEDURE — 70450 CT HEAD/BRAIN W/O DYE: CPT | Mod: 26,59

## 2020-06-18 PROCEDURE — 99223 1ST HOSP IP/OBS HIGH 75: CPT

## 2020-06-18 PROCEDURE — 70496 CT ANGIOGRAPHY HEAD: CPT | Mod: 26

## 2020-06-18 RX ORDER — CLOPIDOGREL BISULFATE 75 MG/1
75 TABLET, FILM COATED ORAL DAILY
Refills: 0 | Status: DISCONTINUED | OUTPATIENT
Start: 2020-06-18 | End: 2020-06-19

## 2020-06-18 RX ORDER — ASPIRIN/CALCIUM CARB/MAGNESIUM 324 MG
81 TABLET ORAL DAILY
Refills: 0 | Status: DISCONTINUED | OUTPATIENT
Start: 2020-06-18 | End: 2020-06-19

## 2020-06-18 RX ORDER — ATORVASTATIN CALCIUM 80 MG/1
80 TABLET, FILM COATED ORAL AT BEDTIME
Refills: 0 | Status: DISCONTINUED | OUTPATIENT
Start: 2020-06-18 | End: 2020-06-19

## 2020-06-18 RX ORDER — LOSARTAN POTASSIUM 100 MG/1
50 TABLET, FILM COATED ORAL ONCE
Refills: 0 | Status: COMPLETED | OUTPATIENT
Start: 2020-06-18 | End: 2020-06-18

## 2020-06-18 RX ORDER — LOSARTAN POTASSIUM 100 MG/1
100 TABLET, FILM COATED ORAL DAILY
Refills: 0 | Status: DISCONTINUED | OUTPATIENT
Start: 2020-06-18 | End: 2020-06-18

## 2020-06-18 RX ADMIN — LOSARTAN POTASSIUM 50 MILLIGRAM(S): 100 TABLET, FILM COATED ORAL at 21:50

## 2020-06-18 RX ADMIN — Medication 81 MILLIGRAM(S): at 18:08

## 2020-06-18 RX ADMIN — CLOPIDOGREL BISULFATE 75 MILLIGRAM(S): 75 TABLET, FILM COATED ORAL at 19:16

## 2020-06-18 RX ADMIN — ATORVASTATIN CALCIUM 80 MILLIGRAM(S): 80 TABLET, FILM COATED ORAL at 21:51

## 2020-06-18 NOTE — ED ADULT TRIAGE NOTE - CHIEF COMPLAINT QUOTE
Per EMS " She was c/o right sided numbness and unable to speak for a few minutes which has resolved "

## 2020-06-18 NOTE — H&P ADULT - HISTORY OF PRESENT ILLNESS
54 year old female with PMHx of BPV, HLD, HTN, TIA in April of 2020 here with c/o dizziness, right sided weakness and aphasia which occurred at about 9:30 am. Per patient symptoms lasted about 5-10 minutes and resolved prior to arrival to the ED. Pt states that she takes her medications daily. Denies headache, chest pain, SOB, nausea, vomiting, fever, chills. 54 year old female with PMHx of BPPV, HLD, HTN, TIA in April of 2020 who presents with c/o dizziness, right sided weakness and aphasia which occurred at about 9:30 am. Per patient symptoms lasted about 5-10 minutes and resolved prior to arrival to the ED. During my interview patient states that she no longer has weakness, difficulty speaking on any additional complaints. Denies headache, chest pain, SOB, nausea, vomiting, fever, chills. 54 year old female with PMHx of BPPV, HLD, HTN, TIA in April of 2020 who was BIBEMS with c/o dizziness, right sided weakness and aphasia which occurred at about 9:30 am. Pt states that she was cleaning at work then suddenly experienced dizziness and the inability to speak, EMS was then called by her boss. Per patient symptoms lasted about 5-10 minutes and resolved prior to arrival to the ED. During my interview patient states that she no longer has weakness, difficulty speaking or any additional complaints. Denies headache, chest pain, SOB, nausea, vomiting, fever, chills.

## 2020-06-18 NOTE — CONSULT NOTE ADULT - SUBJECTIVE AND OBJECTIVE BOX
Neurology consult    JEFFREY [id number]     Patient is a 54y old  Female who presents with a chief complaint of TIA (2020 16:25)      HPI:  54 year old female with PMHx of BPPV, HLD, HTN, TIA in 2020 who presents with c/o dizziness, right sided weakness and aphasia which occurred at about 9:30 am. Per patient symptoms lasted about 5-10 minutes and resolved prior to arrival to the ED. During my interview patient states that she no longer has weakness, difficulty speaking on any additional complaints. Denies headache, chest pain, SOB, nausea, vomiting, fever, chills. (2020 16:25)    Work up in 2020 confirmed a left MCA significant stenosis and MRI brain was negative for CVA.  Patient did well until today with brief aphasia and right sided weakness resloved.  No headache.  Positive for generalized malaise with this episode.  No syncope or seizure,  Patient follows in family practice clinic with no neuro f/u after April episode.          REVIEW OF SYSTEMS:    Constitutional: No fever, chills, fatigue, weakness  Eyes: no eye pain, visual disturbances, or discharge  ENT:  No difficulty hearing, tinnitus, vertigo; No sinus or throat pain  Neck: No pain or stiffness  Respiratory: No cough, dyspnea, wheezing   Cardiovascular: No chest pain, palpitations,   Gastrointestinal: No abdominal or epigastric pain. No nausea, vomiting  No diarrhea or constipation.   Genitourinary: No dysuria, frequency, hematuria or incontinence  Neurological: No headaches, lightheadedness, vertigo, numbness or tremors  Psychiatric: No depression, anxiety, mood swings or difficulty sleeping  Musculoskeletal: No joint pain or swelling; No muscle, back or extremity pain  Skin: No itching, burning, rashes or lesions   Lymph Nodes: No enlarged glands  Endocrine: No heat or cold intolerance; No hair loss, No h/o diabetes or thyroid dysfunction  Allergy and Immunologic: No hives or eczema    MEDICATIONS    aspirin enteric coated 81 milliGRAM(s) Oral daily  atorvastatin 80 milliGRAM(s) Oral at bedtime  clopidogrel Tablet 75 milliGRAM(s) Oral daily      PMH: Vertigo  Hypertension  Epigastric burning sensation       PSH: H/O:   S/P appendectomy  No significant past surgical history      Family history:   FAMILY HISTORY:  FH: HTN (hypertension)      SOCIAL HISTORY:  No history of tobacco or alcohol use     Allergies    amlodipine (Unknown)  Nyquil Cold Medicine (Unknown)  NyQuil D (Unknown)    Intolerances        Height (cm): 152.4 ( @ 10:51)  Weight (kg): 73.5 ( @ 10:51)  BMI (kg/m2): 31.6 ( @ 10:51)    Vital Signs Last 24 Hrs  T(C): 36.7 (2020 10:51), Max: 36.7 (2020 10:51)  T(F): 98.1 (2020 10:51), Max: 98.1 (2020 10:51)  HR: 66 (2020 15:56) (55 - 66)  BP: 175/93 (2020 15:56) (168/81 - 195/110)  BP(mean): 115 (2020 15:56) (100 - 118)  RR: 17 (2020 15:56) (16 - 18)  SpO2: 98% (2020 15:56) (95% - 100%)      On Neurological Examination:    Head: normocephalic Neck: supple no carotid bruits    Mental Status - Patient is alert, oriented speech is normal    Cranial Nerves - PERRL, EOMI, VFF, normal V through XII no nystagmus    Motor Exam :  No drift normal strength tone and coordination no abnormal movements no focality    Sensory    Intact to light touch and pinprick bilaterally normal DSS to touch    Reflexes:  symmetric  plantars downgoing                                                           LABS:  CBC Full  -  ( 2020 11:09 )  WBC Count : 6.52 K/uL  RBC Count : 5.20 M/uL  Hemoglobin : 14.8 g/dL  Hematocrit : 43.7 %  Platelet Count - Automated : 347 K/uL  Mean Cell Volume : 84.0 fl  Mean Cell Hemoglobin : 28.5 pg  Mean Cell Hemoglobin Concentration : 33.9 gm/dL  Auto Neutrophil # : 3.99 K/uL  Auto Lymphocyte # : 1.51 K/uL  Auto Monocyte # : 0.64 K/uL  Auto Eosinophil # : 0.32 K/uL  Auto Basophil # : 0.05 K/uL  Auto Neutrophil % : 61.1 %  Auto Lymphocyte % : 23.2 %  Auto Monocyte % : 9.8 %  Auto Eosinophil % : 4.9 %  Auto Basophil % : 0.8 %    Urinalysis Basic - ( 2020 13:40 )    Color: Yellow / Appearance: Clear / S.015 / pH: x  Gluc: x / Ketone: Negative  / Bili: Negative / Urobili: Negative   Blood: x / Protein: Negative / Nitrite: Negative   Leuk Esterase: Negative / RBC: 0-4 /HPF / WBC Negative /HPF   Sq Epi: x / Non Sq Epi: Neg.-Few / Bacteria: Trace /HPF          139  |  104  |  15  ----------------------------<  115<H>  3.8   |  30  |  0.68    Ca    8.7      2020 11:09    TPro  7.9  /  Alb  3.5  /  TBili  0.5  /  DBili  x   /  AST  19  /  ALT  37  /  AlkPhos  130<H>      LIVER FUNCTIONS - ( 2020 11:09 )  Alb: 3.5 g/dL / Pro: 7.9 g/dL / ALK PHOS: 130 U/L / ALT: 37 U/L / AST: 19 U/L / GGT: x           Hemoglobin A1C:       PT/INR - ( 2020 11:09 )   PT: 12.4 sec;   INR: 1.09 ratio         PTT - ( 2020 11:09 )  PTT:33.7 sec          RADIOLOGY  CT:           < from: CT Brain Stroke Protocol (20 @ 11:03) >    EXAM:  CT BRAIN STROKE PROTOCOL      PROCEDURE DATE:  2020        INTERPRETATION:  CLINICAL Indications:  r/o stroke    COMPARISON: CT head dated 2020 and MRI brain dated 2020    TECHNIQUE: Noncontrast CT of the head. Multiplanar reformations are submitted. RAPID artificial intelligence was utilized for the preliminary evaluation of intracranial hemorrhage.      FINDINGS: Chronic small left caudate head infarct, unchanged. Small congenital retrocerebellar cyst, unchanged.  There is periventricular and subcortical white matter hypodensity without mass effect, nonspecific, likely representing mild chronic microvascular ischemic changes. There is no compelling evidence for an acute transcortical infarction. There is no evidence of mass, mass effect, midline shift or extra-axial fluid collection. The lateral ventricles and cortical sulci are age-appropriate in size and configuration. The orbits, mastoid air cells and visualized paranasal sinuses are unremarkable. The calvarium is intact.    IMPRESSION:  Mild chronic microvascular changes without evidence of an acute transcortical infarction or hemorrhage. MR is a more sensitive imaging modality for the evaluation of an acute infarction. Findings discussed with ED physician assistant Mrs. Mejia at 11:10 AM.                  < end of copied text >      < from: CT Angio Head w/ IV Cont (20 @ 13:38) >    EXAM:  CT ANGIO NECK (W)AW IC    EXAM:  CT ANGIO BRAIN (W)AW IC      PROCEDURE DATE:  2020        INTERPRETATION:  CT angiogram head and neck with contrast  History right-sided weakness and aphasia  Multiplanar MIPS included  Contrast: Omnipaque 95cc; 5cc discarded    There is moderately severe stenosis of the proximal left M1 segment over a length of less than 5 mm. There is overall diminished caliber of the distal MCA branches extending peripherally from this point. There is mild beading of the left A1 segment. There is no focal cut off or filling defect    The right middle and anterior and posterior cerebral arteries are patent. The right posterior cerebral artery is fed preferentially from the anterior circulation.    Examination of the cervical circulation is negative for carotid or vertebral artery stenosis, occlusion or dissection. The left vertebral artery is slightly dominant.    IMPRESSION: Moderately severe proximal left MCA stenosis        < end of copied text >

## 2020-06-18 NOTE — H&P ADULT - NSHPLABSRESULTS_GEN_ALL_CORE
EKG: sinus bradycardia, no signs of ischemia TTE Echo Complete w/o contrast w/ Doppler (04.09.20 @ 13:41)      1. Left ventricular ejection fraction, by visual estimation, is 60 to 65%.   2. Normal global left ventricular systolic function.   3. Spectral Doppler shows impaired relaxation pattern of left ventricular myocardial filling (Grade I diastolic dysfunction).   4. Thinning of the interatrial septum.   5. Mild thickening and calcification of the anterior and posterior mitral valve leaflets.   6. Sclerotic aortic valve with normal opening.   7. Pulmonary hypertension is absent.   8. LA volume Index is 14.1 ml/m² ml/m2.

## 2020-06-18 NOTE — ED PROVIDER NOTE - ATTENDING CONTRIBUTION TO CARE
Goldie with [facility]. Pt is 55 y/o female with PMHx of BPV, HLD, HTN, s/p TIA in April of 2020 (follows up with DR Miller) here for eval s/p episode of aphasia and RT sided weakness earlier today. As per pt the above sx lasted about 5 to 10 minutes and has since resolved, pr arrived asymptomatic to the ER. Pt denies HA, dizziness, visual changed, denies cp, sob, denies fall,/ head injury, had similar sx at that time, CTA head at that time showed old infarcts. back to baseline now. s/p TIA, sx resolved, CT head negative, will  get EKG, neuro consult by DR Miller; Pt to be admitted for w/u.   I performed a face to face bedside interview with patient regarding history of present illness, review of symptoms and past medical history. I completed an independent physical exam.  I have discussed the patient's plan of care with Physician Assistant (PA). I agree with note as stated above, having amended the EMR as needed to reflect my findings.   This includes History of Present Illness, HIV, Past Medical/Surgical/Family/Social History, Allergies and Home Medications, Review of Systems, Physical Exam, and any Progress Notes during the time I functioned as the attending physician for this patient.

## 2020-06-18 NOTE — ED PROVIDER NOTE - OBJECTIVE STATEMENT
Pt is 53 y/o female with PMHx of BPV, HLD, HTN, s/p TIA in April of 2020 (follows up with DR Miller) here for eval s/p episode of aphasia and RT sided weakness earlier today. As per pt the above sx lasted about 5 to 10 minutes and has since resolved, pr arrived asymptomatic to the ER. Pt denies HA, dizziness, visual changed, denies cp, sob, denies fall,/ head injury, had similar sx at that time, CTA head at that time showed old infarcts. back to baseline now.

## 2020-06-18 NOTE — ED ADULT NURSE REASSESSMENT NOTE - NS ED NURSE REASSESS COMMENT FT1
COVID-19 swab sent to lab. called lab to expedite. isolation precaution in place. Pt educated about wearing a mask. Continuous monitoring.

## 2020-06-18 NOTE — ED PROVIDER NOTE - PROGRESS NOTE DETAILS
KISHA Mejia - spoke with DR Hdez from [facility] stroke team - since pt asymptomatic she will undergo emergent MCA stenting, he advises continuing Plavix, ASA and statin and follow up with Daniel Buitrago from neuro.

## 2020-06-18 NOTE — ED ADULT NURSE REASSESSMENT NOTE - NS ED NURSE REASSESS COMMENT FT1
Dr. Bourne called to evaluate patient in triage for stroke like symptoms, BS done and taken straight to CT. RN notified.

## 2020-06-18 NOTE — ED PROVIDER NOTE - CLINICAL SUMMARY MEDICAL DECISION MAKING FREE TEXT BOX
Pt is 55 y/o female with PMHx of BPV, HLD, HTN, s/p TIA in April of 2020 (follows up with DR Miller) here for eval s/p episode of aphasia and RT sided weakness earlier today. As per pt the above sx lasted about 5 to 10 minutes and has since resolved, pr arrived asymptomatic to the ER. Pt denies HA, dizziness, visual changed, denies cp, sob, denies fall,/ head injury, had similar sx at that time, CTA head at that time showed old infarcts. back to baseline now. s/p TIA, sx resolved, CT head negative, will  get EKG, neuro consult by DR Miller; Pt is 55 y/o female with PMHx of BPV, HLD, HTN, s/p TIA in April of 2020 (follows up with DR Miller) here for eval s/p episode of aphasia and RT sided weakness earlier today. As per pt the above sx lasted about 5 to 10 minutes and has since resolved, pr arrived asymptomatic to the ER. Pt denies HA, dizziness, visual changed, denies cp, sob, denies fall,/ head injury, had similar sx at that time, CTA head at that time showed old infarcts. back to baseline now. s/p TIA, sx resolved, CT head negative, will  get EKG, neuro consult by DR Miller; Pt to be admitted for w/u.

## 2020-06-18 NOTE — ED ADULT NURSE NOTE - OBJECTIVE STATEMENT
54 yr old female BIB EMS from home for numbness. Paramedic states " She was c/o right sided numbness and unable to speak for a few minutes which has resolved ". Pt denies chest pain, shortness of breath, nausea, vomiting. PMHX: Vertigo, Hyperlipidemia, TIA's 54 yr old female BIB EMS from home for numbness. Paramedic states " She was c/o right sided numbness and unable to speak for a few minutes which has resolved ". Pt denies chest pain, shortness of breath, nausea, vomiting. PMHX: Vertigo, HTN, Hyperlipidemia, TIA's. Pt to CT Scan via stretcher. Finger stick 132 mg/dl. Speech clear, pt calm, A&Ox3. 54 yr old female BIB EMS from home for numbness. Paramedic states " She was c/o right sided numbness and unable to speak for a few minutes which has resolved ". Pt states she was feeling weak while working today, lasted about 5-10 minutes. Pt states she was here in April for a similar Episode and was seen Dr. Miller. Pt's symptoms resolved while in ED. Pt denies visual changes, speech clear. Pt denies chest pain, shortness of breath, nausea,  dizziness, vomiting. PMHX: Vertigo, HTN, Hyperlipidemia, TIA's. Pt to CT Scan via stretcher. Finger stick 132 mg/dl. Speech clear, pt calm, A&Ox3.

## 2020-06-18 NOTE — CONSULT NOTE ADULT - ASSESSMENT
53 yo female with HTN HLD and recurrent TIA 4/20 and today.  Etiology is likely the left MCA stenosis. R/O cardioembolic etiology less likely.  Neuro exam is now unremarkable.    REC: Admit to telemetry and continue ASA Plavix high dose statin, cautious BP control, avoid hypotension, check lipids. Please schedule for outpatient vascular neurology consult regards the left MCA stenosis. Consider cardiology consult for ambulatory heart monitoring to r/o A Fib, review TTE.

## 2020-06-18 NOTE — H&P ADULT - ASSESSMENT
54 year old female with PMHx of BPPV, HLD, HTN, TIA in April of 2020 who presents with c/o dizziness, right sided weakness and aphasia which occurred at about 9:30 am on the day of admission.     #Aphasia  #Dizziness  #Right sided weakness  - resolved  - likely 2/2 TIA  - will admit to telemetry   - CT head: negative for acute mass, bleed, infarct   - CT angio head and neck: Moderately severe proximal left MCA stenosis  - continue aspirin, plavix, statin  - neuro checks  - neurology consult: Pt neurologist is Dr. Miller who evaluated her in the ER, recommends to eval overnight and discharge tomorrow with vascular neurology follow up. If any events over night will transfer to [place] for vascular intervention.     HTN  - on losartan 100 mg daily, will hold for now to allow for permissive HTN     DVT ppx  - SCD's    IMPROVE VTE Individual Risk Assessment    RISK                                                                Points    [  ] Previous VTE                                                  3    [  ] Thrombophilia                                               2    [  ] Lower limb paralysis                                      2        (unable to hold up >15 seconds)      [  ] Current Cancer                                              2         (within 6 months)    [  ] Immobilization > 24 hrs                                1    [  ] ICU/CCU stay > 24 hours                              1    [  ] Age > 60                                                      1    IMPROVE VTE Score _____0____    IMPROVE Score 0-1: Low Risk, No VTE prophylaxis required for most patients, encourage ambulation.   IMPROVE Score 2-3: At risk, pharmacologic VTE prophylaxis is indicated for most patients (in the absence of a contraindication)  IMPROVE Score > or = 4: High Risk, pharmacologic VTE prophylaxis is indicated for most patients (in the absence of a contraindication) 54 year old female with PMHx of BPPV, HLD, HTN, TIA in April of 2020 who presents with c/o dizziness, right sided weakness and aphasia which occurred at about 9:30 am on the day of admission.     #Aphasia  #Dizziness  #Right sided weakness  - resolved  - likely 2/2 TIA  - will admit to telemetry   - CT head: negative for acute mass, bleed, infarct   - CT angio head and neck: moderately severe proximal left MCA stenosis  - continue aspirin, plavix, statin  - neuro checks  - ECHO  - will check lipid profile and A1c  - cardiology consult   - neurology consult: Pt neurologist is Dr. Miller who evaluated her in the ER, recommends to eval overnight and discharge tomorrow with outpatient vascular neurology follow up. Cardiology consult for ambulatory heart monitoring. If any events over night will transfer to [place] for vascular intervention.     HTN  - on losartan 100 mg daily, will hold for now to allow for permissive HTN     DVT ppx  - SCD's    IMPROVE VTE Individual Risk Assessment    RISK                                                                Points    [  ] Previous VTE                                                  3    [  ] Thrombophilia                                               2    [  ] Lower limb paralysis                                      2        (unable to hold up >15 seconds)      [  ] Current Cancer                                              2         (within 6 months)    [  ] Immobilization > 24 hrs                                1    [  ] ICU/CCU stay > 24 hours                              1    [  ] Age > 60                                                      1    IMPROVE VTE Score _____0____    IMPROVE Score 0-1: Low Risk, No VTE prophylaxis required for most patients, encourage ambulation.   IMPROVE Score 2-3: At risk, pharmacologic VTE prophylaxis is indicated for most patients (in the absence of a contraindication)  IMPROVE Score > or = 4: High Risk, pharmacologic VTE prophylaxis is indicated for most patients (in the absence of a contraindication) 54 year old female with PMHx of BPPV, HLD, HTN, TIA in April of 2020 who presents with c/o dizziness, right sided weakness and aphasia which occurred at about 9:30 am on the day of admission.     #Aphasia  #Dizziness  #Right sided weakness  - resolved  - likely 2/2 TIA in the setting of left MCA stenosis   - will admit to telemetry   - CT head: negative for acute mass, bleed, infarct   - CT angio head and neck: moderately severe proximal left MCA stenosis  - EKG: sinus bradycardia, no signs of ischemia  - continue aspirin, plavix, statin  - neuro checks  - ECHO on 4/9/20: EF 60-65%, grade 1 diastolic dysfunction  - will check lipid profile and A1c  - cardiology consult   - neurology consult: Pt neurologist is Dr. Miller who evaluated her in the ER, recommends to eval overnight and discharge tomorrow with outpatient vascular neurology follow up. Cardiology consult for ambulatory heart monitoring. If any events over night will transfer to [place] for vascular intervention.     HTN  - on losartan 100 mg daily, will hold for now to allow for permissive HTN     DVT ppx  - SCD's    IMPROVE VTE Individual Risk Assessment    RISK                                                                Points    [  ] Previous VTE                                                  3    [  ] Thrombophilia                                               2    [  ] Lower limb paralysis                                      2        (unable to hold up >15 seconds)      [  ] Current Cancer                                              2         (within 6 months)    [  ] Immobilization > 24 hrs                                1    [  ] ICU/CCU stay > 24 hours                              1    [  ] Age > 60                                                      1    IMPROVE VTE Score _____0____    IMPROVE Score 0-1: Low Risk, No VTE prophylaxis required for most patients, encourage ambulation.   IMPROVE Score 2-3: At risk, pharmacologic VTE prophylaxis is indicated for most patients (in the absence of a contraindication)  IMPROVE Score > or = 4: High Risk, pharmacologic VTE prophylaxis is indicated for most patients (in the absence of a contraindication)

## 2020-06-18 NOTE — H&P ADULT - NSHPPHYSICALEXAM_GEN_ALL_CORE
Vital Signs (24 Hrs):  T(C): 36.7 (06-18-20 @ 10:51), Max: 36.7 (06-18-20 @ 10:51)  HR: 66 (06-18-20 @ 15:56) (55 - 66)  BP: 175/93 (06-18-20 @ 15:56) (168/81 - 195/110)  RR: 17 (06-18-20 @ 15:56) (16 - 18)  SpO2: 98% (06-18-20 @ 15:56) (95% - 100%)

## 2020-06-19 ENCOUNTER — TRANSCRIPTION ENCOUNTER (OUTPATIENT)
Age: 55
End: 2020-06-19

## 2020-06-19 VITALS
TEMPERATURE: 98 F | SYSTOLIC BLOOD PRESSURE: 161 MMHG | RESPIRATION RATE: 16 BRPM | DIASTOLIC BLOOD PRESSURE: 81 MMHG | HEART RATE: 60 BPM | OXYGEN SATURATION: 100 %

## 2020-06-19 LAB
A1C WITH ESTIMATED AVERAGE GLUCOSE RESULT: 6.2 % — HIGH (ref 4–5.6)
ANION GAP SERPL CALC-SCNC: 11 MMOL/L — SIGNIFICANT CHANGE UP (ref 5–17)
BUN SERPL-MCNC: 14 MG/DL — SIGNIFICANT CHANGE UP (ref 7–23)
CALCIUM SERPL-MCNC: 8.7 MG/DL — SIGNIFICANT CHANGE UP (ref 8.4–10.5)
CHLORIDE SERPL-SCNC: 104 MMOL/L — SIGNIFICANT CHANGE UP (ref 96–108)
CHOLEST SERPL-MCNC: 195 MG/DL — SIGNIFICANT CHANGE UP (ref 10–199)
CO2 SERPL-SCNC: 25 MMOL/L — SIGNIFICANT CHANGE UP (ref 22–31)
CREAT SERPL-MCNC: 0.8 MG/DL — SIGNIFICANT CHANGE UP (ref 0.5–1.3)
ESTIMATED AVERAGE GLUCOSE: 131 MG/DL — HIGH (ref 68–114)
GLUCOSE SERPL-MCNC: 116 MG/DL — HIGH (ref 70–99)
HCT VFR BLD CALC: 42.9 % — SIGNIFICANT CHANGE UP (ref 34.5–45)
HDLC SERPL-MCNC: 24 MG/DL — LOW
HGB BLD-MCNC: 14.2 G/DL — SIGNIFICANT CHANGE UP (ref 11.5–15.5)
LIPID PNL WITH DIRECT LDL SERPL: 143 MG/DL — HIGH
MCHC RBC-ENTMCNC: 27.8 PG — SIGNIFICANT CHANGE UP (ref 27–34)
MCHC RBC-ENTMCNC: 33.1 GM/DL — SIGNIFICANT CHANGE UP (ref 32–36)
MCV RBC AUTO: 84.1 FL — SIGNIFICANT CHANGE UP (ref 80–100)
NRBC # BLD: 0 /100 WBCS — SIGNIFICANT CHANGE UP (ref 0–0)
PLATELET # BLD AUTO: 314 K/UL — SIGNIFICANT CHANGE UP (ref 150–400)
POTASSIUM SERPL-MCNC: 3.5 MMOL/L — SIGNIFICANT CHANGE UP (ref 3.5–5.3)
POTASSIUM SERPL-SCNC: 3.5 MMOL/L — SIGNIFICANT CHANGE UP (ref 3.5–5.3)
RBC # BLD: 5.1 M/UL — SIGNIFICANT CHANGE UP (ref 3.8–5.2)
RBC # FLD: 13.2 % — SIGNIFICANT CHANGE UP (ref 10.3–14.5)
SODIUM SERPL-SCNC: 140 MMOL/L — SIGNIFICANT CHANGE UP (ref 135–145)
TOTAL CHOLESTEROL/HDL RATIO MEASUREMENT: 8 RATIO — HIGH (ref 3.3–7.1)
TRIGL SERPL-MCNC: 139 MG/DL — SIGNIFICANT CHANGE UP (ref 10–149)
WBC # BLD: 6.33 K/UL — SIGNIFICANT CHANGE UP (ref 3.8–10.5)
WBC # FLD AUTO: 6.33 K/UL — SIGNIFICANT CHANGE UP (ref 3.8–10.5)

## 2020-06-19 PROCEDURE — 99285 EMERGENCY DEPT VISIT HI MDM: CPT | Mod: 25

## 2020-06-19 PROCEDURE — 84484 ASSAY OF TROPONIN QUANT: CPT

## 2020-06-19 PROCEDURE — 93005 ELECTROCARDIOGRAM TRACING: CPT

## 2020-06-19 PROCEDURE — 82962 GLUCOSE BLOOD TEST: CPT

## 2020-06-19 PROCEDURE — 85610 PROTHROMBIN TIME: CPT

## 2020-06-19 PROCEDURE — 36000 PLACE NEEDLE IN VEIN: CPT

## 2020-06-19 PROCEDURE — 70450 CT HEAD/BRAIN W/O DYE: CPT

## 2020-06-19 PROCEDURE — 70496 CT ANGIOGRAPHY HEAD: CPT

## 2020-06-19 PROCEDURE — 85730 THROMBOPLASTIN TIME PARTIAL: CPT

## 2020-06-19 PROCEDURE — 81001 URINALYSIS AUTO W/SCOPE: CPT

## 2020-06-19 PROCEDURE — 80061 LIPID PANEL: CPT

## 2020-06-19 PROCEDURE — 85027 COMPLETE CBC AUTOMATED: CPT

## 2020-06-19 PROCEDURE — U0003: CPT

## 2020-06-19 PROCEDURE — 83036 HEMOGLOBIN GLYCOSYLATED A1C: CPT

## 2020-06-19 PROCEDURE — 70498 CT ANGIOGRAPHY NECK: CPT

## 2020-06-19 PROCEDURE — 80048 BASIC METABOLIC PNL TOTAL CA: CPT

## 2020-06-19 PROCEDURE — 80053 COMPREHEN METABOLIC PANEL: CPT

## 2020-06-19 RX ORDER — LOSARTAN POTASSIUM 100 MG/1
1 TABLET, FILM COATED ORAL
Qty: 30 | Refills: 0
Start: 2020-06-19 | End: 2020-07-18

## 2020-06-19 RX ORDER — ATORVASTATIN CALCIUM 80 MG/1
1 TABLET, FILM COATED ORAL
Qty: 30 | Refills: 0
Start: 2020-06-19 | End: 2020-07-18

## 2020-06-19 RX ORDER — CLOPIDOGREL BISULFATE 75 MG/1
1 TABLET, FILM COATED ORAL
Qty: 30 | Refills: 0
Start: 2020-06-19 | End: 2020-07-18

## 2020-06-19 RX ORDER — ASPIRIN/CALCIUM CARB/MAGNESIUM 324 MG
1 TABLET ORAL
Qty: 30 | Refills: 0
Start: 2020-06-19 | End: 2020-07-18

## 2020-06-19 RX ADMIN — CLOPIDOGREL BISULFATE 75 MILLIGRAM(S): 75 TABLET, FILM COATED ORAL at 11:56

## 2020-06-19 RX ADMIN — Medication 81 MILLIGRAM(S): at 11:56

## 2020-06-19 NOTE — DISCHARGE NOTE NURSING/CASE MANAGEMENT/SOCIAL WORK - PATIENT PORTAL LINK FT
You can access the FollowMyHealth Patient Portal offered by [facility] by registering at the following website: [email or web address]. By joining [facility]’s FollowMyHealth portal, you will also be able to view your health information using other applications (apps) compatible with our system.

## 2020-06-19 NOTE — DISCHARGE NOTE PROVIDER - PROVIDER TOKENS
FREE:[LAST:[Maged],FIRST:[Bonnie],PHONE:[phone]],FAX:[(   [phone]    -],ADDRESS:[[address] [place] [address], [address] [place], [place] [id number]]]

## 2020-06-19 NOTE — DISCHARGE NOTE PROVIDER - HOSPITAL COURSE
54 year old female with PMHx of BPPV, HLD, HTN, TIA in 2020 who was BIBEMS with c/o dizziness, right sided weakness and aphasia which occurred at about 9:30 am on the day of admission. Pt states that she was cleaning at work then suddenly experienced dizziness and the inability to speak, EMS was then called by her boss. Per patient symptoms lasted about 5-10 minutes and resolved prior to arrival to the ED. In the ED, CT head was negative for acute mass, bleed, infarct; CT angio head and neck revealed  moderately severe proximal left MCA stenosis. Pt was then evaluated by Neurology in the ED who recommended outpatient f/u with neuroradiology and cardiology evaluation for event recorder. Cardiology was contacted and patient was set up for outpatient event recorder placement on . In addition, I reached out to Dr. Petey Nicole ( Vascular Neurology) whose  will reach to the patient for an appointment for possible neuro vascular intervention. Pt has a follow up appointment with her Neurologist Dr. Carrera on  and a f/u appointment with the [facility] clinic on  at 1: 15 pm. On day of discharge patient is afebrile, hemodynamically stable with no motor/speech/sensory deficits. Will continue aspirin, plavix and atorvastatin on discharge. Losartan 100 mg was decreased to 50 mg to allow for permissive HTN will adjust medcaition as needed outaptient.         REVIEW OF SYSTEMS:        CONSTITUTIONAL: No weakness, fevers or chills    EYES/ENT: No visual changes;  No vertigo or throat pain     NECK: No pain or stiffness    RESPIRATORY: No cough, wheezing, hemoptysis; No shortness of breath    CARDIOVASCULAR: No chest pain or palpitations    GASTROINTESTINAL: No abdominal or epigastric pain. No nausea, vomiting, or hematemesis; No diarrhea or constipation. No melena or hematochezia.    GENITOURINARY: No dysuria, frequency or hematuria    NEUROLOGICAL: No numbness or weakness    SKIN: No itching, burning, rashes, or lesions     All other review of systems is negative unless indicated above.        Vital Signs (24 Hrs):    T(C): 36.8 (20 @ 09:41), Max: 36.8 (20 @ 09:41)    HR: 66 (20 @ 09:41) (55 - 70)    BP: 158/88 (20 @ 09:41) (149/80 - 195/110)    RR: 18 (20 @ 09:41) (16 - 18)    SpO2: 100% (20 @ 09:41) (95% - 100%)    Wt(kg): --    Daily Height in cm: 154.94 (2020 20:05)      Daily Weight in k.3 (2020 05:13)        PHYSICAL EXAM:    GENERAL: NAD, well-developed    HEAD:  Atraumatic, Normocephalic    EYES: EOMI, PERRLA, conjunctiva and sclera clear    NECK: Supple, No JVD    CHEST/LUNG: Clear to auscultation bilaterally; No wheeze, ronchi or rales    HEART: Regular rate and rhythm; No murmurs, rubs, or gallops    ABDOMEN: Soft, Nontender, Nondistended; Bowel sounds present    EXTREMITIES:  2+ Peripheral Pulses, No clubbing, cyanosis, or edema    PSYCH: AAOx3    NEUROLOGY: non-focal    SKIN: No rashes or lesions        < from: CT Angio Head w/ IV Cont (20 @ 13:38) >        IMPRESSION: Moderately severe proximal left MCA stenosis        < end of copied text >        < from: CT Brain Stroke Protocol (20 @ 11:03) >        IMPRESSION:  Mild chronic microvascular changes without evidence of an acute transcortical infarction or hemorrhage. MR is a more sensitive imaging modality for the evaluation of an acute infarction. Findings discussed with ED physician assistant Quinten Jackie at 11:10 AM.        < end of copied text > 54 year old female with PMHx of BPPV, HLD, HTN, TIA in 2020 who was BIBEMS with c/o dizziness, right sided weakness and aphasia which occurred at about 9:30 am on the day of admission. Pt states that she was cleaning at work then suddenly experienced dizziness and the inability to speak, EMS was then called by her boss. Per patient symptoms lasted about 5-10 minutes and resolved prior to arrival to the ED. In the ED, CT head was negative for acute mass, bleed, infarct; CT angio head and neck revealed  moderately severe proximal left MCA stenosis. Pt was then evaluated by Neurology in the ED who recommended outpatient f/u with vascular neurology and cardiology evaluation for event recorder. Cardiology was contacted and patient was set up for outpatient event recorder placement on . In addition, I reached out to Dr. Petey Nicole (Vascular Neurology) whose  will reach to the patient for an appointment for possible neuro vascular intervention. Pt has a follow up appointment with her Neurologist Dr. Carrera on  and a f/u appointment with the [facility] clinic on  at 1:15 pm. On day of discharge patient is afebrile, hemodynamically stable with no motor/speech/sensory deficits. Will continue aspirin, plavix and atorvastatin on discharge. Losartan 100 mg was decreased to 50 mg to allow for permissive HTN will adjust medcaition as needed outaptient.         REVIEW OF SYSTEMS:        CONSTITUTIONAL: No weakness, fevers or chills    EYES/ENT: No visual changes;  No vertigo or throat pain     NECK: No pain or stiffness    RESPIRATORY: No cough, wheezing, hemoptysis; No shortness of breath    CARDIOVASCULAR: No chest pain or palpitations    GASTROINTESTINAL: No abdominal or epigastric pain. No nausea, vomiting, or hematemesis; No diarrhea or constipation. No melena or hematochezia.    GENITOURINARY: No dysuria, frequency or hematuria    NEUROLOGICAL: No numbness or weakness    SKIN: No itching, burning, rashes, or lesions     All other review of systems is negative unless indicated above.        Vital Signs (24 Hrs):    T(C): 36.8 (20 @ 09:41), Max: 36.8 (20 @ 09:41)    HR: 66 (20 @ 09:41) (55 - 70)    BP: 158/88 (20 @ 09:41) (149/80 - 195/110)    RR: 18 (20 @ 09:41) (16 - 18)    SpO2: 100% (20 @ 09:41) (95% - 100%)    Wt(kg): --    Daily Height in cm: 154.94 (2020 20:05)      Daily Weight in k.3 (2020 05:13)        PHYSICAL EXAM:    GENERAL: NAD, well-developed    HEAD:  Atraumatic, Normocephalic    EYES: EOMI, PERRLA, conjunctiva and sclera clear    NECK: Supple, No JVD    CHEST/LUNG: Clear to auscultation bilaterally; No wheeze, ronchi or rales    HEART: Regular rate and rhythm; No murmurs, rubs, or gallops    ABDOMEN: Soft, Nontender, Nondistended; Bowel sounds present    EXTREMITIES:  2+ Peripheral Pulses, No clubbing, cyanosis, or edema    PSYCH: AAOx3    NEUROLOGY: non-focal    SKIN: No rashes or lesions        < from: CT Angio Head w/ IV Cont (20 @ 13:38) >        IMPRESSION: Moderately severe proximal left MCA stenosis        < end of copied text >        < from: CT Brain Stroke Protocol (20 @ 11:03) >        IMPRESSION:  Mild chronic microvascular changes without evidence of an acute transcortical infarction or hemorrhage. MR is a more sensitive imaging modality for the evaluation of an acute infarction. Findings discussed with ED physician assistant Mrs. Mejia at 11:10 AM.        < end of copied text >

## 2020-06-19 NOTE — DISCHARGE NOTE PROVIDER - NSDCFUSCHEDAPPT_GEN_ALL_CORE_FT
JEFFREY FINN ; 06/26/2020 ; NPP [place]ammed  [facility]  JEFFREY FINN ; 06/29/2020 ; NPP Neurology [address] JEFFREY FINN ; 06/26/2020 ; NPP [place]  [facility]  JEFFREY FINN ; 06/29/2020 ; NPP Neurology [address]

## 2020-06-19 NOTE — DISCHARGE NOTE PROVIDER - NSDCMRMEDTOKEN_GEN_ALL_CORE_FT
ascorbic acid 500 mg oral tablet, chewable: 1 tab(s) orally once a day  aspirin 81 mg oral tablet, chewable: 1 tab(s) orally once a day MDD:1  atorvastatin 80 mg oral tablet: 1 tab(s) orally once a day (at bedtime) MDD:1  clopidogrel 75 mg oral tablet: 1 tab(s) orally once a day MDD:1  losartan 50 mg oral tablet: 1 tab(s) orally once a day

## 2020-06-19 NOTE — DISCHARGE NOTE NURSING/CASE MANAGEMENT/SOCIAL WORK - NSDCPEPTSTRK_GEN_ALL_CORE
Prescribed medications/Risk factors for stroke/Stroke support groups for patients, families, and friends/Need for follow up after discharge/Stroke education booklet/Signs and symptoms of stroke/Stroke warning signs and symptoms/Call 911 for stroke

## 2020-06-19 NOTE — DISCHARGE NOTE PROVIDER - CARE PROVIDER_API CALL
Bonnie Lynne  [address] [place] [address], [address] [place], [place] [id number]  Phone: [phone]  Fax: (   )    -  Follow Up Time:

## 2020-06-19 NOTE — DISCHARGE NOTE PROVIDER - NSDCCPCAREPLAN_GEN_ALL_CORE_FT
PRINCIPAL DISCHARGE DIAGNOSIS  Diagnosis: TIA (transient ischemic attack)  Assessment and Plan of Treatment: - Your difficulty speaking, dizziness and right sided weakness were likley due to partial blockage of one of the vessels that supples blood to your brain. Your symptoms resolved prior to arrival to the emergeny room.   - Continue aspirin, plavix, statin. Prescription sent to your pharmacy please take as prescribed.   - Follow up with your Neurologist Dr. Carrera on 6/29  - Follow up with Dr. Petey Nicole Vascular Neurology his office number is ([phone]). Expect a call from his  to set up an appointment.   - Follow up at [facility] on 6/22 to have the event recorder placed.    - If you experience weakness, difficulty speaking, facial droop go to the emergency room immediately.         SECONDARY DISCHARGE DIAGNOSES  Diagnosis: HTN (hypertension)  Assessment and Plan of Treatment: - losartan 100 mg decreased to 50 mg. Prescription sent to your pharmacy please take as prescribed.   - follow up with your primary care provider at [facility] next week, at that time your blood pressure medication will be adjusted if needed.

## 2020-06-26 ENCOUNTER — APPOINTMENT (OUTPATIENT)
Dept: FAMILY MEDICINE | Facility: HOSPITAL | Age: 55
End: 2020-06-26

## 2020-06-26 ENCOUNTER — OUTPATIENT (OUTPATIENT)
Dept: OUTPATIENT SERVICES | Facility: HOSPITAL | Age: 55
LOS: 1 days | End: 2020-06-26
Payer: COMMERCIAL

## 2020-06-26 DIAGNOSIS — Z90.49 ACQUIRED ABSENCE OF OTHER SPECIFIED PARTS OF DIGESTIVE TRACT: Chronic | ICD-10-CM

## 2020-06-26 DIAGNOSIS — Z98.891 HISTORY OF UTERINE SCAR FROM PREVIOUS SURGERY: Chronic | ICD-10-CM

## 2020-06-26 DIAGNOSIS — G45.9 TRANSIENT CEREBRAL ISCHEMIC ATTACK, UNSPECIFIED: ICD-10-CM

## 2020-06-26 PROCEDURE — 93270 REMOTE 30 DAY ECG REV/REPORT: CPT

## 2020-06-30 ENCOUNTER — OUTPATIENT (OUTPATIENT)
Dept: OUTPATIENT SERVICES | Facility: HOSPITAL | Age: 55
LOS: 1 days | End: 2020-06-30
Payer: COMMERCIAL

## 2020-06-30 ENCOUNTER — APPOINTMENT (OUTPATIENT)
Dept: FAMILY MEDICINE | Facility: HOSPITAL | Age: 55
End: 2020-06-30

## 2020-06-30 VITALS
HEART RATE: 80 BPM | TEMPERATURE: 98.4 F | WEIGHT: 168 LBS | RESPIRATION RATE: 16 BRPM | BODY MASS INDEX: 28.84 KG/M2 | DIASTOLIC BLOOD PRESSURE: 83 MMHG | SYSTOLIC BLOOD PRESSURE: 141 MMHG | OXYGEN SATURATION: 98 %

## 2020-06-30 DIAGNOSIS — Z90.49 ACQUIRED ABSENCE OF OTHER SPECIFIED PARTS OF DIGESTIVE TRACT: Chronic | ICD-10-CM

## 2020-06-30 DIAGNOSIS — Z98.891 HISTORY OF UTERINE SCAR FROM PREVIOUS SURGERY: Chronic | ICD-10-CM

## 2020-06-30 DIAGNOSIS — R11.0 NAUSEA: ICD-10-CM

## 2020-06-30 DIAGNOSIS — Z00.00 ENCOUNTER FOR GENERAL ADULT MEDICAL EXAMINATION WITHOUT ABNORMAL FINDINGS: ICD-10-CM

## 2020-06-30 PROCEDURE — G0463: CPT

## 2020-06-30 RX ORDER — OMEPRAZOLE MAGNESIUM 20 MG/1
20 TABLET, DELAYED RELEASE ORAL DAILY
Qty: 30 | Refills: 0 | Status: ACTIVE | COMMUNITY
Start: 2020-06-30 | End: 1900-01-01

## 2020-06-30 NOTE — PHYSICAL EXAM
[No JVD] : no jugular venous distention [Well-Appearing] : well-appearing [No Acute Distress] : no acute distress [Clear to Auscultation] : lungs were clear to auscultation bilaterally [No Respiratory Distress] : no respiratory distress  [Supple] : supple [Regular Rhythm] : with a regular rhythm [Normal Rate] : normal rate  [Normal S1, S2] : normal S1 and S2 [No Carotid Bruits] : no carotid bruits [No Varicosities] : no varicosities [No Murmur] : no murmur heard [Pedal Pulses Present] : the pedal pulses are present [Non Tender] : non-tender [Soft] : abdomen soft [No HSM] : no HSM [No Masses] : no abdominal mass palpated [Non-distended] : non-distended [Normal Bowel Sounds] : normal bowel sounds [Coordination Grossly Intact] : coordination grossly intact [Normal Affect] : the affect was normal [Normal Gait] : normal gait [Normal Insight/Judgement] : insight and judgment were intact [de-identified] : B/l LE trace pitting edema

## 2020-06-30 NOTE — ASSESSMENT
[FreeTextEntry1] : 53 y/o PMHx HTN, HLD, TIA (4/20) presenting w/ c/o nausea and body aches for the past approx 2 weeks

## 2020-06-30 NOTE — HISTORY OF PRESENT ILLNESS
[FreeTextEntry8] : 55 y/o PMHx HTN, HLD, TIA (4/20) presenting w/ c/o nausea and body aches for the past approx 2 weeks. Was started on asa, plavix, statin, ARB, and diuretic after TIA in April. Was seen in ED again on 6/18 for brief episode of dizziness and pre-syncope and aphasia. Imaging notable for severe stenosis of L MCA. Pt d/c w/ event recorder and to f/u w/ cardio and neurology. Pt has not taken atorvastatin for the past 2 days due to, cannot tolerate even though has been taking since April. Also claims clopidegrel is making her feel the same and hasn’t taken it in 2 days as well. Today she feels fine except when she has to walk then gets short of breath since 6/18 but has been improving. Denies fever, HA, dizziness, CP, SOB at rest, abd pain, n/v/d. \par Appt w/ neurologist for yesterday was cancelled by their office and rescheduled for 7/17

## 2020-06-30 NOTE — REVIEW OF SYSTEMS
[Lower Ext Edema] : lower extremity edema [Dyspnea on Exertion] : dyspnea on exertion [Nausea] : nausea [Heartburn] : heartburn [Fever] : no fever [Chills] : no chills [Fatigue] : no fatigue [Chest Pain] : no chest pain [Shortness Of Breath] : no shortness of breath [Palpitations] : no palpitations [Abdominal Pain] : no abdominal pain [Cough] : no cough [Constipation] : no constipation [Vomiting] : no vomiting [Headache] : no headache [Dizziness] : no dizziness [Fainting] : no fainting [FreeTextEntry9] : diffuse muscle weakness/aches

## 2020-06-30 NOTE — PLAN
[FreeTextEntry1] : 53 y/o PMHx HTN, HLD, TIA (4/20) presenting w/ c/o nausea and body aches for the past approx 2 weeks\par \par #Nausea\par -Pt instructed to decrease atorvastatin to 40mg PO QD, anticipate this will reduce symptoms of nausea and myalgias\par -Pt instructed to continue taking all medications as prescribed inc clopidogrel\par -Appt made for cardiology f/u on 7/1 at 3pm w/ Dr Boyce, [address] [place], [place] [phone]\par -Pt has neurology appt rescheduled for 7/17\par -Vitamin B6 ordered for nausea \par -Start omeprazole 20mg PO QD as pt also w/ hx of gastritis\par \par RTC in 1 week for f/u of symptoms \par \par case d/w Dr. Blackwood

## 2020-07-01 ENCOUNTER — NON-APPOINTMENT (OUTPATIENT)
Age: 55
End: 2020-07-01

## 2020-07-01 ENCOUNTER — APPOINTMENT (OUTPATIENT)
Dept: CARDIOLOGY | Facility: CLINIC | Age: 55
End: 2020-07-01
Payer: COMMERCIAL

## 2020-07-01 VITALS
SYSTOLIC BLOOD PRESSURE: 150 MMHG | RESPIRATION RATE: 16 BRPM | DIASTOLIC BLOOD PRESSURE: 93 MMHG | TEMPERATURE: 98.9 F | HEIGHT: 64 IN | HEART RATE: 74 BPM | OXYGEN SATURATION: 99 % | BODY MASS INDEX: 28.51 KG/M2 | WEIGHT: 167 LBS

## 2020-07-01 DIAGNOSIS — R11.0 NAUSEA: ICD-10-CM

## 2020-07-01 DIAGNOSIS — G45.9 TRANSIENT CEREBRAL ISCHEMIC ATTACK, UNSPECIFIED: ICD-10-CM

## 2020-07-01 PROCEDURE — 99214 OFFICE O/P EST MOD 30 MIN: CPT

## 2020-07-01 PROCEDURE — 93000 ELECTROCARDIOGRAM COMPLETE: CPT

## 2020-07-03 PROBLEM — G45.9 TRANSIENT ISCHEMIC ATTACK: Status: ACTIVE | Noted: 2020-04-09

## 2020-07-03 NOTE — PHYSICAL EXAM
[General Appearance - Well Developed] : well developed [Normal Appearance] : normal appearance [Well Groomed] : well groomed [General Appearance - Well Nourished] : well nourished [No Deformities] : no deformities [General Appearance - In No Acute Distress] : no acute distress [Normal Conjunctiva] : the conjunctiva exhibited no abnormalities [Eyelids - No Xanthelasma] : the eyelids demonstrated no xanthelasmas [Normal Oral Mucosa] : normal oral mucosa [No Oral Pallor] : no oral pallor [No Oral Cyanosis] : no oral cyanosis [Normal Jugular Venous A Waves Present] : normal jugular venous A waves present [Normal Jugular Venous V Waves Present] : normal jugular venous V waves present [No Jugular Venous Nieves A Waves] : no jugular venous nieves A waves [Heart Rate And Rhythm] : heart rate and rhythm were normal [Heart Sounds] : normal S1 and S2 [Murmurs] : no murmurs present [Respiration, Rhythm And Depth] : normal respiratory rhythm and effort [Exaggerated Use Of Accessory Muscles For Inspiration] : no accessory muscle use [Auscultation Breath Sounds / Voice Sounds] : lungs were clear to auscultation bilaterally [Abdomen Soft] : soft [Abdomen Tenderness] : non-tender [Abdomen Mass (___ Cm)] : no abdominal mass palpated [Abnormal Walk] : normal gait [Gait - Sufficient For Exercise Testing] : the gait was sufficient for exercise testing [Nail Clubbing] : no clubbing of the fingernails [Cyanosis, Localized] : no localized cyanosis [Petechial Hemorrhages (___cm)] : no petechial hemorrhages [Skin Color & Pigmentation] : normal skin color and pigmentation [] : no rash [No Venous Stasis] : no venous stasis [Skin Lesions] : no skin lesions [No Skin Ulcers] : no skin ulcer [No Xanthoma] : no  xanthoma was observed [Oriented To Time, Place, And Person] : oriented to person, place, and time [Affect] : the affect was normal [Mood] : the mood was normal [No Anxiety] : not feeling anxious

## 2020-07-03 NOTE — REASON FOR VISIT
[Consultation] : a consultation regarding [Anticoagulation] : anticoagulation [Family Member] : family member [Other: _____] : [unfilled] [FreeTextEntry1] : 54 year old F w/ HTN and h/o migraines self-presented to ED complaining of right sided weakness and difficulty walking. States that her hand and her leg felt "heavy". Felt like she couldn't get her words and thoughts out when her body felt heavy. Unsure of onset of symptoms. Denies any vision changes, numbness or tingling at that time. Symptoms resolved on own in ED. Patient states that she had similar symptoms (right hand tingling) 4 times yesterday that resolved on its own, but then was followed by a left sided frontal headache last night that resolved with Tylenol. States that she takes BP medications regularly. IN ED Code stroke called. NIHSS 0. She had a outpatient holter that was unrevealing and had a recent admission to [facility] with similar complaints. An MRA discloses significant advanced peripheral arterial disease and antiplatelets were recommended. was advised to undergo a complete cardiac evaluation. she denies chest pains shortness of breath or loss of consciousness.\par

## 2020-07-03 NOTE — ASSESSMENT
[FreeTextEntry1] : 53 yo with a right hemiparesis\par I have asked Stefanie to undergo detailed cardiac testing in order to evaluate her overall cardiovascular risk. The Holter has returned unrevealing and it would appear the the main pathophysiology is related to advanced peripheral arterial disease. we would continue aggressive antiplatelet and anti lipids control and consider a long term implantable  event recorder. to rule out REZA as clinically warranted\par \par Quality measures \par Tobacco intervention Not indicated\par Statin for prevention of cardiovascular disease atorvastatin 80 mg\par Hypertension compensated\par Aspirin for ischemic vascular disease clopidogrel 75\par Tobacco screening cessation and intervention Not indicated\par \par Medical necessity\par This is a high encounter based upon two or more chronic illnesses with mild exacerbation requiring further management and evaluation.

## 2020-07-10 ENCOUNTER — APPOINTMENT (OUTPATIENT)
Dept: FAMILY MEDICINE | Facility: HOSPITAL | Age: 55
End: 2020-07-10

## 2020-07-17 ENCOUNTER — APPOINTMENT (OUTPATIENT)
Dept: NEUROLOGY | Facility: CLINIC | Age: 55
End: 2020-07-17
Payer: COMMERCIAL

## 2020-07-17 VITALS
DIASTOLIC BLOOD PRESSURE: 80 MMHG | SYSTOLIC BLOOD PRESSURE: 176 MMHG | HEIGHT: 64 IN | BODY MASS INDEX: 28.51 KG/M2 | HEART RATE: 79 BPM | TEMPERATURE: 97.9 F | WEIGHT: 167 LBS

## 2020-07-17 DIAGNOSIS — I65.22 OCCLUSION AND STENOSIS OF LEFT CAROTID ARTERY: ICD-10-CM

## 2020-07-17 DIAGNOSIS — G44.52 NEW DAILY PERSISTENT HEADACHE (NDPH): ICD-10-CM

## 2020-07-17 PROCEDURE — 99214 OFFICE O/P EST MOD 30 MIN: CPT

## 2020-07-17 NOTE — ASSESSMENT
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 Hypertension, daily headaches.Neurological examination, unremarkable.\par Discussed the importance of blood pressure control, advise weight loss, low salt diet.Plan: Carotid Dopplers to assess severity of left carotid stenosis.\par Electroencephalograph, rule out seizures.Nortriptyline 25 mg p.o. h.s.,if tolerated after one week, increase to 50 mg q.h.s.\par Return to office, up 4-6 weeks

## 2020-07-17 NOTE — DATA REVIEWED
[FreeTextEntry1] :  EXAM:  CT BRAIN STROKE PROTOCOL  \par \par PROCEDURE DATE:  06/18/2020  \par \par \par INTERPRETATION:  CLINICAL Indications:  r/o stroke\par \par COMPARISON: CT head dated 4/8/2020 and MRI brain dated 4/20/2020\par \par TECHNIQUE: Noncontrast CT of the head. Multiplanar reformations are submitted. RAPID artificial intelligence was utilized for the preliminary evaluation of intracranial hemorrhage.\par \par \par FINDINGS: Chronic small left caudate head infarct, unchanged. Small congenital retrocerebellar cyst, unchanged.\par There is periventricular and subcortical white matter hypodensity without mass effect, nonspecific, likely representing mild chronic microvascular ischemic changes. There is no compelling evidence for an acute transcortical infarction. There is no evidence of mass, mass effect, midline shift or extra-axial fluid collection. The lateral ventricles and cortical sulci are age-appropriate in size and configuration. The orbits, mastoid air cells and visualized paranasal sinuses are unremarkable. The calvarium is intact.\par \par IMPRESSION:  Mild chronic microvascular changes without evidence of an acute transcortical infarction or hemorrhage. MR is a more sensitive imaging modality for the evaluation of an acute infarction.\par \par \par \par  EXAM:  CT ANGIO NECK (W)AW IC  \par EXAM:  CT ANGIO BRAIN (W)AW IC  \par \par PROCEDURE DATE:  06/18/2020  \par \par \par INTERPRETATION:  CT angiogram head and neck with contrast\par History right-sided weakness and aphasia\par Multiplanar MIPS included\par Contrast: Omnipaque 95cc; 5cc discarded\par \par There is moderately severe stenosis of the proximal left M1 segment over a length of less than 5 mm. There is overall diminished caliber of the distal MCA branches extending peripherally from this point. There is mild beading of the left A1 segment. There is no focal cut off or filling defect\par \par The right middle and anterior and posterior cerebral arteries are patent. The right posterior cerebral artery is fed preferentially from the anterior circulation.\par \par Examination of the cervical circulation is negative for carotid or vertebral artery stenosis, occlusion or dissection. The left vertebral artery is slightly dominant.\par \par IMPRESSION: Moderately severe proximal left MCA stenosis\par \par \par  EXAM:  CT ANGIO NECK (W)AW IC  \par EXAM:  CT ANGIO BRAIN (W)AW IC  \par \par PROCEDURE DATE:  06/18/2020  \par \par \par INTERPRETATION:  CT angiogram head and neck with contrast\par History right-sided weakness and aphasia\par Multiplanar MIPS included\par Contrast: Omnipaque 95cc; 5cc discarded\par \par There is moderately severe stenosis of the proximal left M1 segment over a length of less than 5 mm. There is overall diminished caliber of the distal MCA branches extending peripherally from this point. There is mild beading of the left A1 segment. There is no focal cut off or filling defect\par \par The right middle and anterior and posterior cerebral arteries are patent. The right posterior cerebral artery is fed preferentially from the anterior circulation.\par \par Examination of the cervical circulation is negative for carotid or vertebral artery stenosis, occlusion or dissection. The left vertebral artery is slightly dominant.\par \par IMPRESSION: Moderately severe proximal left MCA stenosis\par

## 2020-07-17 NOTE — PHYSICAL EXAM
[General Appearance - Alert] : alert [General Appearance - In No Acute Distress] : in no acute distress [Oriented To Time, Place, And Person] : oriented to person, place, and time [Impaired Insight] : insight and judgment were intact [Affect] : the affect was normal [Person] : oriented to person [Place] : oriented to place [Time] : oriented to time [Concentration Intact] : normal concentrating ability [Visual Intact] : visual attention was ~T not ~L decreased [Naming Objects] : no difficulty naming common objects [Fluency] : fluency intact [Comprehension] : comprehension intact [Past History] : adequate knowledge of personal past history [Cranial Nerves Optic (II)] : visual acuity intact bilaterally,  visual fields full to confrontation, pupils equal round and reactive to light [Cranial Nerves Oculomotor (III)] : extraocular motion intact [Cranial Nerves Facial (VII)] : face symmetrical [Cranial Nerves Trigeminal (V)] : facial sensation intact symmetrically [Cranial Nerves Vestibulocochlear (VIII)] : hearing was intact bilaterally [Cranial Nerves Glossopharyngeal (IX)] : tongue and palate midline [Cranial Nerves Accessory (XI - Cranial And Spinal)] : head turning and shoulder shrug symmetric [Motor Strength] : muscle strength was normal in all four extremities [Cranial Nerves Hypoglossal (XII)] : there was no tongue deviation with protrusion [No Muscle Atrophy] : normal bulk in all four extremities [Motor Tone] : muscle tone was normal in all four extremities [Motor Handedness Right-Handed] : the patient is right hand dominant [Paresis Pronator Drift Left-Sided] : no pronator drift on the left [Sensation Tactile Decrease] : light touch was intact [Paresis Pronator Drift Right-Sided] : no pronator drift on the right [Abnormal Walk] : normal gait [Sensation Pain / Temperature Decrease] : pain and temperature was intact [Past-pointing] : there was no past-pointing [Balance] : balance was intact [Tremor] : no tremor present [Dysdiadochokinesia Bilaterally] : not present [Coordination - Dysmetria Impaired Finger-to-Nose Bilateral] : not present [2+] : Ankle jerk left 2+ [Plantar Reflex Right Only] : normal on the right [Plantar Reflex Left Only] : normal on the left [Sclera] : the sclera and conjunctiva were normal [Extraocular Movements] : extraocular movements were intact [PERRL With Normal Accommodation] : pupils were equal in size, round, reactive to light, with normal accommodation [Full Visual Field] : full visual field [Outer Ear] : the ears and nose were normal in appearance [Hearing Threshold Finger Rub Not Moore] : hearing was normal [Respiration, Rhythm And Depth] : normal respiratory rhythm and effort [] : no respiratory distress [Heart Rate And Rhythm] : heart rate was normal and rhythm regular [Arterial Pulses Carotid] : carotid pulses were normal with no bruits [No Spinal Tenderness] : no spinal tenderness [Full Pulse] : the pedal pulses are present [Edema] : there was no peripheral edema

## 2020-07-17 NOTE — HISTORY OF PRESENT ILLNESS
[FreeTextEntry1] : 55-year-old woman history of hypertension recent admission to the hospital for headaches, dizziness, right-sided weakness. Evaluations included an MRI of the brain demonstrated chronic left basal ganglia infarct,vascular studies demonstrated left internal carotid artery stenosis, moderate.Recent presentation to [facility] emergency room, June of this year, for headaches.Underwent a repeat CT scan of the head which did not demonstrate any acute changes.Repeat CT angiogram, with similar results.\par Complaining of recurrent daily headaches, usually starts at the other day, noted over the left temporal head, pressure, heaviness, moderate intensity, associated with blurred vision and dizziness and nausea. Noassociated weakness, numbness of the extremities. Some neck stiffness and pain on the left occiput.Episodes in the past of passing out, transient unawareness.No history of seizures.

## 2020-07-20 ENCOUNTER — APPOINTMENT (OUTPATIENT)
Dept: FAMILY MEDICINE | Facility: HOSPITAL | Age: 55
End: 2020-07-20

## 2020-07-23 ENCOUNTER — OUTPATIENT (OUTPATIENT)
Dept: OUTPATIENT SERVICES | Facility: HOSPITAL | Age: 55
LOS: 1 days | End: 2020-07-23
Payer: COMMERCIAL

## 2020-07-23 ENCOUNTER — APPOINTMENT (OUTPATIENT)
Dept: ULTRASOUND IMAGING | Facility: HOSPITAL | Age: 55
End: 2020-07-23
Payer: COMMERCIAL

## 2020-07-23 DIAGNOSIS — I65.22 OCCLUSION AND STENOSIS OF LEFT CAROTID ARTERY: ICD-10-CM

## 2020-07-23 DIAGNOSIS — Z90.49 ACQUIRED ABSENCE OF OTHER SPECIFIED PARTS OF DIGESTIVE TRACT: Chronic | ICD-10-CM

## 2020-07-23 DIAGNOSIS — Z98.891 HISTORY OF UTERINE SCAR FROM PREVIOUS SURGERY: Chronic | ICD-10-CM

## 2020-07-23 PROCEDURE — 93880 EXTRACRANIAL BILAT STUDY: CPT | Mod: 26

## 2020-07-23 PROCEDURE — 93880 EXTRACRANIAL BILAT STUDY: CPT

## 2020-07-24 ENCOUNTER — APPOINTMENT (OUTPATIENT)
Dept: NEUROLOGY | Facility: CLINIC | Age: 55
End: 2020-07-24
Payer: COMMERCIAL

## 2020-07-24 ENCOUNTER — APPOINTMENT (OUTPATIENT)
Dept: FAMILY MEDICINE | Facility: HOSPITAL | Age: 55
End: 2020-07-24

## 2020-07-24 PROCEDURE — 95816 EEG AWAKE AND DROWSY: CPT

## 2020-08-05 ENCOUNTER — APPOINTMENT (OUTPATIENT)
Dept: FAMILY MEDICINE | Facility: HOSPITAL | Age: 55
End: 2020-08-05

## 2020-08-05 DIAGNOSIS — N64.4 MASTODYNIA: ICD-10-CM

## 2020-08-12 ENCOUNTER — APPOINTMENT (OUTPATIENT)
Dept: CARDIOLOGY | Facility: CLINIC | Age: 55
End: 2020-08-12

## 2020-08-25 ENCOUNTER — APPOINTMENT (OUTPATIENT)
Dept: NEUROLOGY | Facility: CLINIC | Age: 55
End: 2020-08-25
Payer: COMMERCIAL

## 2020-08-25 VITALS
HEART RATE: 80 BPM | HEIGHT: 64 IN | WEIGHT: 167 LBS | BODY MASS INDEX: 28.51 KG/M2 | DIASTOLIC BLOOD PRESSURE: 80 MMHG | TEMPERATURE: 98 F | SYSTOLIC BLOOD PRESSURE: 170 MMHG

## 2020-08-25 PROCEDURE — 99214 OFFICE O/P EST MOD 30 MIN: CPT

## 2020-08-25 RX ORDER — CLOPIDOGREL BISULFATE 75 MG/1
75 TABLET, FILM COATED ORAL
Qty: 30 | Refills: 0 | Status: DISCONTINUED | COMMUNITY
Start: 2020-05-06 | End: 2020-08-25

## 2020-08-25 RX ORDER — CHLORTHALIDONE 50 MG/1
50 TABLET ORAL DAILY
Qty: 30 | Refills: 1 | Status: ACTIVE | COMMUNITY
Start: 2020-06-02 | End: 1900-01-01

## 2020-08-25 NOTE — ASSESSMENT
[FreeTextEntry1] : 55-year-old woman history of hypertension, hyperlipidemia,headaches are improved, not taking nortriptyline. Intermittent postural vertigo, likely benign positional vertigo.\par Reviewed recent imaging with the patient, carotid Dopplers, showed no carotid disease of the neck.\par Discuss importance of blood pressure control.\par Plan:In discontinue Plavix.\par Continue statin, aspirin, antihypertensives.\par Advice to obtain a primary care physician.We'll refer her to vestibular physical therapy.\par Return to office for 2 months.

## 2020-08-25 NOTE — DATA REVIEWED
[FreeTextEntry1] : EXAM:  MR BRAIN  \par \par \par PROCEDURE DATE:  04/20/2020  \par \par \par \par INTERPRETATION:  INDICATION:    Stroke\par TECHNIQUE:  Multiplanar brain imaging was conducted using a 1.5 Sagrario magnet.  T1, T2 and FLAIR imaging was performed.  In addition, diffusion imaging, diffusion coefficient assessment (ADC) and echo planar T2* was incorporated. No contrast administered  \par COMPARISON EXAMINATION:  CT 2/16/2020\par \par FINDINGS:\par HEMISPHERES:  There is an old left basal ganglia infarct. There are mild involutional changes. No acute abnormality is suggested. No diffusion restriction is noted. No microhemorrhage or mass is noted as well.\par VENTRICLES:  midline and normal in size\par POSTERIOR FOSSA:  The brain stem and cerebellum are unremarkable in appearance.  No CP angle abnormality is noted.\par EXTRA-AXIAL:  No mass or collections are depicted.\par CRANIAL NERVES:  No abnormality noted.\par VASCULATURE:  The major vessels and venous sinuses are grossly patent.  \par SINUSES AND MASTOIDS:  Clear.\par MISCELLANEOUS:  No orbital or pituitary abnormality noted.  No skull base lesion suggested.\par \par IMPRESSION:    \par 1)  old left basal ganglia infarct. Mild involutional changes, with no evidence of an acute abnormality or space occupying lesion..\par 2)  clear sinuses and mastoids.. \par \par \par  EXAM:  US DPLX CAROTIDS COMPL BI  \par \par \par PROCEDURE DATE:  07/23/2020  \par \par \par \par INTERPRETATION:  ULTRASOUND OF THE CAROTID ARTERIES\par \par CLINICAL INDICATION: CVA; evaluate for carotid stenosis.  \par \par TECHNIQUE:   Doppler ultrasonography of the carotid arteries was performed utilizing grayscale, color and spectral Doppler.   The magnitude of stenosis was estimated based on established tables of systolic peak velocity related to the magnitude of carotid stenosis.\par \par COMPARISON: No prior studies available for comparison.\par \par FINDINGS:\par No significant atherosclerotic disease or tortuosity of the carotid arteries.  No disturbed color-flow or elevated blood flow velocities are encountered. Antegrade flow is present in both vertebral arteries.\par \par Velocities expressed in centimeters per second are as follows:\par \par RIGHT:  Proximal CCA = 55.9; Distal CCA = 62.9; Proximal ICA = 32.2; Distal ICA = 50.3;  ECA = 87.9\par LEFT:    Proximal CCA = 50.5; Distal CCA = 80.1; Proximal ICA = 35.7; Distal ICA = 62.8;  ECA = 71.1\par \par Right peak systolic IC/CC velocity ratio: 0.9\par Left peak systolic IC/CC velocity ratio: 0.8\par \par IMPRESSION:   \par 1.  Right carotid system:  No hemodynamically significant stenosis is found.   \par 2.  Left carotid system:  No hemodynamically significant stenosis is found.  \par

## 2020-08-25 NOTE — HISTORY OF PRESENT ILLNESS
[FreeTextEntry1] : 55 year old woman here for f/u, hx of recurrent headaches. Admitted recently to  for headaches, right sided weakness. Imaging negative for CVA, + for atherosclerotic disease intracranially and left ICA. Recent carotid dopplers, were unremarkable.\par Started on Pamelor 25 mg HS, not clear if she takes it. Complains of recurrent vertigo, postural, brief.

## 2020-08-25 NOTE — PHYSICAL EXAM
[General Appearance - Alert] : alert [General Appearance - In No Acute Distress] : in no acute distress [Impaired Insight] : insight and judgment were intact [Oriented To Time, Place, And Person] : oriented to person, place, and time [Affect] : the affect was normal [Place] : oriented to place [Person] : oriented to person [Time] : oriented to time [Visual Intact] : visual attention was ~T not ~L decreased [Naming Objects] : no difficulty naming common objects [Fluency] : fluency intact [Comprehension] : comprehension intact [Cranial Nerves Optic (II)] : visual acuity intact bilaterally,  visual fields full to confrontation, pupils equal round and reactive to light [Past History] : adequate knowledge of personal past history [Cranial Nerves Oculomotor (III)] : extraocular motion intact [Cranial Nerves Facial (VII)] : face symmetrical [Cranial Nerves Vestibulocochlear (VIII)] : hearing was intact bilaterally [Cranial Nerves Trigeminal (V)] : facial sensation intact symmetrically [Cranial Nerves Glossopharyngeal (IX)] : tongue and palate midline [Cranial Nerves Accessory (XI - Cranial And Spinal)] : head turning and shoulder shrug symmetric [Motor Strength] : muscle strength was normal in all four extremities [Motor Tone] : muscle tone was normal in all four extremities [Cranial Nerves Hypoglossal (XII)] : there was no tongue deviation with protrusion [No Muscle Atrophy] : normal bulk in all four extremities [Paresis Pronator Drift Left-Sided] : no pronator drift on the left [Paresis Pronator Drift Right-Sided] : no pronator drift on the right [Motor Handedness Right-Handed] : the patient is right hand dominant [Sensation Tactile Decrease] : light touch was intact [Sensation Pain / Temperature Decrease] : pain and temperature was intact [Abnormal Walk] : normal gait [Tremor] : no tremor present [Past-pointing] : there was no past-pointing [Balance] : balance was intact [Dysdiadochokinesia Bilaterally] : not present [Coordination - Dysmetria Impaired Finger-to-Nose Bilateral] : not present [Plantar Reflex Right Only] : normal on the right [2+] : Ankle jerk left 2+ [Plantar Reflex Left Only] : normal on the left

## 2020-09-28 ENCOUNTER — APPOINTMENT (OUTPATIENT)
Dept: FAMILY MEDICINE | Facility: CLINIC | Age: 55
End: 2020-09-28

## 2020-10-22 ENCOUNTER — APPOINTMENT (OUTPATIENT)
Dept: NEUROLOGY | Facility: CLINIC | Age: 55
End: 2020-10-22
Payer: COMMERCIAL

## 2020-10-22 VITALS
WEIGHT: 167 LBS | TEMPERATURE: 97.9 F | SYSTOLIC BLOOD PRESSURE: 150 MMHG | HEIGHT: 64 IN | DIASTOLIC BLOOD PRESSURE: 78 MMHG | BODY MASS INDEX: 28.51 KG/M2 | HEART RATE: 74 BPM

## 2020-10-22 PROCEDURE — 99213 OFFICE O/P EST LOW 20 MIN: CPT

## 2020-10-22 PROCEDURE — 99072 ADDL SUPL MATRL&STAF TM PHE: CPT

## 2020-10-22 RX ORDER — NORTRIPTYLINE HYDROCHLORIDE 25 MG/1
25 CAPSULE ORAL
Qty: 60 | Refills: 3 | Status: DISCONTINUED | COMMUNITY
Start: 2020-07-17 | End: 2020-10-22

## 2020-10-22 NOTE — ASSESSMENT
[FreeTextEntry1] : 55-year-old woman with a history of dizziness, intermittent headaches, symptoms pretty much resolved.No evidence of stroke.\par Advised to continue taking statins and antihypertensive medication. Followup with primary care physicians.\par Maintain a headache diary.\par Return to the office client and months.

## 2020-10-22 NOTE — HISTORY OF PRESENT ILLNESS
[FreeTextEntry1] : 55-year-old right-handed woman history of hypertension, hyperlipidemia, here for followup visit, I complain of headaches, intermittentpostural dizziness, feeling much better. No headache, not dizzy. At times a little anxious especially when she tries to sleep, no panic attacks, or depression.

## 2020-10-22 NOTE — PHYSICAL EXAM
[General Appearance - Alert] : alert [General Appearance - In No Acute Distress] : in no acute distress [Oriented To Time, Place, And Person] : oriented to person, place, and time [Impaired Insight] : insight and judgment were intact [Affect] : the affect was normal [Person] : oriented to person [Place] : oriented to place [Time] : oriented to time [Visual Intact] : visual attention was ~T not ~L decreased [Naming Objects] : no difficulty naming common objects [Fluency] : fluency intact [Comprehension] : comprehension intact [Past History] : adequate knowledge of personal past history [Cranial Nerves Optic (II)] : visual acuity intact bilaterally,  visual fields full to confrontation, pupils equal round and reactive to light [Cranial Nerves Oculomotor (III)] : extraocular motion intact [Cranial Nerves Trigeminal (V)] : facial sensation intact symmetrically [Cranial Nerves Facial (VII)] : face symmetrical [Cranial Nerves Vestibulocochlear (VIII)] : hearing was intact bilaterally [Cranial Nerves Glossopharyngeal (IX)] : tongue and palate midline [Cranial Nerves Accessory (XI - Cranial And Spinal)] : head turning and shoulder shrug symmetric [Cranial Nerves Hypoglossal (XII)] : there was no tongue deviation with protrusion [Motor Tone] : muscle tone was normal in all four extremities [Motor Strength] : muscle strength was normal in all four extremities [No Muscle Atrophy] : normal bulk in all four extremities [Motor Handedness Right-Handed] : the patient is right hand dominant [Paresis Pronator Drift Right-Sided] : no pronator drift on the right [Paresis Pronator Drift Left-Sided] : no pronator drift on the left [Sensation Tactile Decrease] : light touch was intact [Sensation Pain / Temperature Decrease] : pain and temperature was intact [Abnormal Walk] : normal gait [Balance] : balance was intact [Past-pointing] : there was no past-pointing [Tremor] : no tremor present [Dysdiadochokinesia Bilaterally] : not present [Coordination - Dysmetria Impaired Finger-to-Nose Bilateral] : not present

## 2020-12-03 ENCOUNTER — RX RENEWAL (OUTPATIENT)
Age: 55
End: 2020-12-03

## 2021-01-21 ENCOUNTER — APPOINTMENT (OUTPATIENT)
Dept: NEUROLOGY | Facility: CLINIC | Age: 56
End: 2021-01-21

## 2021-01-26 ENCOUNTER — EMERGENCY (EMERGENCY)
Facility: HOSPITAL | Age: 56
LOS: 1 days | Discharge: LEFT BEFORE DISCHARGE | End: 2021-01-26
Attending: EMERGENCY MEDICINE | Admitting: INTERNAL MEDICINE
Payer: COMMERCIAL

## 2021-01-26 VITALS
DIASTOLIC BLOOD PRESSURE: 101 MMHG | TEMPERATURE: 98 F | HEIGHT: 61 IN | HEART RATE: 64 BPM | SYSTOLIC BLOOD PRESSURE: 221 MMHG | OXYGEN SATURATION: 99 % | WEIGHT: 162.92 LBS | RESPIRATION RATE: 16 BRPM

## 2021-01-26 VITALS — SYSTOLIC BLOOD PRESSURE: 177 MMHG | DIASTOLIC BLOOD PRESSURE: 83 MMHG

## 2021-01-26 DIAGNOSIS — Z90.49 ACQUIRED ABSENCE OF OTHER SPECIFIED PARTS OF DIGESTIVE TRACT: Chronic | ICD-10-CM

## 2021-01-26 DIAGNOSIS — Z98.891 HISTORY OF UTERINE SCAR FROM PREVIOUS SURGERY: Chronic | ICD-10-CM

## 2021-01-26 LAB
ALBUMIN SERPL ELPH-MCNC: 3.6 G/DL — SIGNIFICANT CHANGE UP (ref 3.3–5)
ALP SERPL-CCNC: 137 U/L — HIGH (ref 40–120)
ALT FLD-CCNC: 31 U/L — SIGNIFICANT CHANGE UP (ref 10–45)
ANION GAP SERPL CALC-SCNC: 7 MMOL/L — SIGNIFICANT CHANGE UP (ref 5–17)
AST SERPL-CCNC: 17 U/L — SIGNIFICANT CHANGE UP (ref 10–40)
BASOPHILS # BLD AUTO: 0.06 K/UL — SIGNIFICANT CHANGE UP (ref 0–0.2)
BASOPHILS NFR BLD AUTO: 0.7 % — SIGNIFICANT CHANGE UP (ref 0–2)
BILIRUB SERPL-MCNC: 0.5 MG/DL — SIGNIFICANT CHANGE UP (ref 0.2–1.2)
BUN SERPL-MCNC: 15 MG/DL — SIGNIFICANT CHANGE UP (ref 7–23)
CALCIUM SERPL-MCNC: 8.5 MG/DL — SIGNIFICANT CHANGE UP (ref 8.4–10.5)
CHLORIDE SERPL-SCNC: 102 MMOL/L — SIGNIFICANT CHANGE UP (ref 96–108)
CO2 SERPL-SCNC: 29 MMOL/L — SIGNIFICANT CHANGE UP (ref 22–31)
CREAT SERPL-MCNC: 0.73 MG/DL — SIGNIFICANT CHANGE UP (ref 0.5–1.3)
EOSINOPHIL # BLD AUTO: 0.3 K/UL — SIGNIFICANT CHANGE UP (ref 0–0.5)
EOSINOPHIL NFR BLD AUTO: 3.6 % — SIGNIFICANT CHANGE UP (ref 0–6)
GLUCOSE SERPL-MCNC: 96 MG/DL — SIGNIFICANT CHANGE UP (ref 70–99)
HCT VFR BLD CALC: 42.6 % — SIGNIFICANT CHANGE UP (ref 34.5–45)
HGB BLD-MCNC: 14.3 G/DL — SIGNIFICANT CHANGE UP (ref 11.5–15.5)
IMM GRANULOCYTES NFR BLD AUTO: 0.2 % — SIGNIFICANT CHANGE UP (ref 0–1.5)
LIDOCAIN IGE QN: 116 U/L — SIGNIFICANT CHANGE UP (ref 73–393)
LYMPHOCYTES # BLD AUTO: 2.42 K/UL — SIGNIFICANT CHANGE UP (ref 1–3.3)
LYMPHOCYTES # BLD AUTO: 29 % — SIGNIFICANT CHANGE UP (ref 13–44)
MCHC RBC-ENTMCNC: 28.5 PG — SIGNIFICANT CHANGE UP (ref 27–34)
MCHC RBC-ENTMCNC: 33.6 GM/DL — SIGNIFICANT CHANGE UP (ref 32–36)
MCV RBC AUTO: 84.9 FL — SIGNIFICANT CHANGE UP (ref 80–100)
MONOCYTES # BLD AUTO: 0.68 K/UL — SIGNIFICANT CHANGE UP (ref 0–0.9)
MONOCYTES NFR BLD AUTO: 8.1 % — SIGNIFICANT CHANGE UP (ref 2–14)
NEUTROPHILS # BLD AUTO: 4.87 K/UL — SIGNIFICANT CHANGE UP (ref 1.8–7.4)
NEUTROPHILS NFR BLD AUTO: 58.4 % — SIGNIFICANT CHANGE UP (ref 43–77)
NRBC # BLD: 0 /100 WBCS — SIGNIFICANT CHANGE UP (ref 0–0)
PLATELET # BLD AUTO: 343 K/UL — SIGNIFICANT CHANGE UP (ref 150–400)
POTASSIUM SERPL-MCNC: 3.5 MMOL/L — SIGNIFICANT CHANGE UP (ref 3.5–5.3)
POTASSIUM SERPL-SCNC: 3.5 MMOL/L — SIGNIFICANT CHANGE UP (ref 3.5–5.3)
PROT SERPL-MCNC: 7.6 G/DL — SIGNIFICANT CHANGE UP (ref 6–8.3)
RBC # BLD: 5.02 M/UL — SIGNIFICANT CHANGE UP (ref 3.8–5.2)
RBC # FLD: 12.2 % — SIGNIFICANT CHANGE UP (ref 10.3–14.5)
SARS-COV-2 RNA SPEC QL NAA+PROBE: SIGNIFICANT CHANGE UP
SODIUM SERPL-SCNC: 138 MMOL/L — SIGNIFICANT CHANGE UP (ref 135–145)
TROPONIN I SERPL-MCNC: <.017 NG/ML — LOW (ref 0.02–0.06)
WBC # BLD: 8.35 K/UL — SIGNIFICANT CHANGE UP (ref 3.8–10.5)
WBC # FLD AUTO: 8.35 K/UL — SIGNIFICANT CHANGE UP (ref 3.8–10.5)

## 2021-01-26 PROCEDURE — 93010 ELECTROCARDIOGRAM REPORT: CPT

## 2021-01-26 PROCEDURE — 71045 X-RAY EXAM CHEST 1 VIEW: CPT

## 2021-01-26 PROCEDURE — 93005 ELECTROCARDIOGRAM TRACING: CPT

## 2021-01-26 PROCEDURE — U0003: CPT

## 2021-01-26 PROCEDURE — 96374 THER/PROPH/DIAG INJ IV PUSH: CPT

## 2021-01-26 PROCEDURE — 80053 COMPREHEN METABOLIC PANEL: CPT

## 2021-01-26 PROCEDURE — 99285 EMERGENCY DEPT VISIT HI MDM: CPT

## 2021-01-26 PROCEDURE — 99284 EMERGENCY DEPT VISIT MOD MDM: CPT | Mod: 25

## 2021-01-26 PROCEDURE — U0005: CPT

## 2021-01-26 PROCEDURE — 83690 ASSAY OF LIPASE: CPT

## 2021-01-26 PROCEDURE — 85025 COMPLETE CBC W/AUTO DIFF WBC: CPT

## 2021-01-26 PROCEDURE — 71045 X-RAY EXAM CHEST 1 VIEW: CPT | Mod: 26

## 2021-01-26 PROCEDURE — 36415 COLL VENOUS BLD VENIPUNCTURE: CPT

## 2021-01-26 PROCEDURE — 84484 ASSAY OF TROPONIN QUANT: CPT

## 2021-01-26 RX ORDER — ASCORBIC ACID 60 MG
1 TABLET,CHEWABLE ORAL
Qty: 0 | Refills: 0 | DISCHARGE

## 2021-01-26 RX ORDER — HYDRALAZINE HCL 50 MG
10 TABLET ORAL ONCE
Refills: 0 | Status: COMPLETED | OUTPATIENT
Start: 2021-01-26 | End: 2021-01-26

## 2021-01-26 RX ORDER — ASPIRIN/CALCIUM CARB/MAGNESIUM 324 MG
325 TABLET ORAL ONCE
Refills: 0 | Status: COMPLETED | OUTPATIENT
Start: 2021-01-26 | End: 2021-01-26

## 2021-01-26 RX ADMIN — Medication 325 MILLIGRAM(S): at 21:23

## 2021-01-26 RX ADMIN — Medication 10 MILLIGRAM(S): at 20:10

## 2021-01-26 NOTE — ED PROVIDER NOTE - OBJECTIVE STATEMENT
Pt is 55 female with PMhx of HLD, TIA, BPV here for eval of elevated BP and chest pressure since 2pm today. As per pt she went to see her PCP Dr Adorno and was noted to be hypertensive in the office and was sent to ER for eval. Pt states that she had midsternal CP which has since resolved. BP upon arrival 221/101, pt took Losartan 50mg today and states she has been taking religiously. Denies HA, dizziness, visual changes, denies ext weakness ro numbness, denies recent travel/prolonged immobilization, personal/family hx of coagulopathies, non smoker, not on exogenous estrogen. denies shortness of breath, cough, fever, nausea, jaw pain or LUE pain.

## 2021-01-26 NOTE — ED PROVIDER NOTE - ATTENDING CONTRIBUTION TO CARE
[facility], c/o high bp today. states BP 200s/120.  assoc c mid sternal chest pain, pressure about 3pm, lasted ~2 hrs, resolved. no sob, dizziness. no weakness, numbness, speech or vision changes. no n/v. no illegal drug use. pt compliant with her bp meds    exam:   General: well appearing, NAD.   HEENT: eyes perrl,   cor: RRR, s1s2, 2+rad pulses.   lungs: ctabl, no resp distress.   abd: soft, ntnd.   neuro: a&ox3, cn2-12 intact, DORSEY, 5/5 strength c nl sensation all extremities, nl coordination.   MSK: no extremity swelling.  Skin: normal, no rash    AP: pt with chest pain and high BP today. No STEMI on EKG. will check labs, trops. concern for hypertensive emergency, ACS.  no s/s of stroke.  iv antihypertensive if BP does not improve. will need admission/obs

## 2021-01-26 NOTE — ED ADULT NURSE NOTE - OBJECTIVE STATEMENT
Patient presents to ED c/o midsternal chest pain with high blood pressure. Patient denies SOB, denies N/V. Sent by PMD to rule out MI.

## 2021-01-26 NOTE — ED PROVIDER NOTE - PATIENT PORTAL LINK FT
You can access the FollowMyHealth Patient Portal offered by [facility] by registering at the following website: [email or web address]. By joining [facility]’s FollowMyHealth portal, you will also be able to view your health information using other applications (apps) compatible with our system.

## 2021-01-26 NOTE — ED PROVIDER NOTE - NSFOLLOWUPINSTRUCTIONS_ED_ALL_ED_FT
PLEASE BE ADVISED THAT YOU LEAVING THE HOSPITAL AGAINST MEDICAL ADVISE, YOU EVALUATION IS NOT COMPLETE. YOU MAY RETURN TO ER AT ANY TIME TO FINISH IT;   Hypertension    WHAT YOU NEED TO KNOW:    Hypertension is high blood pressure. Your blood pressure is the force of your blood moving against the walls of your arteries. Hypertension causes your blood pressure to get so high that your heart has to work much harder than normal. This can damage your heart. The cause of hypertension may not be known. This is called essential or primary hypertension. Hypertension caused by another medical condition, such as kidney disease, is called secondary hypertension.    DISCHARGE INSTRUCTIONS:    Call 911 for any of the following:   •You have chest pain.      •You have any of the following signs of a heart attack: ?Squeezing, pressure, or pain in your chest      ?You may also have any of the following: ?Discomfort or pain in your back, neck, jaw, stomach, or arm      ?Shortness of breath      ?Nausea or vomiting      ?Lightheadedness or a sudden cold sweat        •You become confused or have difficulty speaking.      •You suddenly feel lightheaded or have trouble breathing.      Return to the emergency department if:   •You have a severe headache or vision loss.      •You have weakness in an arm or leg.       Contact your healthcare provider if:   •You feel faint, dizzy, confused, or drowsy.       •You have been taking your blood pressure medicine but your pressure is higher than your provider says it should be.      •You have questions or concerns about your condition or care.      Medicines: You may need any of the following:   •Antihypertensives may be used to help lower your blood pressure. Several kinds of medicines are available. Your healthcare provider will choose medicines based on the kind of hypertension you have. You may need more than one type of medicine. Take the medicine exactly as directed.       •Diuretics help decrease extra fluid that collects in your body. This will help lower your blood pressure. You may urinate more often while you take this medicine.      •Cholesterol medicine helps lower your cholesterol level. A low cholesterol level helps prevent heart disease and makes it easier to control your blood pressure.       •Take your medicine as directed. Contact your healthcare provider if you think your medicine is not helping or if you have side effects. Tell him or her if you are allergic to any medicine. Keep a list of the medicines, vitamins, and herbs you take. Include the amounts, and when and why you take them. Bring the list or the pill bottles to follow-up visits. Carry your medicine list with you in case of an emergency.      Follow up with your healthcare provider as directed: You will need to return to have your blood pressure checked and to have other lab tests done. Write down your questions so you remember to ask them during your visits.     Stages of hypertension:     Blood Pressure Readings     •Normal blood pressure is 119/79 or lower. Your healthcare provider may only check your blood pressure each year if it stays at a normal level.      •Elevated blood pressure is 120/79 to 129/79. This is sometimes called prehypertension. Your healthcare provider may suggest lifestyle changes to help lower your blood pressure to a normal level. He or she may then check it again in 3 to 6 months.      •Stage 1 hypertension is 130/80 to 139/89. Your provider may recommend lifestyle changes, medication, and checks every 3 to 6 months until your blood pressure is controlled.      •Stage 2 hypertension is 140/90 or higher. Your provider will recommend lifestyle changes and have you take 2 kinds of hypertension medicines. You will also need to have your blood pressure checked monthly until it is controlled.      Manage hypertension:   •Check your blood pressure at home. Avoid smoking, caffeine, and exercise at least 30 minutes before checking your blood pressure. Sit and rest for 5 minutes before you take your blood pressure. Extend your arm and support it on a flat surface. Your arm should be at the same level as your heart. Follow the directions that came with your blood pressure monitor. Check your blood pressure 2 times, 1 minute apart, before you take your medicine in the morning. Also check your blood pressure before your evening meal. Keep a record of your readings and bring it to your follow-up visits. Ask your healthcare provider what your blood pressure should be.   How to take a Blood Pressure           •Manage any other health conditions you have. Health conditions such as diabetes can increase your risk for hypertension. Follow your healthcare provider's instructions and take all your medicines as directed.       •Ask about all medicines. Certain medicines can increase your blood pressure. Examples include oral birth control pills, decongestants, herbal supplements, and NSAIDs, such as ibuprofen. Your healthcare provider can tell you which medicines are safe for you to take. This includes prescription and over-the-counter medicines.      Lifestyle changes you can make to manage hypertension:   •Limit sodium (salt) as directed. Too much sodium can affect your fluid balance. Check labels to find low-sodium or no-salt-added foods. Some low-sodium foods use potassium salts for flavor. Too much potassium can also cause health problems. Your healthcare provider will tell you how much sodium and potassium are safe for you to have in a day. He or she may recommend that you limit sodium to 2,300 mg a day.             •Follow the meal plan recommended by your healthcare provider. A dietitian or your provider can give you more information on low-sodium plans or the DASH (Dietary Approaches to Stop Hypertension) eating plan. The DASH plan is low in sodium, unhealthy fats, and total fat. It is high in potassium, calcium, and fiber.              •Exercise to maintain a healthy weight. Exercise at least 30 minutes per day, on most days of the week. This will help decrease your blood pressure. Ask your healthcare provider about the best exercise plan for you.   Walking for Exercise           •Decrease stress. This may help lower your blood pressure. Learn ways to relax, such as deep breathing or listening to music.      •Limit alcohol as directed. Alcohol can increase your blood pressure. A drink of alcohol is 12 ounces of beer, 5 ounces of wine, or 1½ ounces of liquor.      •Do not smoke. Nicotine and other chemicals in cigarettes and cigars can increase your blood pressure and also cause lung damage. Ask your healthcare provider for information if you currently smoke and need help to quit. E-cigarettes or smokeless tobacco still contain nicotine. Talk to your healthcare provider before you use these products.   Prevent Heart Disease

## 2021-01-26 NOTE — ED PROVIDER NOTE - PROGRESS NOTE DETAILS
Nicko hall:  Pt wishes to sign out ama; The pt is clinically sober, AA&Ox3, free from distracting injury.  Throughout our interactions in the ED today, the pt has demonstrated concrete thinking/reasoning, has maintained an orderly/reasonable conversation, appears to have intact insight/judgment/reason and therefore in our opinion has capacity to make decisions.  Given the pt’s presentation, we communicated our concern for hypertensive urgency/emergency.    The pt verbalized an understanding of our worries. We’ve told the patient that the ED evaluation is incomplete & many troublesome and she is in risk for stroke, MI and death.  We have discussed the range of possible dx, potential testing & tx options.  We’ve made  numerous efforts to prevent the pt from leaving AMA.  Our discussions included the potential outcomes of leaving AMA, including worsening of their condition, becoming permanently disabled/in pain/critically ill, or death.  Despite these efforts, we were unable to convince the pt to stay.  The pt is refusing any  further care and states that she is concerned she will get coronavirus and is leaving against medical advice. We have attempted to offer tx/rx/guidance for any dangerous conditions which are most likely and/or dangerous.  We have answered all questions and have implored the pt to return ASAP to complete the w/u.  A staff member witnessed the patient consenting to AMA.

## 2021-01-26 NOTE — ED PROVIDER NOTE - CLINICAL SUMMARY MEDICAL DECISION MAKING FREE TEXT BOX
Pt is 55 female with PMhx of HLD, TIA, BPV here for eval of elevated BP and chest pressure since 2pm today. As per pt she went to see her PCP Dr Adorno and was noted to be hypertensive in the office and was sent to ER for eval. Pt states that she had midsternal CP which has since resolved. BP upon arrival 221/101, pt took Losartan 50mg today and states she has been taking religiously. Denies HA, dizziness, visual changes, denies ext weakness ro numbness, denies recent travel/prolonged immobilization, personal/family hx of coagulopathies, non smoker, not on exogenous estrogen. denies shortness of breath, cough, fever, nausea, jaw pain or LUE pain. Pt noted to be hypertensive, no ekg changes, trop negative, BP controlled, will obs for ACS as pt is high risk. Pt is 55 female with PMhx of HLD, TIA, BPV here for eval of elevated BP and chest pressure since 2pm today. As per pt she went to see her PCP Dr Adorno and was noted to be hypertensive in the office and was sent to ER for eval. Pt states that she had midsternal CP which has since resolved. BP upon arrival 221/101, pt took Losartan 50mg today and states she has been taking religiously. Denies HA, dizziness, visual changes, denies ext weakness ro numbness, denies recent travel/prolonged immobilization, personal/family hx of coagulopathies, non smoker, not on exogenous estrogen. denies shortness of breath, cough, fever, nausea, jaw pain or LUE pain. Pt noted to be hypertensive, no ekg changes, trop negative, BP controlled, will obs for ACS as pt is high risk.      dr tobin:  pt wants to leave AMA. does not want to be admitted due to fears of COVID.  I tried to alleviate her fears by telling her that we test all admitted pts and that covid pts are on separate wards. I d/w pt r/b/a of leaving ama, and risks of MI/ACS which I explained can be deadly or permanently disabling. pt understands risk/B/A. does not want to stay any longer.  pt A&O x3, is coherent, has capacity. told her to f/u asap c pmd or can return any time if she changes her mind or for other sxs

## 2021-01-27 ENCOUNTER — NON-APPOINTMENT (OUTPATIENT)
Age: 56
End: 2021-01-27

## 2021-02-26 NOTE — ED PROVIDER NOTE - NIH STROKE SCALE 1C. LOC COMMAND
[Follow-Up Visit] : a follow-up visit for [Acute Lymphoblastic Leukemia] : acute lymphoblastic leukemia [Patient] : patient [Father] : father [FreeTextEntry2] : VHR B cell ALL following protocol [id number] (0) Performs both tasks correctly

## 2021-03-09 ENCOUNTER — APPOINTMENT (OUTPATIENT)
Dept: NEUROLOGY | Facility: CLINIC | Age: 56
End: 2021-03-09

## 2021-03-18 ENCOUNTER — OUTPATIENT (OUTPATIENT)
Dept: OUTPATIENT SERVICES | Facility: HOSPITAL | Age: 56
LOS: 1 days | End: 2021-03-18
Payer: COMMERCIAL

## 2021-03-18 ENCOUNTER — APPOINTMENT (OUTPATIENT)
Dept: MAMMOGRAPHY | Facility: HOSPITAL | Age: 56
End: 2021-03-18
Payer: COMMERCIAL

## 2021-03-18 DIAGNOSIS — Z00.8 ENCOUNTER FOR OTHER GENERAL EXAMINATION: ICD-10-CM

## 2021-03-18 DIAGNOSIS — Z98.891 HISTORY OF UTERINE SCAR FROM PREVIOUS SURGERY: Chronic | ICD-10-CM

## 2021-03-18 DIAGNOSIS — Z90.49 ACQUIRED ABSENCE OF OTHER SPECIFIED PARTS OF DIGESTIVE TRACT: Chronic | ICD-10-CM

## 2021-03-18 PROCEDURE — 77067 SCR MAMMO BI INCL CAD: CPT | Mod: 26

## 2021-03-18 PROCEDURE — 77063 BREAST TOMOSYNTHESIS BI: CPT | Mod: 26

## 2021-03-18 PROCEDURE — 77063 BREAST TOMOSYNTHESIS BI: CPT

## 2021-03-18 PROCEDURE — 77067 SCR MAMMO BI INCL CAD: CPT

## 2021-04-12 NOTE — H&P ADULT - BIRTH SEX
"Pt seen at urgent care last Thursday for scratch to OS. Pt was given unknown antibiotic drops and ""numbing' drops. Pt reports used numbing drops very sparingly and hasn't needed them in a while. " Female

## 2021-04-29 ENCOUNTER — NON-APPOINTMENT (OUTPATIENT)
Age: 56
End: 2021-04-29

## 2021-04-29 ENCOUNTER — EMERGENCY (EMERGENCY)
Facility: HOSPITAL | Age: 56
LOS: 1 days | Discharge: HOME | End: 2021-04-29
Attending: EMERGENCY MEDICINE | Admitting: EMERGENCY MEDICINE
Payer: COMMERCIAL

## 2021-04-29 VITALS
HEIGHT: 61 IN | TEMPERATURE: 100 F | DIASTOLIC BLOOD PRESSURE: 125 MMHG | RESPIRATION RATE: 18 BRPM | HEART RATE: 92 BPM | WEIGHT: 162.92 LBS | SYSTOLIC BLOOD PRESSURE: 174 MMHG | OXYGEN SATURATION: 99 %

## 2021-04-29 VITALS
OXYGEN SATURATION: 98 % | DIASTOLIC BLOOD PRESSURE: 79 MMHG | SYSTOLIC BLOOD PRESSURE: 160 MMHG | RESPIRATION RATE: 20 BRPM | TEMPERATURE: 98 F | HEART RATE: 86 BPM

## 2021-04-29 DIAGNOSIS — Z90.49 ACQUIRED ABSENCE OF OTHER SPECIFIED PARTS OF DIGESTIVE TRACT: Chronic | ICD-10-CM

## 2021-04-29 DIAGNOSIS — Z98.891 HISTORY OF UTERINE SCAR FROM PREVIOUS SURGERY: Chronic | ICD-10-CM

## 2021-04-29 LAB
ALBUMIN SERPL ELPH-MCNC: 3.5 G/DL — SIGNIFICANT CHANGE UP (ref 3.3–5)
ALP SERPL-CCNC: 159 U/L — HIGH (ref 40–120)
ALT FLD-CCNC: 42 U/L — SIGNIFICANT CHANGE UP (ref 10–45)
ANION GAP SERPL CALC-SCNC: 11 MMOL/L — SIGNIFICANT CHANGE UP (ref 5–17)
AST SERPL-CCNC: 19 U/L — SIGNIFICANT CHANGE UP (ref 10–40)
BASOPHILS # BLD AUTO: 0.04 K/UL — SIGNIFICANT CHANGE UP (ref 0–0.2)
BASOPHILS NFR BLD AUTO: 0.5 % — SIGNIFICANT CHANGE UP (ref 0–2)
BILIRUB SERPL-MCNC: 0.5 MG/DL — SIGNIFICANT CHANGE UP (ref 0.2–1.2)
BUN SERPL-MCNC: 17 MG/DL — SIGNIFICANT CHANGE UP (ref 7–23)
CALCIUM SERPL-MCNC: 8.7 MG/DL — SIGNIFICANT CHANGE UP (ref 8.4–10.5)
CHLORIDE SERPL-SCNC: 103 MMOL/L — SIGNIFICANT CHANGE UP (ref 96–108)
CO2 SERPL-SCNC: 24 MMOL/L — SIGNIFICANT CHANGE UP (ref 22–31)
CREAT SERPL-MCNC: 0.88 MG/DL — SIGNIFICANT CHANGE UP (ref 0.5–1.3)
EOSINOPHIL # BLD AUTO: 0.27 K/UL — SIGNIFICANT CHANGE UP (ref 0–0.5)
EOSINOPHIL NFR BLD AUTO: 3.2 % — SIGNIFICANT CHANGE UP (ref 0–6)
GLUCOSE SERPL-MCNC: 140 MG/DL — HIGH (ref 70–99)
HCT VFR BLD CALC: 43.1 % — SIGNIFICANT CHANGE UP (ref 34.5–45)
HGB BLD-MCNC: 14.5 G/DL — SIGNIFICANT CHANGE UP (ref 11.5–15.5)
IMM GRANULOCYTES NFR BLD AUTO: 0.4 % — SIGNIFICANT CHANGE UP (ref 0–1.5)
LYMPHOCYTES # BLD AUTO: 1 K/UL — SIGNIFICANT CHANGE UP (ref 1–3.3)
LYMPHOCYTES # BLD AUTO: 11.8 % — LOW (ref 13–44)
MCHC RBC-ENTMCNC: 28.4 PG — SIGNIFICANT CHANGE UP (ref 27–34)
MCHC RBC-ENTMCNC: 33.6 GM/DL — SIGNIFICANT CHANGE UP (ref 32–36)
MCV RBC AUTO: 84.3 FL — SIGNIFICANT CHANGE UP (ref 80–100)
MONOCYTES # BLD AUTO: 0.83 K/UL — SIGNIFICANT CHANGE UP (ref 0–0.9)
MONOCYTES NFR BLD AUTO: 9.8 % — SIGNIFICANT CHANGE UP (ref 2–14)
NEUTROPHILS # BLD AUTO: 6.32 K/UL — SIGNIFICANT CHANGE UP (ref 1.8–7.4)
NEUTROPHILS NFR BLD AUTO: 74.3 % — SIGNIFICANT CHANGE UP (ref 43–77)
NRBC # BLD: 0 /100 WBCS — SIGNIFICANT CHANGE UP (ref 0–0)
PLATELET # BLD AUTO: 330 K/UL — SIGNIFICANT CHANGE UP (ref 150–400)
POTASSIUM SERPL-MCNC: 3.6 MMOL/L — SIGNIFICANT CHANGE UP (ref 3.5–5.3)
POTASSIUM SERPL-SCNC: 3.6 MMOL/L — SIGNIFICANT CHANGE UP (ref 3.5–5.3)
PROT SERPL-MCNC: 7.9 G/DL — SIGNIFICANT CHANGE UP (ref 6–8.3)
RBC # BLD: 5.11 M/UL — SIGNIFICANT CHANGE UP (ref 3.8–5.2)
RBC # FLD: 13.2 % — SIGNIFICANT CHANGE UP (ref 10.3–14.5)
SODIUM SERPL-SCNC: 138 MMOL/L — SIGNIFICANT CHANGE UP (ref 135–145)
WBC # BLD: 8.49 K/UL — SIGNIFICANT CHANGE UP (ref 3.8–10.5)
WBC # FLD AUTO: 8.49 K/UL — SIGNIFICANT CHANGE UP (ref 3.8–10.5)

## 2021-04-29 PROCEDURE — 93005 ELECTROCARDIOGRAM TRACING: CPT

## 2021-04-29 PROCEDURE — 36415 COLL VENOUS BLD VENIPUNCTURE: CPT

## 2021-04-29 PROCEDURE — 80053 COMPREHEN METABOLIC PANEL: CPT

## 2021-04-29 PROCEDURE — 96374 THER/PROPH/DIAG INJ IV PUSH: CPT

## 2021-04-29 PROCEDURE — 93010 ELECTROCARDIOGRAM REPORT: CPT

## 2021-04-29 PROCEDURE — 99284 EMERGENCY DEPT VISIT MOD MDM: CPT | Mod: 25

## 2021-04-29 PROCEDURE — 71045 X-RAY EXAM CHEST 1 VIEW: CPT | Mod: 26

## 2021-04-29 PROCEDURE — 99284 EMERGENCY DEPT VISIT MOD MDM: CPT

## 2021-04-29 PROCEDURE — 71045 X-RAY EXAM CHEST 1 VIEW: CPT

## 2021-04-29 PROCEDURE — 85025 COMPLETE CBC W/AUTO DIFF WBC: CPT

## 2021-04-29 RX ORDER — KETOROLAC TROMETHAMINE 30 MG/ML
15 SYRINGE (ML) INJECTION ONCE
Refills: 0 | Status: DISCONTINUED | OUTPATIENT
Start: 2021-04-29 | End: 2021-04-29

## 2021-04-29 RX ORDER — SODIUM CHLORIDE 9 MG/ML
1000 INJECTION INTRAMUSCULAR; INTRAVENOUS; SUBCUTANEOUS ONCE
Refills: 0 | Status: COMPLETED | OUTPATIENT
Start: 2021-04-29 | End: 2021-04-29

## 2021-04-29 RX ADMIN — Medication 15 MILLIGRAM(S): at 01:42

## 2021-04-29 RX ADMIN — SODIUM CHLORIDE 2000 MILLILITER(S): 9 INJECTION INTRAMUSCULAR; INTRAVENOUS; SUBCUTANEOUS at 01:20

## 2021-04-29 RX ADMIN — Medication 15 MILLIGRAM(S): at 01:27

## 2021-04-29 RX ADMIN — SODIUM CHLORIDE 1000 MILLILITER(S): 9 INJECTION INTRAMUSCULAR; INTRAVENOUS; SUBCUTANEOUS at 02:05

## 2021-04-29 NOTE — ED ADULT TRIAGE NOTE - CHIEF COMPLAINT QUOTE
pt came in from home for SOB, fever, nausea , dry throat since receiving her first dose of the covid vaccine yesterday.

## 2021-04-29 NOTE — ED PROVIDER NOTE - NSFOLLOWUPINSTRUCTIONS_ED_ALL_ED_FT
your symptoms are common side effect of COVID vaccine  take Tylenol if needed for pain or fever  drink lots of liquids  follow up with your PMD if needed

## 2021-04-29 NOTE — ED ADULT NURSE NOTE - OBJECTIVE STATEMENT
Pt states she has mild throat pain with dry mouth. Pt states she received the first shot of the covid vaccine. Pt states she has mild throat pain with dry mouth. Pt reports body aches and chills. Pt states she received the first shot of the covid vaccine yesterday. Pt states she has mild throat pain with dry mouth. Pt reports body aches and chills. Pt states she received the first shot of the covid vaccine yesterday.    Pt states she forgot to take her blood pressure medicine today. (BP elevated upon arrival to ED)

## 2021-04-29 NOTE — ED PROVIDER NOTE - PATIENT PORTAL LINK FT
You can access the FollowMyHealth Patient Portal offered by [facility] by registering at the following website: [email or web address]. By joining [employer]’s FollowMyHealth portal, you will also be able to view your health information using other applications (apps) compatible with our system.

## 2021-04-29 NOTE — ED PROVIDER NOTE - OBJECTIVE STATEMENT
54 y/o F with h/o HTN presents to Ed c/o generalized body ache, SOB , sore throat, chills since she received COVID vaccine yesterday, no fever

## 2021-04-29 NOTE — ED PROVIDER NOTE - CLINICAL SUMMARY MEDICAL DECISION MAKING FREE TEXT BOX
pt presented with body ache, sore throat, and SOb after receiving COVID vaccine yesterday, had normal exam , normal vital signs, normal EKG and cxr , pt was explained that these are common side effects fo COVID vaccine and normally last one or two days. Should take Tylenol if needed . stay hydrated and f/u with PMD if needed

## 2021-05-27 RX ORDER — LOSARTAN POTASSIUM 100 MG/1
100 TABLET, FILM COATED ORAL DAILY
Qty: 30 | Refills: 0 | Status: ACTIVE | COMMUNITY
Start: 2020-06-02 | End: 1900-01-01

## 2021-06-09 NOTE — ED PROVIDER NOTE - NSCAREINITIATED _GEN_ER
Daily Note     Today's date: 6/10/2021  Patient name: Croey Allred  : 1977  MRN: [id number]  Referring provider: GABRIELA Contreras  Dx:   Encounter Diagnosis     ICD-10-CM    1  COVID-19 virus detected  U07 1    2  Weakness of both lower extremities  R29 898    3   Decreased functional mobility and endurance  Z74 09                   Subjective: See RE    Objective: See treatment diary below      Assessment: See RE      Plan: See RE         Precautions: falls, monitor vitals  Discharge today    Date 6/10       Visit Count 16       FOTO        Pain In 0       Pain Out 0           Manuals                                        Neuro Re-Ed        Star Drill        Natus        Core stability        Multidirectional board 10 mins       VOR Near        VOR Far        Imaginary Target        Eyes-Head        Dynamic gait activities 15 mins       Co-treat with ST        Ther Ex        Aerobic Nustep  Warm up   5 mins  L2   Base pace 3 mins  L5  Recovery  L2  2 mins  Base pace  5 mins  L5    Cool down  2 mins  L2             SLR x 4        HR/TR        Mini Squats        Knee flex/ext mach 33#  2x10       Leg Press 70#  2x10       Prayer stretch        LTR        Ther Activity                        Gait Training                        Modalities Lynn Mejia)

## 2021-06-23 ENCOUNTER — RX RENEWAL (OUTPATIENT)
Age: 56
End: 2021-06-23

## 2021-07-19 ENCOUNTER — RX CHANGE (OUTPATIENT)
Age: 56
End: 2021-07-19

## 2021-08-16 ENCOUNTER — OUTPATIENT (OUTPATIENT)
Dept: OUTPATIENT SERVICES | Facility: HOSPITAL | Age: 56
LOS: 1 days | End: 2021-08-16
Payer: COMMERCIAL

## 2021-08-16 DIAGNOSIS — Z90.49 ACQUIRED ABSENCE OF OTHER SPECIFIED PARTS OF DIGESTIVE TRACT: Chronic | ICD-10-CM

## 2021-08-16 DIAGNOSIS — R11.0 NAUSEA: ICD-10-CM

## 2021-08-16 DIAGNOSIS — Z98.891 HISTORY OF UTERINE SCAR FROM PREVIOUS SURGERY: Chronic | ICD-10-CM

## 2021-08-16 PROCEDURE — 93971 EXTREMITY STUDY: CPT

## 2021-08-16 PROCEDURE — 93971 EXTREMITY STUDY: CPT | Mod: 26,RT

## 2021-09-05 NOTE — ED PROVIDER NOTE - CONSULTANT FREE TEXT FOR MDM DISCUSSED CASE WITH QUESTION
Group Topic: BH Therapeutic Activity    Date: 9/5/2021  Start Time: 0245  End Time: 0330  Facilitators: Nalini Cool LPC; Selena Davis    Focus: [facility]  Number in attendance: 11    Group was held in the [facility], where clients engaged in relaxation, socialization, and therapeutic recreation.   Nalini Cool LPC    Pt was recruited for group but did not attend. Efforts to encourage participation in programming on the unit will continue.       telestroke Dr. Libman. He states if patients last normal was 2pm yesterday, she is out of the TPA. Recommends checking CTA head and neck for GERA and call him back

## 2021-12-27 NOTE — ED PROVIDER NOTE - CPE EDP RESP NORM
EXAM note      History    Phyllis Lu is a 42 year old female who presents with   Chief Complaint   Patient presents with   • fatigue     rm 2        I have reviewed the past medical, family and social history sections including the medications and allergies listed in the above medical record as well as the nursing notes.     HPI:  2 day history of headache, nasal congestion, and low-grade fever.  Has not received COVID vaccines, denies loss of taste or smell.  Has taken 400 mg of ibuprofen without much help.    Review of systems    Review of systems as per HPI.        Physical Exam    Vital Signs:    Vitals:    12/27/21 0823   BP: 128/76   BP Location: RUE - Right upper extremity   Patient Position: Sitting   Pulse: 86   Resp: 16   Temp: 98.7 °F (37.1 °C)   TempSrc: Oral   SpO2: 98%   LMP: 11/13/2021       General:  Nontoxic.    HEENT:  Oropharynx mildly erythematous.  No nasal congestion.    Lungs:  Clear to auscultation, not coughing.    Heart: Regular rate rhythm without murmurs     Imaging:  None    Labs:  Rapid COVID-19 swab positive    Assessment  Encounter Diagnoses   Name Primary?   • COVID-19 virus infection Yes   • Fever, unspecified fever cause    • Acute nonintractable headache, unspecified headache type        PLAN    Increase ibuprofen to 800 mg every 6 hours as needed for symptoms.  Quarantine rest of family and quarantine patient discussed with her.  May return to work 01/06/2021.  Follow-up as needed.         normal...

## 2022-04-05 NOTE — H&P ADULT - NSTOBACCOSCREENHP_GEN_A_CS
Follow up with Dr. Nuñez in 6 months    Continue finasteride    Obtain a PSA prior to your follow up appointment.      48 hours before your PSA test you should NOT:  · Ride a bike, motorcycle or tractor or anything that puts pressure on the prostate region.   · Have had a Digital rectal exam.   · Ejaculate or participate in any sexual activity that involves ejaculation.    Your diagnostic test, Prostate MRI, is being submitted for insurance approval. You will receive a phone call from central scheduling upon approval to schedule. If you don't receive a call within 7 days, please call to schedule your test Central [phone]           No

## 2022-04-07 ENCOUNTER — EMERGENCY (EMERGENCY)
Facility: HOSPITAL | Age: 57
LOS: 1 days | Discharge: HOME | End: 2022-04-07
Attending: EMERGENCY MEDICINE | Admitting: EMERGENCY MEDICINE
Payer: COMMERCIAL

## 2022-04-07 VITALS
HEART RATE: 73 BPM | RESPIRATION RATE: 16 BRPM | OXYGEN SATURATION: 100 % | HEIGHT: 61 IN | WEIGHT: 164.02 LBS | DIASTOLIC BLOOD PRESSURE: 93 MMHG | SYSTOLIC BLOOD PRESSURE: 201 MMHG | TEMPERATURE: 97 F

## 2022-04-07 VITALS
DIASTOLIC BLOOD PRESSURE: 90 MMHG | HEART RATE: 77 BPM | OXYGEN SATURATION: 99 % | RESPIRATION RATE: 18 BRPM | TEMPERATURE: 98 F | SYSTOLIC BLOOD PRESSURE: 183 MMHG

## 2022-04-07 DIAGNOSIS — Z98.891 HISTORY OF UTERINE SCAR FROM PREVIOUS SURGERY: Chronic | ICD-10-CM

## 2022-04-07 DIAGNOSIS — Z90.49 ACQUIRED ABSENCE OF OTHER SPECIFIED PARTS OF DIGESTIVE TRACT: Chronic | ICD-10-CM

## 2022-04-07 PROCEDURE — 73060 X-RAY EXAM OF HUMERUS: CPT | Mod: 26,LT

## 2022-04-07 PROCEDURE — 73030 X-RAY EXAM OF SHOULDER: CPT | Mod: 26,LT

## 2022-04-07 PROCEDURE — 99284 EMERGENCY DEPT VISIT MOD MDM: CPT | Mod: 25

## 2022-04-07 PROCEDURE — 73030 X-RAY EXAM OF SHOULDER: CPT

## 2022-04-07 PROCEDURE — 73060 X-RAY EXAM OF HUMERUS: CPT

## 2022-04-07 PROCEDURE — 99284 EMERGENCY DEPT VISIT MOD MDM: CPT

## 2022-04-07 RX ORDER — OXYCODONE HYDROCHLORIDE 5 MG/1
1 TABLET ORAL
Qty: 16 | Refills: 0
Start: 2022-04-07 | End: 2022-04-10

## 2022-04-07 RX ORDER — IBUPROFEN 200 MG
600 TABLET ORAL ONCE
Refills: 0 | Status: COMPLETED | OUTPATIENT
Start: 2022-04-07 | End: 2022-04-07

## 2022-04-07 RX ADMIN — Medication 600 MILLIGRAM(S): at 10:09

## 2022-04-07 NOTE — ED ADULT NURSE NOTE - OBJECTIVE STATEMENT
Pt a&ox3 ambulatory to ED c/o left arm/shoulder pain s/p mechanical trip and fall yesterday. Pt states she fell down 2 steps at home, landed on hands. Denies head strike or LOC.

## 2022-04-07 NOTE — ED PROVIDER NOTE - NSFOLLOWUPINSTRUCTIONS_ED_ALL_ED_FT
Humerus Fracture Treated With Immobilization       A humerus fracture is a break in the large bone in the upper arm (humerus). If the joint is stable and the bones are still in their normal position (nondisplaced), the injury may be treated with immobilization. This involves the use of a cast, splint, or sling to hold your arm in place. Immobilization ensures that your bones continue to stay in the correct position while your arm is healing.      What are the causes?    This condition may be caused by:  •A fall.      •A hard, direct hit to the arm.      •A motor vehicle accident.        What increases the risk?    The following factors may make you more likely to develop this condition:  •Being elderly.      •Having a disease that makes the bones thin and weak.        What are the signs or symptoms?    Symptoms of this condition include:  •Pain.      •Swelling.      •Bruising.      •Not being able to move your arm normally.        How is this diagnosed?    This condition may be diagnosed based on:  •A physical exam.      •X-rays of your upper arm, elbow, and shoulder.      •CT scan.        How is this treated?    Treatment for this condition involves wearing a cast, splint, or sling until the injured area is stable enough for you to begin range-of-motion exercises. You may also be prescribed pain medicine.      Follow these instructions at home:    If you have a cast:     • Do not stick anything inside the cast to scratch your skin. Doing that increases your risk of infection.      •Check the skin around the cast every day. Tell your health care provider about any concerns.      •You may put lotion on dry skin around the edges of the cast. Do not put lotion on the skin underneath the cast.      •Keep the cast clean and dry.      If you have a splint or sling:     •Wear the splint or sling as told by your health care provider. Remove it only as told by your health care provider.      •Loosen the splint or sling if your fingers tingle, become numb, or turn cold and blue.      •Keep the splint or sling clean and dry.      Bathing     • Do not take baths, swim, or use a hot tub until your health care provider approves. Ask your health care provider if you may take showers. You may only be allowed to take sponge baths.    •If the cast, splint, or sling is not waterproof:  •Do not let it get wet.      •Cover it with a watertight covering when you take a bath or shower.        •If you have a sling, remove it for bathing only if your health care provider tells you that it is safe to do that.        Managing pain, stiffness, and swelling    •If directed, put ice on the injured area.  •If you have a removable splint or sling, remove it as told by your health care provider.      •Put ice in a plastic bag.      •Place a towel between your skin and the bag or between your cast and the bag.      •Leave the ice on for 20 minutes, 2–3 times a day.        •Move your fingers often to reduce stiffness and swelling.      •Raise (elevate) the injured area above the level of your heart while you are sitting or lying down.      Driving     • Do not drive or use heavy machinery while taking prescription pain medicine.      • Do not drive while wearing a cast, splint, or sling on an arm that you use for driving. Ask your health care provider when it is safe to drive.      Activity     •Return to your normal activities as told by your health care provider. Ask your health care provider what activities are safe for you.      • Do not lift anything until your health care provider says that it is safe.      •Do range-of-motion exercises only as told by your health care provider or physical therapist.      General instructions     • Do not put pressure on any part of the cast or splint until it is fully hardened. This may take several hours.      • Do not use any products that contain nicotine or tobacco, such as cigarettes, e-cigarettes, and chewing tobacco. These can delay bone healing. If you need help quitting, ask your health care provider.      •Take over-the-counter and prescription medicines only as told by your health care provider.    •Ask your health care provider if the medicine prescribed to you can cause constipation. You may need to take steps to prevent or treat constipation, such as:  •Drink enough fluid to keep your urine pale yellow.      •Take over-the-counter or prescription medicines.      •Eat foods that are high in fiber, such as beans, whole grains, and fresh fruits and vegetables.      •Limit foods that are high in fat and processed sugars, such as fried or sweet foods.        •Keep all follow-up visits as told by your health care provider. This is important.        Contact a health care provider if:    •You have any new pain, swelling, or bruising.      •Your pain, swelling, and bruising do not improve.      •Your cast, splint, or sling becomes loose or damaged.        Get help right away if:    •Your skin or fingers on your injured arm turn blue or gray.      •Your arm feels cold or numb.      •You have severe pain in your injured arm.        Summary    •A humerus fracture is a break in the large bone in the upper arm.      •Immobilization involves the use of a cast, splint, or sling to hold your arm in place while the injury heals.      •Wear a splint or sling as told by your health care provider. Remove it only as told by your health care provider.      •Move your fingers often to reduce stiffness and swelling.      This information is not intended to replace advice given to you by your health care provider. Make sure you discuss any questions you have with your health care provider.

## 2022-04-07 NOTE — ED PROVIDER NOTE - OBJECTIVE STATEMENT
56F right hand dominant, no PMHx, sustained mechanical fall 2 days ago down 8 steps, did not head, no LOC, +FOOSH injury to left arm c/o left shoulder pain. No other injuries.

## 2022-04-07 NOTE — ED PROVIDER NOTE - CARE PROVIDER_API CALL
Forrest Sierra)  Orthopaedic Sports Medicine; Orthopaedic Surgery  [address] [address]  Phone: [phone]  Fax: [phone]  Follow Up Time:

## 2022-04-07 NOTE — ED PROVIDER NOTE - PATIENT PORTAL LINK FT
You can access the FollowMyHealth Patient Portal offered by [facility] by registering at the following website: [email or web address]. By joining [employer]’s FollowMyHealth portal, you will also be able to view your health information using other applications (apps) compatible with our system.

## 2022-04-07 NOTE — ED PROVIDER NOTE - MUSCULOSKELETAL, MLM
+ttp left proximal humerus, normal ROM, no clavicle ttp, normal ROM left elbow, +radial pulse, no snuffbox ttp

## 2022-04-08 ENCOUNTER — EMERGENCY (EMERGENCY)
Facility: HOSPITAL | Age: 57
LOS: 1 days | Discharge: HOME | End: 2022-04-08
Attending: INTERNAL MEDICINE | Admitting: INTERNAL MEDICINE
Payer: COMMERCIAL

## 2022-04-08 VITALS
HEART RATE: 73 BPM | TEMPERATURE: 99 F | SYSTOLIC BLOOD PRESSURE: 219 MMHG | WEIGHT: 164.02 LBS | OXYGEN SATURATION: 95 % | HEIGHT: 61 IN | RESPIRATION RATE: 24 BRPM | DIASTOLIC BLOOD PRESSURE: 112 MMHG

## 2022-04-08 VITALS
SYSTOLIC BLOOD PRESSURE: 171 MMHG | DIASTOLIC BLOOD PRESSURE: 85 MMHG | RESPIRATION RATE: 18 BRPM | OXYGEN SATURATION: 96 % | HEART RATE: 77 BPM

## 2022-04-08 DIAGNOSIS — Z98.891 HISTORY OF UTERINE SCAR FROM PREVIOUS SURGERY: Chronic | ICD-10-CM

## 2022-04-08 DIAGNOSIS — Z90.49 ACQUIRED ABSENCE OF OTHER SPECIFIED PARTS OF DIGESTIVE TRACT: Chronic | ICD-10-CM

## 2022-04-08 PROCEDURE — 96372 THER/PROPH/DIAG INJ SC/IM: CPT

## 2022-04-08 PROCEDURE — 99284 EMERGENCY DEPT VISIT MOD MDM: CPT

## 2022-04-08 PROCEDURE — 99284 EMERGENCY DEPT VISIT MOD MDM: CPT | Mod: 25

## 2022-04-08 RX ORDER — LIDOCAINE 4 G/100G
1 CREAM TOPICAL ONCE
Refills: 0 | Status: COMPLETED | OUTPATIENT
Start: 2022-04-08 | End: 2022-04-08

## 2022-04-08 RX ORDER — MORPHINE SULFATE 50 MG/1
4 CAPSULE, EXTENDED RELEASE ORAL ONCE
Refills: 0 | Status: DISCONTINUED | OUTPATIENT
Start: 2022-04-08 | End: 2022-04-08

## 2022-04-08 RX ORDER — LOSARTAN POTASSIUM 100 MG/1
50 TABLET, FILM COATED ORAL DAILY
Refills: 0 | Status: DISCONTINUED | OUTPATIENT
Start: 2022-04-08 | End: 2022-04-11

## 2022-04-08 RX ORDER — ONDANSETRON 8 MG/1
1 TABLET, FILM COATED ORAL
Qty: 30 | Refills: 0
Start: 2022-04-08 | End: 2022-04-17

## 2022-04-08 RX ORDER — ONDANSETRON 8 MG/1
4 TABLET, FILM COATED ORAL ONCE
Refills: 0 | Status: COMPLETED | OUTPATIENT
Start: 2022-04-08 | End: 2022-04-08

## 2022-04-08 RX ORDER — ATORVASTATIN CALCIUM 80 MG/1
80 TABLET, FILM COATED ORAL ONCE
Refills: 0 | Status: COMPLETED | OUTPATIENT
Start: 2022-04-08 | End: 2022-04-08

## 2022-04-08 RX ORDER — LIDOCAINE 4 G/100G
1 CREAM TOPICAL
Qty: 20 | Refills: 0
Start: 2022-04-08 | End: 2022-04-17

## 2022-04-08 RX ORDER — IBUPROFEN 200 MG
1 TABLET ORAL
Qty: 40 | Refills: 0
Start: 2022-04-08 | End: 2022-04-17

## 2022-04-08 RX ADMIN — LOSARTAN POTASSIUM 50 MILLIGRAM(S): 100 TABLET, FILM COATED ORAL at 02:05

## 2022-04-08 RX ADMIN — ONDANSETRON 4 MILLIGRAM(S): 8 TABLET, FILM COATED ORAL at 01:00

## 2022-04-08 RX ADMIN — ATORVASTATIN CALCIUM 80 MILLIGRAM(S): 80 TABLET, FILM COATED ORAL at 02:05

## 2022-04-08 RX ADMIN — MORPHINE SULFATE 4 MILLIGRAM(S): 50 CAPSULE, EXTENDED RELEASE ORAL at 02:10

## 2022-04-08 RX ADMIN — MORPHINE SULFATE 4 MILLIGRAM(S): 50 CAPSULE, EXTENDED RELEASE ORAL at 01:32

## 2022-04-08 RX ADMIN — LIDOCAINE 1 PATCH: 4 CREAM TOPICAL at 01:19

## 2022-04-08 NOTE — ED PROVIDER NOTE - CLINICAL SUMMARY MEDICAL DECISION MAKING FREE TEXT BOX
56F right hand dominant, no PMHx, sustained mechanical fall 2 days ago down 8 steps, did not head, no LOC, +FOOSH injury to left arm c/o left shoulder pain. No other injuries. pt was rx oxycodone which made her nauseus and vomiting so pt was not able to take any one of her medications and pain ia worse because she could not take her bp meds bp is elevated  pt given im zofran and im morphine was able to take her bp meds bp improved

## 2022-04-08 NOTE — ED ADULT TRIAGE NOTE - CHIEF COMPLAINT QUOTE
Pt states she has 10/10 left shoulder/arm pain, s/p fall this morning. As per patient, she was told her arm was fractured but then followed up with orthopedist who states arm is not fractured. Pt took prescribed oxycodone for pain. After taking oxycodone, Pt states she felt dizzy, nauseous and started vomiting. Pt states she cannot lay down due to pain, unable to sleep. Denies taking her blood pressure medication today.

## 2022-04-08 NOTE — ED PROVIDER NOTE - OBJECTIVE STATEMENT
: 56F right hand dominant, no PMHx, sustained mechanical fall 2 days ago down 8 steps, did not head, no LOC, +FOOSH injury to left arm c/o left shoulder pain. No other injuries. pt was rx oxycodone which made her nauseus and vomiting so pt was not able to take any one of her medications and pain ia worse because she could not take her bp meds bp is elevated

## 2022-04-08 NOTE — ED ADULT NURSE NOTE - NSIMPLEMENTINTERV_GEN_ALL_ED
Implemented All Fall Risk Interventions:  [place] to call system. Call bell, personal items and telephone within reach. Instruct patient to call for assistance. Room bathroom lighting operational. Non-slip footwear when patient is off stretcher. Physically safe environment: no spills, clutter or unnecessary equipment. Stretcher in lowest position, wheels locked, appropriate side rails in place. Provide visual cue, wrist band, yellow gown, etc. Monitor gait and stability. Monitor for mental status changes and reorient to person, place, and time. Review medications for side effects contributing to fall risk. Reinforce activity limits and safety measures with patient and family.

## 2022-04-08 NOTE — ED PROVIDER NOTE - PHYSICAL EXAMINATION
General:     NAD, well-nourished, well-appearing  Head:     NC/AT, EOMI, oral mucosa moist  Neck:     trachea midline  Lungs:     CTA b/l, no w/r/r  CVS:     S1S2, RRR, no m/g/r  Abd:     +BS, s/nt/nd, no organomegaly  Ext:    2+ radial and pedal pulses, no c/c/e  ortho + tenderness L shoulder anteriorly decreased rom   Neuro: AAOx3, no sensory/motor deficits

## 2022-04-08 NOTE — ED PROVIDER NOTE - NSFOLLOWUPINSTRUCTIONS_ED_ALL_ED_FT
Rest, drink plenty of fluids  Advance activity as tolerated  Continue all previously prescribed medications as directed  Follow up with your PMD 2-3 days- bring copies of your results  Return to the ER for worsening  motrin and lidocaine patch for mild to moderate pain   oxycodone  for severe pain  zofran for nausea vomiting with oxycodone  follow up ortho

## 2022-04-08 NOTE — ED ADULT NURSE NOTE - OBJECTIVE STATEMENT
pt presents w/ Left hand, shoulder pain after mechanical fall 2 days ago. Pt denies head strike, LOC. Pt was previously seen in [facility] 4/7/22 for same incident. Pt reports n/v and unable to tolerate medications. Pt has hx of HTN, HLD.

## 2022-04-08 NOTE — ED PROVIDER NOTE - CARE PLAN
1 Principal Discharge DX:	Nausea & vomiting  Secondary Diagnosis:	Sprain of shoulder and upper arm

## 2022-05-27 ENCOUNTER — RX RENEWAL (OUTPATIENT)
Age: 57
End: 2022-05-27

## 2022-06-08 NOTE — ED PROVIDER NOTE - ATTENDING CONTRIBUTION TO CARE
Implemented All Universal Safety Interventions:  [place] to call system. Call bell, personal items and telephone within reach. Instruct patient to call for assistance. Room bathroom lighting operational. Non-slip footwear when patient is off stretcher. Physically safe environment: no spills, clutter or unnecessary equipment. Stretcher in lowest position, wheels locked, appropriate side rails in place.
Dr. Reynolds: I performed a face to face bedside interview with patient regarding history of present illness, review of symptoms and past medical history. I completed an independent physical exam.  I have discussed patient's plan of care with PA.   I agree with note as stated above, having amended the EMR as needed to reflect my findings.   This includes HISTORY OF PRESENT ILLNESS, HIV, PAST MEDICAL/SURGICAL/FAMILY/SOCIAL HISTORY, ALLERGIES AND HOME MEDICATIONS, REVIEW OF SYSTEMS, PHYSICAL EXAM, and any PROGRESS NOTES during the time I functioned as the attending physician for this patient.    see mdm

## 2022-07-08 ENCOUNTER — EMERGENCY (EMERGENCY)
Facility: HOSPITAL | Age: 57
LOS: 1 days | Discharge: HOME | End: 2022-07-08
Attending: INTERNAL MEDICINE | Admitting: INTERNAL MEDICINE
Payer: COMMERCIAL

## 2022-07-08 VITALS
DIASTOLIC BLOOD PRESSURE: 89 MMHG | SYSTOLIC BLOOD PRESSURE: 167 MMHG | RESPIRATION RATE: 16 BRPM | HEART RATE: 80 BPM | OXYGEN SATURATION: 100 %

## 2022-07-08 VITALS
WEIGHT: 145.51 LBS | RESPIRATION RATE: 18 BRPM | HEART RATE: 75 BPM | HEIGHT: 61 IN | OXYGEN SATURATION: 97 % | SYSTOLIC BLOOD PRESSURE: 210 MMHG | DIASTOLIC BLOOD PRESSURE: 110 MMHG | TEMPERATURE: 98 F

## 2022-07-08 DIAGNOSIS — Z98.891 HISTORY OF UTERINE SCAR FROM PREVIOUS SURGERY: Chronic | ICD-10-CM

## 2022-07-08 DIAGNOSIS — Z90.49 ACQUIRED ABSENCE OF OTHER SPECIFIED PARTS OF DIGESTIVE TRACT: Chronic | ICD-10-CM

## 2022-07-08 LAB
ALBUMIN SERPL ELPH-MCNC: 3.7 G/DL — SIGNIFICANT CHANGE UP (ref 3.3–5)
ALP SERPL-CCNC: 161 U/L — HIGH (ref 40–120)
ALT FLD-CCNC: 40 U/L — SIGNIFICANT CHANGE UP (ref 10–45)
ANION GAP SERPL CALC-SCNC: 8 MMOL/L — SIGNIFICANT CHANGE UP (ref 5–17)
AST SERPL-CCNC: 15 U/L — SIGNIFICANT CHANGE UP (ref 10–40)
BASOPHILS # BLD AUTO: 0.05 K/UL — SIGNIFICANT CHANGE UP (ref 0–0.2)
BASOPHILS NFR BLD AUTO: 0.6 % — SIGNIFICANT CHANGE UP (ref 0–2)
BILIRUB SERPL-MCNC: 0.4 MG/DL — SIGNIFICANT CHANGE UP (ref 0.2–1.2)
BUN SERPL-MCNC: 23 MG/DL — SIGNIFICANT CHANGE UP (ref 7–23)
CALCIUM SERPL-MCNC: 8.8 MG/DL — SIGNIFICANT CHANGE UP (ref 8.4–10.5)
CHLORIDE SERPL-SCNC: 104 MMOL/L — SIGNIFICANT CHANGE UP (ref 96–108)
CO2 SERPL-SCNC: 28 MMOL/L — SIGNIFICANT CHANGE UP (ref 22–31)
CREAT SERPL-MCNC: 0.69 MG/DL — SIGNIFICANT CHANGE UP (ref 0.5–1.3)
EGFR: 103 ML/MIN/1.73M2 — SIGNIFICANT CHANGE UP
EOSINOPHIL # BLD AUTO: 0.25 K/UL — SIGNIFICANT CHANGE UP (ref 0–0.5)
EOSINOPHIL NFR BLD AUTO: 3.2 % — SIGNIFICANT CHANGE UP (ref 0–6)
GLUCOSE SERPL-MCNC: 122 MG/DL — HIGH (ref 70–99)
HCT VFR BLD CALC: 42.8 % — SIGNIFICANT CHANGE UP (ref 34.5–45)
HGB BLD-MCNC: 14.8 G/DL — SIGNIFICANT CHANGE UP (ref 11.5–15.5)
IMM GRANULOCYTES NFR BLD AUTO: 0.3 % — SIGNIFICANT CHANGE UP (ref 0–1.5)
LYMPHOCYTES # BLD AUTO: 2.21 K/UL — SIGNIFICANT CHANGE UP (ref 1–3.3)
LYMPHOCYTES # BLD AUTO: 27.9 % — SIGNIFICANT CHANGE UP (ref 13–44)
MCHC RBC-ENTMCNC: 28.9 PG — SIGNIFICANT CHANGE UP (ref 27–34)
MCHC RBC-ENTMCNC: 34.6 GM/DL — SIGNIFICANT CHANGE UP (ref 32–36)
MCV RBC AUTO: 83.6 FL — SIGNIFICANT CHANGE UP (ref 80–100)
MONOCYTES # BLD AUTO: 0.68 K/UL — SIGNIFICANT CHANGE UP (ref 0–0.9)
MONOCYTES NFR BLD AUTO: 8.6 % — SIGNIFICANT CHANGE UP (ref 2–14)
NEUTROPHILS # BLD AUTO: 4.72 K/UL — SIGNIFICANT CHANGE UP (ref 1.8–7.4)
NEUTROPHILS NFR BLD AUTO: 59.4 % — SIGNIFICANT CHANGE UP (ref 43–77)
NRBC # BLD: 0 /100 WBCS — SIGNIFICANT CHANGE UP (ref 0–0)
PLATELET # BLD AUTO: 347 K/UL — SIGNIFICANT CHANGE UP (ref 150–400)
POTASSIUM SERPL-MCNC: 3.4 MMOL/L — LOW (ref 3.5–5.3)
POTASSIUM SERPL-SCNC: 3.4 MMOL/L — LOW (ref 3.5–5.3)
PROT SERPL-MCNC: 8 G/DL — SIGNIFICANT CHANGE UP (ref 6–8.3)
RBC # BLD: 5.12 M/UL — SIGNIFICANT CHANGE UP (ref 3.8–5.2)
RBC # FLD: 13 % — SIGNIFICANT CHANGE UP (ref 10.3–14.5)
SARS-COV-2 RNA SPEC QL NAA+PROBE: SIGNIFICANT CHANGE UP
SODIUM SERPL-SCNC: 140 MMOL/L — SIGNIFICANT CHANGE UP (ref 135–145)
TROPONIN I, HIGH SENSITIVITY RESULT: 6.1 NG/L — SIGNIFICANT CHANGE UP
TROPONIN I, HIGH SENSITIVITY RESULT: 7.7 NG/L — SIGNIFICANT CHANGE UP
WBC # BLD: 7.93 K/UL — SIGNIFICANT CHANGE UP (ref 3.8–10.5)
WBC # FLD AUTO: 7.93 K/UL — SIGNIFICANT CHANGE UP (ref 3.8–10.5)

## 2022-07-08 PROCEDURE — 85025 COMPLETE CBC W/AUTO DIFF WBC: CPT

## 2022-07-08 PROCEDURE — 96374 THER/PROPH/DIAG INJ IV PUSH: CPT

## 2022-07-08 PROCEDURE — 96376 TX/PRO/DX INJ SAME DRUG ADON: CPT

## 2022-07-08 PROCEDURE — 71045 X-RAY EXAM CHEST 1 VIEW: CPT

## 2022-07-08 PROCEDURE — 82962 GLUCOSE BLOOD TEST: CPT

## 2022-07-08 PROCEDURE — 87635 SARS-COV-2 COVID-19 AMP PRB: CPT

## 2022-07-08 PROCEDURE — 84484 ASSAY OF TROPONIN QUANT: CPT

## 2022-07-08 PROCEDURE — 99285 EMERGENCY DEPT VISIT HI MDM: CPT | Mod: 25

## 2022-07-08 PROCEDURE — 36415 COLL VENOUS BLD VENIPUNCTURE: CPT

## 2022-07-08 PROCEDURE — 70450 CT HEAD/BRAIN W/O DYE: CPT | Mod: MA

## 2022-07-08 PROCEDURE — 93005 ELECTROCARDIOGRAM TRACING: CPT

## 2022-07-08 PROCEDURE — 96361 HYDRATE IV INFUSION ADD-ON: CPT

## 2022-07-08 PROCEDURE — 99285 EMERGENCY DEPT VISIT HI MDM: CPT

## 2022-07-08 PROCEDURE — 96375 TX/PRO/DX INJ NEW DRUG ADDON: CPT

## 2022-07-08 PROCEDURE — 93010 ELECTROCARDIOGRAM REPORT: CPT

## 2022-07-08 PROCEDURE — 71045 X-RAY EXAM CHEST 1 VIEW: CPT | Mod: 26

## 2022-07-08 PROCEDURE — 80053 COMPREHEN METABOLIC PANEL: CPT

## 2022-07-08 PROCEDURE — 70450 CT HEAD/BRAIN W/O DYE: CPT | Mod: 26,MA

## 2022-07-08 RX ORDER — MECLIZINE HCL 12.5 MG
1 TABLET ORAL
Qty: 15 | Refills: 0
Start: 2022-07-08 | End: 2022-07-12

## 2022-07-08 RX ORDER — DIAZEPAM 5 MG
5 TABLET ORAL ONCE
Refills: 0 | Status: DISCONTINUED | OUTPATIENT
Start: 2022-07-08 | End: 2022-07-08

## 2022-07-08 RX ORDER — ONDANSETRON 8 MG/1
1 TABLET, FILM COATED ORAL
Qty: 20 | Refills: 0
Start: 2022-07-08 | End: 2022-07-12

## 2022-07-08 RX ORDER — ONDANSETRON 8 MG/1
4 TABLET, FILM COATED ORAL ONCE
Refills: 0 | Status: COMPLETED | OUTPATIENT
Start: 2022-07-08 | End: 2022-07-08

## 2022-07-08 RX ORDER — MECLIZINE HCL 12.5 MG
25 TABLET ORAL ONCE
Refills: 0 | Status: COMPLETED | OUTPATIENT
Start: 2022-07-08 | End: 2022-07-08

## 2022-07-08 RX ORDER — METOCLOPRAMIDE HCL 10 MG
10 TABLET ORAL ONCE
Refills: 0 | Status: COMPLETED | OUTPATIENT
Start: 2022-07-08 | End: 2022-07-08

## 2022-07-08 RX ORDER — SODIUM CHLORIDE 9 MG/ML
1000 INJECTION INTRAMUSCULAR; INTRAVENOUS; SUBCUTANEOUS ONCE
Refills: 0 | Status: COMPLETED | OUTPATIENT
Start: 2022-07-08 | End: 2022-07-08

## 2022-07-08 RX ADMIN — ONDANSETRON 4 MILLIGRAM(S): 8 TABLET, FILM COATED ORAL at 15:45

## 2022-07-08 RX ADMIN — SODIUM CHLORIDE 1000 MILLILITER(S): 9 INJECTION INTRAMUSCULAR; INTRAVENOUS; SUBCUTANEOUS at 13:59

## 2022-07-08 RX ADMIN — SODIUM CHLORIDE 1000 MILLILITER(S): 9 INJECTION INTRAMUSCULAR; INTRAVENOUS; SUBCUTANEOUS at 15:53

## 2022-07-08 RX ADMIN — Medication 25 MILLIGRAM(S): at 13:59

## 2022-07-08 RX ADMIN — ONDANSETRON 4 MILLIGRAM(S): 8 TABLET, FILM COATED ORAL at 13:59

## 2022-07-08 RX ADMIN — Medication 5 MILLIGRAM(S): at 15:10

## 2022-07-08 RX ADMIN — Medication 104 MILLIGRAM(S): at 16:41

## 2022-07-08 NOTE — ED ADULT NURSE NOTE - NSFALLRSKASSESASSIST_ED_ALL_ED
Pt given water to drink and notified of NPO status after midnight, pt denies any needs at this time.       Angella Azar, RN  10/28/20 7282 yes

## 2022-07-08 NOTE — ED PROVIDER NOTE - PATIENT PORTAL LINK FT
You can access the FollowMyHealth Patient Portal offered by [facility] by registering at the following website: [email or web address]. By joining [employer]’s FollowMyHealth portal, you will also be able to view your health information using other applications (apps) compatible with our system. You can access the FollowMyHealth Patient Portal offered by [facility] by registering at the following website: [email or web address]. By joining [employer]’s FollowMyHealth portal, you will also be able to view your health information using other applications (apps) compatible with our system.

## 2022-07-08 NOTE — ED PROVIDER NOTE - PROGRESS NOTE DETAILS
KISHA Hawk- patient feeling better s/p meds. Ambulated to bathroom without difficulty. CT head stable- old lacunar infarct. Will DC home on Meclizine and Zofran with PMD follow up

## 2022-07-08 NOTE — ED PROVIDER NOTE - ATTENDING APP SHARED VISIT CONTRIBUTION OF CARE
Translation via fluent Divehi speaking RN Abril Melva:  57 y/o F with h/o HTN (Losartan), prior CVA 2021 (previously on Plavix/ASA but stopped after 3 months of taking it), pre DM, pw sudden onset room spinning dizziness associated with nausea and vomiting starting 30 minutes ago whole helping someone up. Denies fever, chills, headache, visual change, chest pain, shortness of breath, abdominal pain, diarrhea, dysuria, weakness, numbness, tingling. Called to triage for assessment.    PMD- Med Station  No smoking no ETOH  Plan: Will check basic labs with Trop, COVID swab, EKG, CXR, CT head, give fluids, Meclizine, Zofran and reassess  all ed tests neg improved with meclizine anti emetics, , valium   Dr. Delacruz:  I have reviewed and discussed with the PA/ resident the case specifics, including the history, physical assessment, evaluation, conclusion, laboratory results, and medical plan. I agree with the contents, and conclusions. I have personally examined, and interviewed the patient.

## 2022-07-08 NOTE — ED ADULT NURSE REASSESSMENT NOTE - NS ED NURSE REASSESS COMMENT FT1
Pt ambulated to bathroom and tolerated well. Pt reported feeling dizzy when getting back up from the toilet and dry heaving in the sink. MD Delacruz made aware.

## 2022-07-08 NOTE — ED PROVIDER NOTE - NS ED ATTENDING STATEMENT MOD
This was a shared visit with the LAINE. I reviewed and verified the documentation and independently performed the documented:

## 2022-07-08 NOTE — ED ADULT NURSE NOTE - NSICDXPASTMEDICALHX_GEN_ALL_CORE_FT
PAST MEDICAL HISTORY:  CVA (cerebrovascular accident)     Epigastric burning sensation     Hypertension     Vertigo

## 2022-07-08 NOTE — ED PROVIDER NOTE - CLINICAL SUMMARY MEDICAL DECISION MAKING FREE TEXT BOX
Translation via fluent Slovak speaking RN Abril Frye:   57 y/o F with h/o HTN (Losartan), pre DM, pw sudden onset room spinning dizziness associated with nausea and vomiting starting 30 minutes ago whole helping someone up. Denies fever, chills, headache, visual change, chest pain, shortness of breath, abdominal pain, diarrhea, dysuria, weakness, numbness, tingling. Called to triage for assessment.    PMD- Med Station  No smoking no ETOH  Plan: Will check basic labs with Trop, COVID swab, EKG, CXR, CT head, give fluids, Meclizine, Zofran and reassess Translation via fluent Maltese speaking RN Abril Frye:   57 y/o F with h/o HTN (Losartan), prior CVA on Plavix/ASA, pre DM, pw sudden onset room spinning dizziness associated with nausea and vomiting starting 30 minutes ago whole helping someone up. Denies fever, chills, headache, visual change, chest pain, shortness of breath, abdominal pain, diarrhea, dysuria, weakness, numbness, tingling. Called to triage for assessment.    PMD- Med Station  No smoking no ETOH  Plan: Will check basic labs with Trop, COVID swab, EKG, CXR, CT head, give fluids, Meclizine, Zofran and reassess Translation via fluent Greek speaking RN Abril Frye:  57 y/o F with h/o HTN (Losartan), prior CVA 2021 (previously on Plavix/ASA but stopped after 3 months of taking it), pre DM, pw sudden onset room spinning dizziness associated with nausea and vomiting starting 30 minutes ago whole helping someone up. Denies fever, chills, headache, visual change, chest pain, shortness of breath, abdominal pain, diarrhea, dysuria, weakness, numbness, tingling. Called to triage for assessment.    PMD- Med Station  No smoking no ETOH  Plan: Will check basic labs with Trop, COVID swab, EKG, CXR, CT head, give fluids, Meclizine, Zofran and reassess

## 2022-07-08 NOTE — ED ADULT TRIAGE NOTE - ARRIVAL INFO ADDITIONAL COMMENTS
Patient BIB family member for complaints of dizziness, nausea and vomiting about an hour ago. Patient states she was cleaning and had sudden onset of dizziness. Patient denies numbness, tinging, paresthesias, blurred vision. Dr. Delacruz/ Meena BEARD evaluating in Triage. Patient to CT via stretcher. Patient BIB family member for complaints of dizziness, nausea and vomiting about an half an hour ago (1PM). Patient states she was cleaning and had sudden onset of dizziness. Patient denies numbness, tinging, paresthesias, blurred vision. Dr. Delacruz/ Meena BEARD evaluating in Triage. Patient to CT via stretcher.

## 2022-07-08 NOTE — ED PROVIDER NOTE - NSFOLLOWUPINSTRUCTIONS_ED_ALL_ED_FT
Maltese:  Mitra un seguimiento con nguyen PMD dentro de 1-2 días.  Mitra un seguimiento con ENT dentro de la semana; puede llamar: Encuentre atilio línea de ayuda médica ([phone]) para obtener ayuda o llame a nuestra Cl[facility]ica de ENT/Vértigo en 1-2 días [phone] en [address] [place] [address], [phone]  Descansa, aumenta tus líquidos.  Olmito Meclizine 25 mg 1 tableta cada 8 horas según sea necesario para los mareos; tenga cuidado con la somnolencia cuando tome rodo medicamento y no conduzca ni maneje maquinaria pesada.  Olmito Zofran 4 mg disuelto debajo de la lengua cada 6 horas según sea necesario para las náuseas.  Si ha continuado, empeorado o CUALQUIER síntoma preocupante nuevo, regrese al departamento de emergencias.  Empeoramiento, continuación o CUALQUIER síntoma preocupante nuevo, regrese al departamento de emergencias.    English:  Follow up with your PMD within 1-2 days.   Follow up with ENT within the week- you can call: Find a Physician helpline ([phone]) for assistance or call our ENT/Vertigo Clinic in 1-2 days [phone] at [address] [place] [address], [phone]   Rest, increase your fluids.   Take Meclizine 25mg 1 tab every 8 hrs as needed for dizziness- caution drowsiness when taking this medication and do not drive nor operate heavy machinery.  Take Zofran 4mg dissolve under your tongue every 6 hrs as needed for nausea.   If you have continued, worsening or ANY new concerning symptoms return to the emergency department.  Worsening, continued or ANY new concerning symptoms return to the emergency department.     ****      Vértigo    Vertigo      El vértigo es la sensación de que usted o todo lo que lo rodea se mueve cuando en realidad eso no sucede. Esta sensación puede aparecer y desaparecer en cualquier momento. El vértigo suele desaparecer solo. El vértigo puede ser peligroso si ocurre mientras está haciendo algo que podría suponer un riesgo para usted y para los demás, por ejemplo, conduciendo un automóvil u operando maquinaria.    Nguyen médico le hará estudios para tratar de determinar la causa del vértigo. Los estudios también ayudarán al médico a decidir cuál es la mejor manera de tratar nguyen afección.      Siga estas instrucciones en nguyen casa:      Comida y bebida       A comparison of three sample cups showing dark yellow, yellow, and pale yellow urine.       A sign showing that a person should not drink beer, wine, or liquor.     •La deshidratación puede empeorar el vértigo. Ruchi suficiente líquido jose davdi para mantener la orina de color amarillo pálido.      • No ruchi alcohol.      Actividad     •Retome belkys actividades normales según lo indicado por el médico. Pregúntele al médico qué actividades son seguras para usted.      •Por la mañana, siéntese serena a un lado de la cama. Cuando se sienta daniel, póngase lentamente de pie mientras se sostiene de algo, hasta que sepa que ha logrado el equilibrio.      •Muévase lentamente. Evite algunas posiciones o determinados movimientos repentinos de la sonali y el cuerpo jose david se lo haya indicado el médico.      •Si tiene dificultad para caminar o mantener el equilibrio, use un bastón para mantener la estabilidad. Si se siente mareado o inestable, siéntese de inmediato.      •No mitra ninguna tarea que podría ponerlo en riesgo a usted o a otras personas en elda de tener vértigo.      •Evite agacharse si se siente mareado. En gnuyen casa, coloque los objetos de modo que le resulte fácil alcanzarlos sin doblarse o agacharse.      • No conduzca vehículos ni opere maquinaria si se siente mareado.      Instrucciones generales     •Use los medicamentos de venta alyssa y los recetados solamente jose david se lo haya indicado el médico.      •Concurra a todas las visitas de seguimiento. [place] es importante.        Comuníquese con un médico si:    •Los medicamentos no le alivian el vértigo o rodo empeora.      •Nguyen afección empeora o presenta síntomas nuevos.      •Tiene fiebre.      •Siente náuseas o tiene vómitos, o si las náuseas empeoran.      •Belkys familiares o amigos advierten cambios en nguyen comportamiento.      •Tiene adormecimiento o sensación de pinchazos y hormigueo en atilio parte del cuerpo.        Busque ayuda de inmediato si:    •Se siente mareado continuamente o se desmaya.      •Presenta chi de sonali intensos.      •Presenta rigidez en el brien.      •Presenta sensibilidad a la juan c.      •Tiene dificultad para moverse o hablar.      •Tiene debilidad en las joey, los brazos o las piernas.      •Presenta cambios en la audición o la visión.      Estos síntomas pueden representar un problema grave que constituye atilio emergencia. No espere a jorge si los síntomas desaparecen. Solicite atención médica de inmediato. Comuníquese con el servicio de emergencias de nguyen localidad (911 en los [place]). No conduzca por belkys propios medios hasta el hospital.       Resumen    •El vértigo es la sensación de que usted o todo lo que lo rodea se mueve cuando en realidad eso no sucede.      •Nguyen médico le hará estudios para tratar de determinar la causa del vértigo.      •Siga las instrucciones de cuidado en el hogar. Fabio vez le indiquen que evite ciertas tareas, posiciones o movimientos.      •Comuníquese con un médico si los medicamentos no alivian los síntomas o si tiene fiebre, náuseas, vómitos o cambios en el comportamiento.      •Solicite ayuda de inmediato si tiene chi de sonali intensos o dificultad para hablar, o si experimenta problemas auditivos o de visión.      Esta información no tiene jose david fin reemplazar el consejo del médico. Asegúrese de hacerle al médico cualquier pregunta que tenga.

## 2022-07-08 NOTE — ED ADULT NURSE NOTE - NSIMPLEMENTINTERV_GEN_ALL_ED
[facility]  Jessica Bhakta  : 1970  MRN: [id number]  CSN: [id number]    History and Physical    Subjective   Jessica Bhakta is a 49 y.o. year old  who presents for consultation about surgery due to DUB.  Patient thought that she was starting to go through menopause because she skipped a couple of periods, but then her bleeding resumed and was worse than it had every been.  In January she had two, and then February and March were really heavy with lots of clots.  When she started on , she has never stopped and has been bleeding continuously since that time.  Bleeding is currently light, and she is cramping most morning but not hurting right now.    Past Medical History:   Diagnosis Date   • Depression    • Endometriosis    • Hypertension      Past Surgical History:   Procedure Laterality Date   • BREAST BIOPSY Bilateral ,    benign   • CHOLECYSTECTOMY     • DILATATION AND CURETTAGE     • EXPLORATORY LAPAROTOMY     • WISDOM TOOTH EXTRACTION       Social History    Tobacco Use      Smoking status: Never Smoker      Smokeless tobacco: Never Used      Current Outpatient Medications:   •  ALPRAZolam (XANAX) 1 MG tablet, Take 1 tablet by mouth 3 (Three) Times a Day As Needed for Anxiety., Disp: 90 tablet, Rfl: 0  •  Cyanocobalamin 1000 MCG/ML kit, Inject 1 mL as directed 1 (One) Time Per Week., Disp: 4 kit, Rfl: 3  •  desvenlafaxine (PRISTIQ) 50 MG 24 hr tablet, Take 1 tablet by mouth Daily., Disp: 90 tablet, Rfl: 3  •  hydrochlorothiazide (HYDRODIURIL) 12.5 MG tablet, Take 1 tablet by mouth Daily., Disp: 90 tablet, Rfl: 3  •  losartan (COZAAR) 25 MG tablet, Take 1 tablet by mouth Daily., Disp: 90 tablet, Rfl: 3  •  metFORMIN (GLUCOPHAGE) 500 MG tablet, Take 1 tablet by mouth 2 (Two) Times a Day With Meals., Disp: 60 tablet, Rfl: 3  •  Norethin Ace-Eth Estrad-FE (TAYTULLA) 1-20 MG-MCG(24) capsule, Take  by mouth., Disp: , Rfl:   •  vitamin d (VITAMIN D3 ULTRA STRENGTH) 5000 units capsule,  "Take 5,000 Units by mouth Daily., Disp: , Rfl:     No Known Allergies    Family History   Problem Relation Age of Onset   • No Known Problems Mother    • Liver disease Father    • Lupus Sister    • Coronary artery disease Sister    • No Known Problems Son    • No Known Problems Maternal Grandmother    • No Known Problems Maternal Grandfather    • No Known Problems Paternal Grandmother    • No Known Problems Paternal Grandfather    • No Known Problems Son    • Colon cancer Maternal Aunt 60   • Colon cancer Paternal Uncle 60   • Breast cancer Neg Hx    • Ovarian cancer Neg Hx    • Uterine cancer Neg Hx    • Melanoma Neg Hx    • Prostate cancer Neg Hx      Review of Systems   Constitutional: Negative for activity change and unexpected weight change.   Respiratory: Negative for shortness of breath.    Cardiovascular: Negative for chest pain.   Gastrointestinal: Positive for diarrhea (metformin-related). Negative for abdominal pain and constipation.   Genitourinary: Positive for menstrual problem and vaginal bleeding. Negative for pelvic pain.   Musculoskeletal: Positive for arthralgias and back pain.   Neurological: Negative for dizziness and headaches.   Psychiatric/Behavioral: Negative for dysphoric mood. The patient is nervous/anxious (well-controlled with Pristiq and xanax).          Objective   /78   Ht 170.2 cm (67\")   Wt 98 kg (216 lb)   LMP 04/06/2019 (Exact Date)   BMI 33.83 kg/m²     Physical Exam   Physical Exam   Constitutional: She is oriented to person, place, and time. She appears well-developed and well-nourished. No distress.   HENT:   Head: Normocephalic and atraumatic.   Eyes: EOM are normal.   Neck: Normal range of motion. No thyromegaly present.   Pulmonary/Chest: Effort normal.   Abdominal: Soft. She exhibits no distension. There is no tenderness.   Genitourinary:   Genitourinary Comments: Uterus normal in size, but with thickened endo lining both during menses and mid-cycle. "   Musculoskeletal: Normal range of motion.   Neurological: She is alert and oriented to person, place, and time.   Skin: Skin is warm and dry.   Psychiatric: She has a normal mood and affect. Her behavior is normal. Judgment normal.   Nursing note and vitals reviewed.      Labs  Lab Results   Component Value Date     05/14/2019    HGB 12.0 05/14/2019    HCT 37.3 05/14/2019    WBC 8.18 05/14/2019     05/14/2019    K 4.0 05/14/2019     05/14/2019    CO2 24.9 05/14/2019    BUN 12 05/14/2019    CREATININE 0.93 05/14/2019    GLUCOSE 103 (H) 03/28/2016    ALBUMIN 4.10 05/14/2019    CALCIUM 9.7 05/14/2019    AST 13 05/14/2019    ALT 16 05/14/2019    BILITOT 0.2 05/14/2019        Assessment & Plan    Jessica was seen today for dysfunctional uterine bleeding.    Diagnoses and all orders for this visit:    DUB (dysfunctional uterine bleeding): Patient has not been bleeding since early April.  Her endometrial lining was thick on 2 different ultrasounds, one during when her menstrual cycle should have been occurring, and the other midcycle.  We have discussed her risk factors for uterine cancer but also reviewed that it is more likely she has benign endometrial polyps.  The patient has had a recent endometrial biopsy by Penny Potter which was benign, but we have reviewed that there is a greater sensitivity to a D&C than an in office biopsy.  The procedure of a hysteroscopy D&C has been explained to the patient, and postop expectations discussed.  Patient was up without further questions.  Surgery next Monday.  -     Case Request; Standing  -     CBC and Differential; Future  -     ECG 12 Lead; Future  -     Case Request    Other orders  -     Follow Anesthesia Guidelines / Standing Orders; Future  -     Follow Anesthesia Guidelines / Standing Orders; Standing  -     Place sequential compression device; Standing  -     Type & Screen; Standing  -     Obtain informed consent; Standing        Devi Doyle  MD  6/7/2019  10:41 AM            Implemented All Fall with Harm Risk Interventions:  [place] to call system. Call bell, personal items and telephone within reach. Instruct patient to call for assistance. Room bathroom lighting operational. Non-slip footwear when patient is off stretcher. Physically safe environment: no spills, clutter or unnecessary equipment. Stretcher in lowest position, wheels locked, appropriate side rails in place. Provide visual cue, wrist band, yellow gown, etc. Monitor gait and stability. Monitor for mental status changes and reorient to person, place, and time. Review medications for side effects contributing to fall risk. Reinforce activity limits and safety measures with patient and family. Provide visual clues: red socks.

## 2022-07-08 NOTE — ED ADULT NURSE NOTE - OBJECTIVE STATEMENT
Pt came from work with c/o dizziness, nausea and vomiting that started 30 mins ago. Pt states that she was cleaning when all of a sudden she felt an onset of dizziness and "felt like the room was spinning." Pt with hx CVA (plavix, asa) and prediabetes. Pt came from work with c/o dizziness, nausea and vomiting that started 30 mins ago. Pt states that she was cleaning when all of a sudden she felt an onset of dizziness and "felt like the room was spinning." Pt with hx CVA (on plavix & asa) and prediabetes. Pt denies any chest pain or SOB at this time.

## 2022-07-08 NOTE — ED PROVIDER NOTE - OBJECTIVE STATEMENT
Translation via fluent Mohawk speaking RN Abril Frye   57 y/o F with h/o HTN (Losartan), pre DM, pw sudden onset room spinning dizziness associated with nausea and vomiting starting 30 minutes ago whole helping someone up. Denies fever, chills, headache, visual change, chest pain, shortness of breath, abdominal pain, diarrhea, dysuria, weakness, numbness, tingling. Called to triage for assessment.    P[facility]- [facility]  No smoking no ETOH Translation via fluent Korean speaking RN Abril Frye   57 y/o F with h/o HTN (Losartan), prior CVA on Plavix/ASA, pre DM, pw sudden onset room spinning dizziness associated with nausea and vomiting starting 30 minutes ago whole helping someone up. Denies fever, chills, headache, visual change, chest pain, shortness of breath, abdominal pain, diarrhea, dysuria, weakness, numbness, tingling. Called to triage for assessment.    P[facility]- [facility]  No smoking no ETOH Translation via fluent Frisian speaking RN Abril Frye   57 y/o F with h/o HTN (Losartan), prior CVA 2021 (previously on Plavix/ASA but stopped after 3 months of taking it), pre DM, pw sudden onset room spinning dizziness associated with nausea and vomiting starting 30 minutes ago whole helping someone up. Denies fever, chills, headache, visual change, chest pain, shortness of breath, abdominal pain, diarrhea, dysuria, weakness, numbness, tingling. Called to triage for assessment.    [facility]- [facility]  No smoking no ETOH

## 2023-03-20 NOTE — ED PROVIDER NOTE - CADM POA CENTRAL LINE
PAST SURGICAL HISTORY:  History of knee surgery     History of shoulder surgery     S/P arthroscopy left shoulder- 2014, left knee-1999     No

## 2023-04-13 PROBLEM — I63.9 CEREBRAL INFARCTION, UNSPECIFIED: Chronic | Status: ACTIVE | Noted: 2022-07-08

## 2023-05-03 ENCOUNTER — APPOINTMENT (OUTPATIENT)
Dept: ULTRASOUND IMAGING | Facility: CLINIC | Age: 58
End: 2023-05-03
Payer: MEDICAID

## 2023-05-03 ENCOUNTER — OUTPATIENT (OUTPATIENT)
Dept: OUTPATIENT SERVICES | Facility: HOSPITAL | Age: 58
LOS: 1 days | End: 2023-05-03
Payer: MEDICAID

## 2023-05-03 DIAGNOSIS — Z98.891 HISTORY OF UTERINE SCAR FROM PREVIOUS SURGERY: Chronic | ICD-10-CM

## 2023-05-03 DIAGNOSIS — R00.2 PALPITATIONS: ICD-10-CM

## 2023-05-03 DIAGNOSIS — R55 SYNCOPE AND COLLAPSE: ICD-10-CM

## 2023-05-03 DIAGNOSIS — Z00.00 ENCOUNTER FOR GENERAL ADULT MEDICAL EXAMINATION WITHOUT ABNORMAL FINDINGS: ICD-10-CM

## 2023-05-03 DIAGNOSIS — I10 ESSENTIAL (PRIMARY) HYPERTENSION: ICD-10-CM

## 2023-05-03 DIAGNOSIS — R26.9 UNSPECIFIED ABNORMALITIES OF GAIT AND MOBILITY: ICD-10-CM

## 2023-05-03 DIAGNOSIS — G45.9 TRANSIENT CEREBRAL ISCHEMIC ATTACK, UNSPECIFIED: ICD-10-CM

## 2023-05-03 DIAGNOSIS — R42 DIZZINESS AND GIDDINESS: ICD-10-CM

## 2023-05-03 DIAGNOSIS — Z90.49 ACQUIRED ABSENCE OF OTHER SPECIFIED PARTS OF DIGESTIVE TRACT: Chronic | ICD-10-CM

## 2023-05-03 PROCEDURE — 93975 VASCULAR STUDY: CPT | Mod: 26

## 2023-05-03 PROCEDURE — 93880 EXTRACRANIAL BILAT STUDY: CPT

## 2023-05-03 PROCEDURE — 93880 EXTRACRANIAL BILAT STUDY: CPT | Mod: 26

## 2023-05-03 PROCEDURE — 93975 VASCULAR STUDY: CPT

## 2023-05-15 NOTE — ED ADULT NURSE NOTE - CHIEF COMPLAINT
The patient is a 56y Female complaining of arm pain/injury. Low Dose Naltrexone Pregnancy And Lactation Text: Naltrexone is pregnancy category C.  There have been no adequate and well-controlled studies in pregnant women.  It should be used in pregnancy only if the potential benefit justifies the potential risk to the fetus.   Limited data indicates that naltrexone is minimally excreted into breastmilk.

## 2023-06-03 ENCOUNTER — EMERGENCY (EMERGENCY)
Facility: HOSPITAL | Age: 58
LOS: 1 days | Discharge: HOME | End: 2023-06-03
Attending: EMERGENCY MEDICINE | Admitting: EMERGENCY MEDICINE
Payer: MEDICAID

## 2023-06-03 VITALS
OXYGEN SATURATION: 98 % | SYSTOLIC BLOOD PRESSURE: 214 MMHG | TEMPERATURE: 98 F | HEART RATE: 77 BPM | DIASTOLIC BLOOD PRESSURE: 104 MMHG | RESPIRATION RATE: 18 BRPM | WEIGHT: 169.09 LBS | HEIGHT: 64 IN

## 2023-06-03 DIAGNOSIS — Z98.891 HISTORY OF UTERINE SCAR FROM PREVIOUS SURGERY: Chronic | ICD-10-CM

## 2023-06-03 DIAGNOSIS — Z90.49 ACQUIRED ABSENCE OF OTHER SPECIFIED PARTS OF DIGESTIVE TRACT: Chronic | ICD-10-CM

## 2023-06-03 PROCEDURE — 99284 EMERGENCY DEPT VISIT MOD MDM: CPT

## 2023-06-03 RX ORDER — OXYCODONE AND ACETAMINOPHEN 5; 325 MG/1; MG/1
1 TABLET ORAL
Qty: 12 | Refills: 0
Start: 2023-06-03 | End: 2023-06-05

## 2023-06-03 RX ORDER — AMOXICILLIN 250 MG/5ML
1 SUSPENSION, RECONSTITUTED, ORAL (ML) ORAL
Qty: 30 | Refills: 0
Start: 2023-06-03 | End: 2023-06-12

## 2023-06-03 RX ORDER — AMOXICILLIN 250 MG/5ML
500 SUSPENSION, RECONSTITUTED, ORAL (ML) ORAL ONCE
Refills: 0 | Status: COMPLETED | OUTPATIENT
Start: 2023-06-03 | End: 2023-06-03

## 2023-06-03 RX ADMIN — Medication 500 MILLIGRAM(S): at 23:28

## 2023-06-03 NOTE — ED PROVIDER NOTE - OBJECTIVE STATEMENT
56 y/o F with h/o HTN presents to ED c/o right lower tooth pain for past 4 days, c/o her tooth had filling and it came out. no fever

## 2023-06-03 NOTE — ED PROVIDER NOTE - NSFOLLOWUPINSTRUCTIONS_ED_ALL_ED_FT
Toothache  take Amoxicillin and percocet as prescribed   follow up with your dentist in 2-3 days Toothache  take Amoxicillin and percocet as prescribed   follow up with your dentist in 2-3 days  Hypertension    Hypertension, commonly called high blood pressure, is when the force of blood pumping through your arteries is too strong. Hypertension forces your heart to work harder to pump blood. Your arteries may become narrow or stiff. Having untreated or uncontrolled hypertension for a long period of time can cause heart attack, stroke, kidney disease, and other problems. If started on a medication, take exactly as prescribed by your health care professional. Maintain a healthy lifestyle and follow up with your primary care physician.    SEEK IMMEDIATE MEDICAL CARE IF YOU HAVE ANY OF THE FOLLOWING SYMPTOMS: severe headache, confusion, chest pain, abdominal pain, vomiting, or shortness of breath.

## 2023-06-03 NOTE — ED PROVIDER NOTE - PATIENT PORTAL LINK FT
You can access the FollowMyHealth Patient Portal offered by [facility] by registering at the following website: [email or web address]. By joining [employer]’s FollowMyHealth portal, you will also be able to view your health information using other applications (apps) compatible with our system.

## 2023-06-04 VITALS
OXYGEN SATURATION: 99 % | SYSTOLIC BLOOD PRESSURE: 169 MMHG | RESPIRATION RATE: 18 BRPM | DIASTOLIC BLOOD PRESSURE: 75 MMHG | HEART RATE: 72 BPM

## 2023-06-04 LAB
ANION GAP SERPL CALC-SCNC: 7 MMOL/L — SIGNIFICANT CHANGE UP (ref 5–17)
BASOPHILS # BLD AUTO: 0.05 K/UL — SIGNIFICANT CHANGE UP (ref 0–0.2)
BASOPHILS NFR BLD AUTO: 0.5 % — SIGNIFICANT CHANGE UP (ref 0–2)
BUN SERPL-MCNC: 15 MG/DL — SIGNIFICANT CHANGE UP (ref 7–23)
CALCIUM SERPL-MCNC: 8.7 MG/DL — SIGNIFICANT CHANGE UP (ref 8.4–10.5)
CHLORIDE SERPL-SCNC: 102 MMOL/L — SIGNIFICANT CHANGE UP (ref 96–108)
CO2 SERPL-SCNC: 29 MMOL/L — SIGNIFICANT CHANGE UP (ref 22–31)
CREAT SERPL-MCNC: 0.68 MG/DL — SIGNIFICANT CHANGE UP (ref 0.5–1.3)
EGFR: 103 ML/MIN/1.73M2 — SIGNIFICANT CHANGE UP
EOSINOPHIL # BLD AUTO: 0.24 K/UL — SIGNIFICANT CHANGE UP (ref 0–0.5)
EOSINOPHIL NFR BLD AUTO: 2.5 % — SIGNIFICANT CHANGE UP (ref 0–6)
GLUCOSE SERPL-MCNC: 139 MG/DL — HIGH (ref 70–99)
HCT VFR BLD CALC: 41.1 % — SIGNIFICANT CHANGE UP (ref 34.5–45)
HGB BLD-MCNC: 14 G/DL — SIGNIFICANT CHANGE UP (ref 11.5–15.5)
IMM GRANULOCYTES NFR BLD AUTO: 0.3 % — SIGNIFICANT CHANGE UP (ref 0–0.9)
LYMPHOCYTES # BLD AUTO: 1.52 K/UL — SIGNIFICANT CHANGE UP (ref 1–3.3)
LYMPHOCYTES # BLD AUTO: 16.1 % — SIGNIFICANT CHANGE UP (ref 13–44)
MCHC RBC-ENTMCNC: 28.6 PG — SIGNIFICANT CHANGE UP (ref 27–34)
MCHC RBC-ENTMCNC: 34.1 GM/DL — SIGNIFICANT CHANGE UP (ref 32–36)
MCV RBC AUTO: 83.9 FL — SIGNIFICANT CHANGE UP (ref 80–100)
MONOCYTES # BLD AUTO: 0.83 K/UL — SIGNIFICANT CHANGE UP (ref 0–0.9)
MONOCYTES NFR BLD AUTO: 8.8 % — SIGNIFICANT CHANGE UP (ref 2–14)
NEUTROPHILS # BLD AUTO: 6.75 K/UL — SIGNIFICANT CHANGE UP (ref 1.8–7.4)
NEUTROPHILS NFR BLD AUTO: 71.8 % — SIGNIFICANT CHANGE UP (ref 43–77)
NRBC # BLD: 0 /100 WBCS — SIGNIFICANT CHANGE UP (ref 0–0)
PLATELET # BLD AUTO: 377 K/UL — SIGNIFICANT CHANGE UP (ref 150–400)
POTASSIUM SERPL-MCNC: 3.7 MMOL/L — SIGNIFICANT CHANGE UP (ref 3.5–5.3)
POTASSIUM SERPL-SCNC: 3.7 MMOL/L — SIGNIFICANT CHANGE UP (ref 3.5–5.3)
RBC # BLD: 4.9 M/UL — SIGNIFICANT CHANGE UP (ref 3.8–5.2)
RBC # FLD: 13.2 % — SIGNIFICANT CHANGE UP (ref 10.3–14.5)
SODIUM SERPL-SCNC: 138 MMOL/L — SIGNIFICANT CHANGE UP (ref 135–145)
WBC # BLD: 9.42 K/UL — SIGNIFICANT CHANGE UP (ref 3.8–10.5)
WBC # FLD AUTO: 9.42 K/UL — SIGNIFICANT CHANGE UP (ref 3.8–10.5)

## 2023-06-04 PROCEDURE — 36415 COLL VENOUS BLD VENIPUNCTURE: CPT

## 2023-06-04 PROCEDURE — 85025 COMPLETE CBC W/AUTO DIFF WBC: CPT

## 2023-06-04 PROCEDURE — 99284 EMERGENCY DEPT VISIT MOD MDM: CPT | Mod: 25

## 2023-06-04 PROCEDURE — 96374 THER/PROPH/DIAG INJ IV PUSH: CPT

## 2023-06-04 PROCEDURE — 80048 BASIC METABOLIC PNL TOTAL CA: CPT

## 2023-06-04 PROCEDURE — 96375 TX/PRO/DX INJ NEW DRUG ADDON: CPT

## 2023-06-04 RX ORDER — LABETALOL HCL 100 MG
10 TABLET ORAL ONCE
Refills: 0 | Status: COMPLETED | OUTPATIENT
Start: 2023-06-04 | End: 2023-06-04

## 2023-06-04 RX ORDER — ONDANSETRON 8 MG/1
4 TABLET, FILM COATED ORAL ONCE
Refills: 0 | Status: COMPLETED | OUTPATIENT
Start: 2023-06-04 | End: 2023-06-04

## 2023-06-04 RX ADMIN — Medication 10 MILLIGRAM(S): at 01:07

## 2023-06-04 RX ADMIN — Medication 10 MILLIGRAM(S): at 00:37

## 2023-06-04 RX ADMIN — ONDANSETRON 4 MILLIGRAM(S): 8 TABLET, FILM COATED ORAL at 02:06

## 2023-06-04 NOTE — ED ADULT NURSE NOTE - OBJECTIVE STATEMENT
58 y/o F with h/o HTN presents to ED c/o right lower tooth pain for past 4 days, c/o her tooth had filling and it came out. no fever

## 2023-06-04 NOTE — ED ADULT NURSE NOTE - NSFALLUNIVINTERV_ED_ALL_ED
Bed/Stretcher in lowest position, wheels locked, appropriate side rails in place/Call bell, personal items and telephone in reach/Instruct patient to call for assistance before getting out of bed/chair/stretcher/Non-slip footwear applied when patient is off stretcher/[place] to call system/Physically safe environment - no spills, clutter or unnecessary equipment/Purposeful proactive rounding/Room/bathroom lighting operational, light cord in reach

## 2023-07-06 ENCOUNTER — APPOINTMENT (OUTPATIENT)
Dept: FAMILY MEDICINE | Facility: HOSPITAL | Age: 58
End: 2023-07-06

## 2023-07-08 ENCOUNTER — EMERGENCY (EMERGENCY)
Facility: HOSPITAL | Age: 58
LOS: 1 days | Discharge: HOME | End: 2023-07-08
Attending: STUDENT IN AN ORGANIZED HEALTH CARE EDUCATION/TRAINING PROGRAM | Admitting: STUDENT IN AN ORGANIZED HEALTH CARE EDUCATION/TRAINING PROGRAM
Payer: MEDICAID

## 2023-07-08 VITALS
RESPIRATION RATE: 18 BRPM | SYSTOLIC BLOOD PRESSURE: 162 MMHG | HEART RATE: 66 BPM | DIASTOLIC BLOOD PRESSURE: 81 MMHG | OXYGEN SATURATION: 98 %

## 2023-07-08 VITALS
WEIGHT: 166.01 LBS | SYSTOLIC BLOOD PRESSURE: 203 MMHG | HEART RATE: 60 BPM | HEIGHT: 64 IN | TEMPERATURE: 98 F | RESPIRATION RATE: 16 BRPM | DIASTOLIC BLOOD PRESSURE: 97 MMHG | OXYGEN SATURATION: 99 %

## 2023-07-08 DIAGNOSIS — Z90.49 ACQUIRED ABSENCE OF OTHER SPECIFIED PARTS OF DIGESTIVE TRACT: Chronic | ICD-10-CM

## 2023-07-08 DIAGNOSIS — Z98.891 HISTORY OF UTERINE SCAR FROM PREVIOUS SURGERY: Chronic | ICD-10-CM

## 2023-07-08 LAB
ALBUMIN SERPL ELPH-MCNC: 3.4 G/DL — SIGNIFICANT CHANGE UP (ref 3.3–5)
ALP SERPL-CCNC: 132 U/L — HIGH (ref 40–120)
ALT FLD-CCNC: 29 U/L — SIGNIFICANT CHANGE UP (ref 10–45)
ANION GAP SERPL CALC-SCNC: 8 MMOL/L — SIGNIFICANT CHANGE UP (ref 5–17)
AST SERPL-CCNC: 18 U/L — SIGNIFICANT CHANGE UP (ref 10–40)
BASOPHILS # BLD AUTO: 0.06 K/UL — SIGNIFICANT CHANGE UP (ref 0–0.2)
BASOPHILS NFR BLD AUTO: 0.9 % — SIGNIFICANT CHANGE UP (ref 0–2)
BILIRUB SERPL-MCNC: 0.4 MG/DL — SIGNIFICANT CHANGE UP (ref 0.2–1.2)
BUN SERPL-MCNC: 17 MG/DL — SIGNIFICANT CHANGE UP (ref 7–23)
CALCIUM SERPL-MCNC: 8.8 MG/DL — SIGNIFICANT CHANGE UP (ref 8.4–10.5)
CHLORIDE SERPL-SCNC: 101 MMOL/L — SIGNIFICANT CHANGE UP (ref 96–108)
CK MB BLD-MCNC: 0.9 % — SIGNIFICANT CHANGE UP (ref 0–3.5)
CK MB CFR SERPL CALC: 0.6 NG/ML — SIGNIFICANT CHANGE UP (ref 0.5–10)
CK SERPL-CCNC: 69 U/L — SIGNIFICANT CHANGE UP (ref 25–170)
CO2 SERPL-SCNC: 28 MMOL/L — SIGNIFICANT CHANGE UP (ref 22–31)
CREAT SERPL-MCNC: 0.73 MG/DL — SIGNIFICANT CHANGE UP (ref 0.5–1.3)
EGFR: 98 ML/MIN/1.73M2 — SIGNIFICANT CHANGE UP
EOSINOPHIL # BLD AUTO: 0.21 K/UL — SIGNIFICANT CHANGE UP (ref 0–0.5)
EOSINOPHIL NFR BLD AUTO: 3.1 % — SIGNIFICANT CHANGE UP (ref 0–6)
GLUCOSE SERPL-MCNC: 102 MG/DL — HIGH (ref 70–99)
HCT VFR BLD CALC: 42.4 % — SIGNIFICANT CHANGE UP (ref 34.5–45)
HGB BLD-MCNC: 14.3 G/DL — SIGNIFICANT CHANGE UP (ref 11.5–15.5)
IMM GRANULOCYTES NFR BLD AUTO: 0.3 % — SIGNIFICANT CHANGE UP (ref 0–0.9)
LYMPHOCYTES # BLD AUTO: 1.73 K/UL — SIGNIFICANT CHANGE UP (ref 1–3.3)
LYMPHOCYTES # BLD AUTO: 25.1 % — SIGNIFICANT CHANGE UP (ref 13–44)
MAGNESIUM SERPL-MCNC: 2.1 MG/DL — SIGNIFICANT CHANGE UP (ref 1.6–2.6)
MCHC RBC-ENTMCNC: 28.8 PG — SIGNIFICANT CHANGE UP (ref 27–34)
MCHC RBC-ENTMCNC: 33.7 GM/DL — SIGNIFICANT CHANGE UP (ref 32–36)
MCV RBC AUTO: 85.5 FL — SIGNIFICANT CHANGE UP (ref 80–100)
MONOCYTES # BLD AUTO: 0.62 K/UL — SIGNIFICANT CHANGE UP (ref 0–0.9)
MONOCYTES NFR BLD AUTO: 9 % — SIGNIFICANT CHANGE UP (ref 2–14)
NEUTROPHILS # BLD AUTO: 4.24 K/UL — SIGNIFICANT CHANGE UP (ref 1.8–7.4)
NEUTROPHILS NFR BLD AUTO: 61.6 % — SIGNIFICANT CHANGE UP (ref 43–77)
NRBC # BLD: 0 /100 WBCS — SIGNIFICANT CHANGE UP (ref 0–0)
PLATELET # BLD AUTO: 365 K/UL — SIGNIFICANT CHANGE UP (ref 150–400)
POTASSIUM SERPL-MCNC: 3.8 MMOL/L — SIGNIFICANT CHANGE UP (ref 3.5–5.3)
POTASSIUM SERPL-SCNC: 3.8 MMOL/L — SIGNIFICANT CHANGE UP (ref 3.5–5.3)
PROT SERPL-MCNC: 7.7 G/DL — SIGNIFICANT CHANGE UP (ref 6–8.3)
RBC # BLD: 4.96 M/UL — SIGNIFICANT CHANGE UP (ref 3.8–5.2)
RBC # FLD: 13 % — SIGNIFICANT CHANGE UP (ref 10.3–14.5)
SODIUM SERPL-SCNC: 137 MMOL/L — SIGNIFICANT CHANGE UP (ref 135–145)
TROPONIN I, HIGH SENSITIVITY RESULT: 6.4 NG/L — SIGNIFICANT CHANGE UP
WBC # BLD: 6.88 K/UL — SIGNIFICANT CHANGE UP (ref 3.8–10.5)
WBC # FLD AUTO: 6.88 K/UL — SIGNIFICANT CHANGE UP (ref 3.8–10.5)

## 2023-07-08 PROCEDURE — 99285 EMERGENCY DEPT VISIT HI MDM: CPT

## 2023-07-08 PROCEDURE — 71046 X-RAY EXAM CHEST 2 VIEWS: CPT | Mod: 26

## 2023-07-08 PROCEDURE — 85025 COMPLETE CBC W/AUTO DIFF WBC: CPT

## 2023-07-08 PROCEDURE — 83735 ASSAY OF MAGNESIUM: CPT

## 2023-07-08 PROCEDURE — 71046 X-RAY EXAM CHEST 2 VIEWS: CPT

## 2023-07-08 PROCEDURE — 99285 EMERGENCY DEPT VISIT HI MDM: CPT | Mod: 25

## 2023-07-08 PROCEDURE — 82550 ASSAY OF CK (CPK): CPT

## 2023-07-08 PROCEDURE — 80053 COMPREHEN METABOLIC PANEL: CPT

## 2023-07-08 PROCEDURE — 93005 ELECTROCARDIOGRAM TRACING: CPT

## 2023-07-08 PROCEDURE — 82553 CREATINE MB FRACTION: CPT

## 2023-07-08 PROCEDURE — 84484 ASSAY OF TROPONIN QUANT: CPT

## 2023-07-08 PROCEDURE — 93010 ELECTROCARDIOGRAM REPORT: CPT

## 2023-07-08 PROCEDURE — 36415 COLL VENOUS BLD VENIPUNCTURE: CPT

## 2023-07-08 RX ORDER — ASPIRIN/CALCIUM CARB/MAGNESIUM 324 MG
162 TABLET ORAL ONCE
Refills: 0 | Status: COMPLETED | OUTPATIENT
Start: 2023-07-08 | End: 2023-07-08

## 2023-07-08 RX ADMIN — Medication 162 MILLIGRAM(S): at 12:57

## 2023-07-08 NOTE — ED PROVIDER NOTE - CLINICAL SUMMARY MEDICAL DECISION MAKING FREE TEXT BOX
57-year-old presents with complaints of chest pain.    Vitals within normal limits on exam no acute findings.  Pain is not exertional lasted for a minute sharp in nature.  Less likely dissection, PE.  normal pulses bilaterally no neurological symptoms no active pain.  Pain is not radiating towards the back or towards the stomach.  Could be ACS, MSK, GERD.  Plan for cardiac enzymes, labs, chest x-ray will dose with aspirin.  Plan for telemetry admission versus discharge pending work-up

## 2023-07-08 NOTE — ED ADULT TRIAGE NOTE - WEIGHT IN KG
Patient here for a follow up   Denies known Latex allergy or symptoms of Latex sensitivity.  Patient brought in a list of medications no  Medications reviewed and updated.  Patients pharmacy was updated and listed yes   Patients method of receiving results was updated and listed yes        75.3

## 2023-07-08 NOTE — ED PROVIDER NOTE - PROGRESS NOTE DETAILS
[facility] Attending   Patient reassessed, no active chest pain since being in the emergency department.  Patient was offered admission to the hospital for cardiac echo and stress however at this time patient would defer admission and would like to follow-up with her cardiologist in the clinic in 2 days.  I explained to patient to come back to the hospital for any new chest pain, shortness of breath, dizziness or any new concerning symptoms as soon as possible.

## 2023-07-08 NOTE — ED ADULT NURSE NOTE - NSFALLUNIVINTERV_ED_ALL_ED
Bed/Stretcher in lowest position, wheels locked, appropriate side rails in place/Call bell, personal items and telephone in reach/Instruct patient to call for assistance before getting out of bed/chair/stretcher/Non-slip footwear applied when patient is off stretcher/[place] to call system/Physically safe environment - no spills, clutter or unnecessary equipment/Purposeful proactive rounding/Room/bathroom lighting operational, light cord in reach

## 2023-07-08 NOTE — ED PROVIDER NOTE - OBJECTIVE STATEMENT
57-year-old female history of hypertension, prediabetic presents emergency department for left-sided chest pain.  Patient states that she was working as a  when she felt 1 minute of left-sided sharp chest pain which is nonradiating.  Symptoms went away and then came back for another 2-3 episodes.  Symptoms started prior to arrival to the hospital.  Of note 2 days ago patient did have an episode of dizziness which she described as room spinning which self resolved at home.  Has a cardiologist last time she followed up was 1 year ago  pt denies any fever, chills, palpitations, sob, abdominal pain, v/d, numbness

## 2023-07-08 NOTE — ED PROVIDER NOTE - PATIENT PORTAL LINK FT
You can access the FollowMyHealth Patient Portal offered by [facility] by registering at the following website: [email or web address]. By joining [employer]’s FollowMyHealth portal, you will also be able to view your health information using other applications (apps) compatible with our system.

## 2023-07-08 NOTE — ED PROVIDER NOTE - NSFOLLOWUPINSTRUCTIONS_ED_ALL_ED_FT
[facility]  General Internal Medicine  [address] [place] [address]  Phone: [phone]  Fax:     [facility]  Internal Medicine  [address] [place] [address]  Phone: [phone]  Fax: [phone]    [facility]  Cardiology  [address] [place] [address]  Phone: [phone]    Chest Pain  WHAT YOU NEED TO KNOW:  Chest pain can be caused by a range of conditions, from not serious to life-threatening. Chest pain can be a symptom of a digestive problem, such as acid reflux or a stomach ulcer. An anxiety attack or a strong emotion, such as anger, can also cause chest pain. Infection, inflammation, or a fracture in the bones or cartilage in your chest can cause pain or discomfort. Sometimes chest pain or pressure is caused by poor blood flow to your heart (angina). Chest pain may also be caused by life-threatening conditions such as a heart attack or blood clot in your lungs.   DISCHARGE INSTRUCTIONS:  Call 911 if:   •You have any of the following signs of a heart attack: ?Squeezing, pressure, or pain in your chest  ?You may also have any of the following: ?Discomfort or pain in your back, neck, jaw, stomach, or arm  ?Shortness of breath  ?Nausea or vomiting  ?Lightheadedness or a sudden cold sweat  Seek care immediately if:   •You have chest discomfort that gets worse, even with medicine.  •You cough or vomit blood.   •Your bowel movements are black or bloody.   •You cannot stop vomiting, or it hurts to swallow.   Contact your healthcare provider if:   •You have questions or concerns about your condition or care.  Medicines:   •Medicines may be given to treat the cause of your chest pain. Examples include pain medicine, anxiety medicine, or medicines to increase blood flow to your heart.   •Do not take certain medicines without asking your healthcare provider first. These include NSAIDs, herbal or vitamin supplements, or hormones (estrogen or progestin).   •Take your medicine as directed. Contact your healthcare provider if you think your medicine is not helping or if you have side effects. Tell him or her if you are allergic to any medicine. Keep a list of the medicines, vitamins, and herbs you take. Include the amounts, and when and why you take them. Bring the list or the pill bottles to follow-up visits. Carry your medicine list with you in case of an emergency.  Follow up with your healthcare provider within 72 hours, or as directed: You may need to return for more tests to find the cause of your chest pain. You may be referred to a specialist, such as a cardiologist or gastroenterologist. Write down your questions so you remember to ask them during your visits.   Healthy living tips: The following are general healthy guidelines. If your chest pain is caused by a heart problem, your healthcare provider will give you specific guidelines to follow.  •Do not smoke. Nicotine and other chemicals in cigarettes and cigars can cause lung and heart damage. Ask your healthcare provider for information if you currently smoke and need help to quit. E-cigarettes or smokeless tobacco still contain nicotine. Talk to your healthcare provider before you use these products.   •Eat a variety of healthy, low-fat, low-salt foods. Healthy foods include fruits, vegetables, whole-grain breads, low-fat dairy products, beans, lean meats, and fish. Ask for more information about a heart healthy diet.  •Drink plenty of water every day. Your body is made of mostly water. Water helps your body to control temperature and blood pressure. Ask your healthcare provider how much water you should drink every day.  •Ask about activity. Your healthcare provider will tell you which activities to limit or avoid. Ask when you can drive, return to work, and have sex. Ask about the best exercise plan for you.  •Maintain a healthy weight. Ask your healthcare provider how much you should weigh. Ask him or her to help you create a weight loss plan if you are overweight.   If you have a stent:   •Carry your stent card with you at all times.   •Let all healthcare providers know that you have a stent.     [facility] Internal Medicine  General Internal Medicine  [address] [place] [address]  Phone: [phone]  Fax:     [facility]  Internal Medicine  [address] [place] [address]  Phone: [phone]  Fax: [phone]    [facility]  Cardiology  [address] [place] [address]  Phone: [phone]    Chest Pain  WHAT YOU NEED TO KNOW:  Chest pain can be caused by a range of conditions, from not serious to life-threatening. Chest pain can be a symptom of a digestive problem, such as acid reflux or a stomach ulcer. An anxiety attack or a strong emotion, such as anger, can also cause chest pain. Infection, inflammation, or a fracture in the bones or cartilage in your chest can cause pain or discomfort. Sometimes chest pain or pressure is caused by poor blood flow to your heart (angina). Chest pain may also be caused by life-threatening conditions such as a heart attack or blood clot in your lungs.   DISCHARGE INSTRUCTIONS:  Call 911 if:   •You have any of the following signs of a heart attack: ?Squeezing, pressure, or pain in your chest  ?You may also have any of the following: ?Discomfort or pain in your back, neck, jaw, stomach, or arm  ?Shortness of breath  ?Nausea or vomiting  ?Lightheadedness or a sudden cold sweat  Seek care immediately if:   •You have chest discomfort that gets worse, even with medicine.  •You cough or vomit blood.   •Your bowel movements are black or bloody.   •You cannot stop vomiting, or it hurts to swallow.   Contact your healthcare provider if:   •You have questions or concerns about your condition or care.  Medicines:   •Medicines may be given to treat the cause of your chest pain. Examples include pain medicine, anxiety medicine, or medicines to increase blood flow to your heart.   •Do not take certain medicines without asking your healthcare provider first. These include NSAIDs, herbal or vitamin supplements, or hormones (estrogen or progestin).   •Take your medicine as directed. Contact your healthcare provider if you think your medicine is not helping or if you have side effects. Tell him or her if you are allergic to any medicine. Keep a list of the medicines, vitamins, and herbs you take. Include the amounts, and when and why you take them. Bring the list or the pill bottles to follow-up visits. Carry your medicine list with you in case of an emergency.  Follow up with your healthcare provider within 72 hours, or as directed: You may need to return for more tests to find the cause of your chest pain. You may be referred to a specialist, such as a cardiologist or gastroenterologist. Write down your questions so you remember to ask them during your visits.   Healthy living tips: The following are general healthy guidelines. If your chest pain is caused by a heart problem, your healthcare provider will give you specific guidelines to follow.  •Do not smoke. Nicotine and other chemicals in cigarettes and cigars can cause lung and heart damage. Ask your healthcare provider for information if you currently smoke and need help to quit. E-cigarettes or smokeless tobacco still contain nicotine. Talk to your healthcare provider before you use these products.   •Eat a variety of healthy, low-fat, low-salt foods. Healthy foods include fruits, vegetables, whole-grain breads, low-fat dairy products, beans, lean meats, and fish. Ask for more information about a heart healthy diet.  •Drink plenty of water every day. Your body is made of mostly water. Water helps your body to control temperature and blood pressure. Ask your healthcare provider how much water you should drink every day.  •Ask about activity. Your healthcare provider will tell you which activities to limit or avoid. Ask when you can drive, return to work, and have sex. Ask about the best exercise plan for you.  •Maintain a healthy weight. Ask your healthcare provider how much you should weigh. Ask him or her to help you create a weight loss plan if you are overweight.   If you have a stent:   •Carry your stent card with you at all times.   •Let all healthcare providers know that you have a stent.     Dolor de pecho    LO QUE NECESITA SABER:    El dolor en el pecho puede ser provocado por atilio variedad de condiciones, algunas no tan serias y otras que son de peligro mortal. El dolor de pecho también puede ser un síntoma de un problema digestivo, jose david la acidez o atilio úlcera estomacal. Un ataque de ansiedad o atilio emoción jacques, jose david el enojo, también pueden provocar dolor de pecho. Atilio infección, inflamación o fractura en un hueso o cartílago en el pecho podría provocar dolor o molestia. En ocasiones el dolor torácico o la presión en el pecho pueden ser el resultado de geoffrey circulación de la miguelina al corazón (angina). El dolor de pecho también puede ser causado por trastornos potencialmente mortales jose david un ataque al corazón o un coágulo de miguelina en los pulmones.    INSTRUCCIONES SOBRE EL SUKHWINDER HOSPITALARIA:    Llame al número local de emergencias (911 en los [place]) o [place] a alguien que llame si:    Tiene alguno de los siguientes signos de un ataque cardíaco:  Estrujamiento, presión o tensión en nguyen pecho    Usted también podría presentar alguno de los siguientes:  Malestar o dolor en nguyen espalda, brien, mandíbula, abdomen, o brazo    Falta de aliento    Náuseas o vómitos    Desvanecimiento o sudor frío repentino    Busque atención médica de inmediato si:    La inflamación en nguyen pecho empeora, aun con tratamiento.    Usted tose o vomita miguelina.    Belkys heces son negras o tienen miguelina.    Usted no puede dejar de vomitar o le duele al tragar.  Llame a nguyen médico si:    Usted tiene preguntas o inquietudes acerca de nguyen condición o cuidado.    Medicamentos:    Los medicamentospueden administrarse para tratar la causa del dolor de pecho. Por ejemplo, analgésicos, medicamentos para la ansiedad o medicamentos para aumentar el flujo de miguelina al corazón.    No tome ciertos medicamentos sin antes preguntarle a nguyen médico.Estos incluyen ALMA, suplementos vitamínicos o a base de hierbas u hormonas (estrógeno o progestágeno).    [place] belkys medicamentos jose david se le haya indicado.Consulte con nguyen médico si usted renny que nguyen medicamento no le está ayudando o si presenta efectos secundarios. Infórmele si es alérgico a cualquier medicamento. Mantenga atilio lista actualizada de los medicamentos, las vitaminas y los productos herbales que barb. Incluya los siguientes datos de los medicamentos: cantidad, frecuencia y motivo de administración. Traiga con usted la lista o los envases de las píldoras a belkys citas de seguimiento. Lleve la lista de los medicamentos con usted en elda de atilio emergencia.  Consejos para vivir saludable:Los siguientes son consejos generales de geovani. Si se conoce la causa de nguyen dolor de pecho, nguyen médico le dará pautas específicas a seguir.    No fume.La nicotina y otros químicos contenidos en los cigarrillos y cigarros pueden causar daño a belkys pulmones y el corazón. Pida información a nguyen médico si usted actualmente fuma y necesita ayuda para dejar de fumar. Los cigarrillos electrónicos o el tabaco sin humo igualmente contienen nicotina. Consulte con nguyen médico antes de utilizar estos productos.    Elija atilio variedad de alimentos saludables tan a menudo jose david sea posible.Incluya frutas y verduras frescas, congeladas o enlatadas. También incluya productos lácteos bajos en grasa, pescado, thai (sin piel) y ravin magras. Nguyen médico o dietista pueden ayudarlo a crear planes de alimentos. Es posible que tenga que evitar ciertos alimentos o bebidas si el dolor es causado por un problema de digestión.  Alimentos saludables      Reduzca el consumo de sodio (sal).Limite el consumo de alimentos altos en sodio, jose david comidas enlatadas, bocadillos salados y embutidos. Si añade brittney cuando cocina la comida, no añada más en la bermeo. Elija alimentos enlatados bajos en sodio tanto jose david sea posible.        Consuma abundante agua al día.El agua ayuda al cuerpo a controlar la temperatura y la presión arterial. Pregunte a nguyen médico cuál es la cantidad de agua que debería consumir cada día.    Pregunte acerca de la actividad.Nguyen médico le dirá cuáles actividades limitar y cuáles evitar. Pregunte cuándo puede manejar, regresar a nguyen trabajo y tener relaciones sexuales. Pida más información acerca de un plan de ejercicio adecuado para usted.    Mantenga un peso saludable.Pregúntele a nguyen médico cuál es el peso ideal para usted. [place] que lo ayude a crear un plan seguro para bajar de peso si tiene sobrepeso.    Pregunte sobre las vacunas que pudiera necesitar.Vacúnese contra la influenza (gripe) todos los años tan pronto jose david se recomiende, normalmente en septiembre u octubre. Usted también podría necesitar la vacuna antineumocócica para evitar la neumonía. La vacuna se administra generalmente cada 5 años, a partir de los 65 años. Nguyen médico puede indicarle si debe recibir otras vacunas, y cuándo aplicárselas.  Programe atilio luis con nguyen médico dentro de 72 horas o jose david se le indique:Es posible que deba regresar para hacerse más pruebas para encontrar la causa del dolor de pecho. Es probable que lo refieran a un especialista, jose david un cardiólogo o un gastroenterólogo. Anote belkys preguntas para que se acuerde de hacerlas padilla belkys visitas.

## 2023-07-08 NOTE — ED ADULT NURSE NOTE - OBJECTIVE STATEMENT
Patient came from home with complaint of chest tightness that started in the AM. Patient denies any chest pain, dizziness, headache, SOB, fever or chills. Patient has a hx of HTN.

## 2023-07-10 ENCOUNTER — NON-APPOINTMENT (OUTPATIENT)
Age: 58
End: 2023-07-10

## 2023-07-11 ENCOUNTER — NON-APPOINTMENT (OUTPATIENT)
Age: 58
End: 2023-07-11

## 2023-07-11 ENCOUNTER — APPOINTMENT (OUTPATIENT)
Dept: CARDIOLOGY | Facility: CLINIC | Age: 58
End: 2023-07-11
Payer: MEDICAID

## 2023-07-11 VITALS
SYSTOLIC BLOOD PRESSURE: 158 MMHG | BODY MASS INDEX: 29.02 KG/M2 | WEIGHT: 170 LBS | OXYGEN SATURATION: 98 % | DIASTOLIC BLOOD PRESSURE: 98 MMHG | HEART RATE: 79 BPM | HEIGHT: 64 IN

## 2023-07-11 DIAGNOSIS — R07.9 CHEST PAIN, UNSPECIFIED: ICD-10-CM

## 2023-07-11 PROCEDURE — 99204 OFFICE O/P NEW MOD 45 MIN: CPT

## 2023-07-11 PROCEDURE — 93000 ELECTROCARDIOGRAM COMPLETE: CPT

## 2023-07-11 PROCEDURE — 93306 TTE W/DOPPLER COMPLETE: CPT

## 2023-07-11 NOTE — CARDIOLOGY SUMMARY
[de-identified] : 7/11/23  nsr inferior infarct borderline abnormal [de-identified] : 7/11/23 stress test attempted however aborted due to inability to keep with treadmill. [de-identified] : 7/11/23 ef .5-.6

## 2023-07-11 NOTE — REASON FOR VISIT
[Symptom and Test Evaluation] : symptom and test evaluation [Coronary Artery Disease] : coronary artery disease [FreeTextEntry3] : Dr Lainez [FreeTextEntry1] : The patient is a 56 yo Female complaining of chest discomfort history of hypertension, prediabetic presented to  emergency department for left-sided chest pain. CPK 69. Patient states that she was working as a  when she felt 1 minute of left-sided sharp chest pain which is nonradiating.  Symptoms went away and then came back for another 2-3 episodes. She describes a squeezing exertional chest pain.  She underwent CT angio 6/18/20 demonstrating a moderate to severe proximal left MCA stenosis. She comes today for evaluation. She  was advised to undergo a complete cardiac evaluation. She denies current chest pains shortness of breath or loss of consciousnes. A sister suffered MI at age 54. She was told of an elevated cholesterol.\par

## 2023-07-11 NOTE — ASSESSMENT
[FreeTextEntry1] : I have asked Stefanie to undergo detailed cardiac testing in order to evaluate  her  overall cardiovascular risk.\par An assessment of both structural and functional heart disease was recommended to the patient.\par In this regard, an echocardiogram and stress test were advised to the patient. She is unable to tolerate a stress test and a cardiac CTA is requested instead.I await the upcoming noninvasive cardiac testing in order to assess her overall cardiovascular risk.We discussed the pros and cons of plain treadmill stress testing nuclear stress testing and angiography including a sensitivity analysis. We discussed current ACC guidelines and the calculated 10 year risk is approximately   10%.  \par More than half of the face to face encounter of 60 minutes   was spent in counseling the patient with respect to chest pains and cardiovascular risk \par \par Quality measures \par Tobacco intervention not indicated\par Statin for prevention of cardiovascular disease probably indicated\par Hypertension compensated\par Aspirin for ischemic vascular disease not indicated\par Tobacco screening cessation and intervention not indicated\par \par Medical necessity\par This is a high encounter based upon two or more chronic illnesses with mild exacerbation requiring further management and evaluation.   .\par \par EKG is indicated for evaluation of chest pains\par \par this patient has a moderate risk for major adverese cardiac events based upon [place] risk  \par risks benefits alternatives were discussed with the patient.\par all questions were answered to their satisfaction.\par \par [place]  [id number]\par

## 2023-07-21 ENCOUNTER — OUTPATIENT (OUTPATIENT)
Dept: OUTPATIENT SERVICES | Facility: HOSPITAL | Age: 58
LOS: 1 days | End: 2023-07-21
Payer: MEDICAID

## 2023-07-21 ENCOUNTER — APPOINTMENT (OUTPATIENT)
Dept: CT IMAGING | Facility: CLINIC | Age: 58
End: 2023-07-21
Payer: MEDICAID

## 2023-07-21 DIAGNOSIS — Z90.49 ACQUIRED ABSENCE OF OTHER SPECIFIED PARTS OF DIGESTIVE TRACT: Chronic | ICD-10-CM

## 2023-07-21 DIAGNOSIS — Z98.891 HISTORY OF UTERINE SCAR FROM PREVIOUS SURGERY: Chronic | ICD-10-CM

## 2023-07-21 DIAGNOSIS — R07.9 CHEST PAIN, UNSPECIFIED: ICD-10-CM

## 2023-07-21 PROCEDURE — 75574 CT ANGIO HRT W/3D IMAGE: CPT

## 2023-07-21 PROCEDURE — 75574 CT ANGIO HRT W/3D IMAGE: CPT | Mod: 26

## 2023-07-25 ENCOUNTER — NON-APPOINTMENT (OUTPATIENT)
Age: 58
End: 2023-07-25

## 2023-08-07 NOTE — ED PROVIDER NOTE - EKG #1 DATE/TIME
26-Jan-2021 20:09 Dorsal Nasal Flap Text: The defect edges were debeveled with a #15 scalpel blade.  Given the location of the defect and the proximity to free margins a dorsal nasal flap was deemed most appropriate.  Using a sterile surgical marker, an appropriate dorsal nasal flap was drawn around the defect.    The area thus outlined was incised deep to adipose tissue with a #15 scalpel blade.  The skin margins were undermined to an appropriate distance in all directions utilizing iris scissors.

## 2023-08-09 ENCOUNTER — OUTPATIENT (OUTPATIENT)
Dept: OUTPATIENT SERVICES | Facility: HOSPITAL | Age: 58
LOS: 1 days | End: 2023-08-09
Payer: MEDICAID

## 2023-08-09 ENCOUNTER — APPOINTMENT (OUTPATIENT)
Dept: MAMMOGRAPHY | Facility: HOSPITAL | Age: 58
End: 2023-08-09
Payer: MEDICAID

## 2023-08-09 DIAGNOSIS — Z00.8 ENCOUNTER FOR OTHER GENERAL EXAMINATION: ICD-10-CM

## 2023-08-09 DIAGNOSIS — Z98.891 HISTORY OF UTERINE SCAR FROM PREVIOUS SURGERY: Chronic | ICD-10-CM

## 2023-08-09 DIAGNOSIS — Z90.49 ACQUIRED ABSENCE OF OTHER SPECIFIED PARTS OF DIGESTIVE TRACT: Chronic | ICD-10-CM

## 2023-08-09 PROCEDURE — 77063 BREAST TOMOSYNTHESIS BI: CPT | Mod: 26

## 2023-08-09 PROCEDURE — 77063 BREAST TOMOSYNTHESIS BI: CPT

## 2023-08-09 PROCEDURE — 77067 SCR MAMMO BI INCL CAD: CPT

## 2023-08-09 PROCEDURE — 77067 SCR MAMMO BI INCL CAD: CPT | Mod: 26

## 2023-08-14 ENCOUNTER — APPOINTMENT (OUTPATIENT)
Dept: OBGYN | Facility: CLINIC | Age: 58
End: 2023-08-14
Payer: MEDICAID

## 2023-08-14 VITALS
OXYGEN SATURATION: 98 % | BODY MASS INDEX: 29.02 KG/M2 | DIASTOLIC BLOOD PRESSURE: 80 MMHG | HEART RATE: 72 BPM | WEIGHT: 170 LBS | HEIGHT: 64 IN | SYSTOLIC BLOOD PRESSURE: 138 MMHG

## 2023-08-14 DIAGNOSIS — Z12.4 ENCOUNTER FOR SCREENING FOR MALIGNANT NEOPLASM OF CERVIX: ICD-10-CM

## 2023-08-14 DIAGNOSIS — N89.8 OTHER SPECIFIED NONINFLAMMATORY DISORDERS OF VAGINA: ICD-10-CM

## 2023-08-14 DIAGNOSIS — R35.0 FREQUENCY OF MICTURITION: ICD-10-CM

## 2023-08-14 DIAGNOSIS — Z01.419 ENCOUNTER FOR GYNECOLOGICAL EXAMINATION (GENERAL) (ROUTINE) W/OUT ABNORMAL FINDINGS: ICD-10-CM

## 2023-08-14 PROCEDURE — 99386 PREV VISIT NEW AGE 40-64: CPT

## 2023-08-17 NOTE — HISTORY OF PRESENT ILLNESS
[Menopause Age: ____] : age at menopause was [unfilled] [No] : Patient does not have concerns regarding sex [Currently Active] : currently active [FreeTextEntry1] : 57 yo  presents for annual w/o complaints [Y] : Positive pregnancy history [Mammogramdate] : 08/23 [ColonoscopyDate] : 2008 [PGHxTotal] : 3 [[facility] : [facility]

## 2023-08-17 NOTE — COUNSELING
[Nutrition/ Exercise/ Weight Management] : nutrition, exercise, weight management [Vitamins/Supplements] : vitamins/supplements [Breast Self Exam] : breast self exam [Bladder Hygiene] : bladder hygiene [FreeTextEntry2] : Bone health-weight bearing exercises

## 2023-08-17 NOTE — PHYSICAL EXAM
[Chaperone Present] : A chaperone was present in the examining room during all aspects of the physical examination [Appropriately responsive] : appropriately responsive [Alert] : alert [No Acute Distress] : no acute distress [No Lymphadenopathy] : no lymphadenopathy [No Murmurs] : no murmurs [Soft] : soft [Non-tender] : non-tender [Non-distended] : non-distended [No HSM] : No HSM [No Lesions] : no lesions [No Mass] : no mass [Oriented x3] : oriented x3 [Examination Of The Breasts] : a normal appearance [No Masses] : no breast masses were palpable [Labia Majora] : normal [Labia Minora] : normal [Discharge] : a  ~M vaginal discharge was present [No Bleeding] : There was no active vaginal bleeding [Normal] : normal [Uterine Adnexae] : normal [Exam Deferred] : was deferred

## 2023-08-18 DIAGNOSIS — B96.89 ACUTE VAGINITIS: ICD-10-CM

## 2023-08-18 DIAGNOSIS — N76.0 ACUTE VAGINITIS: ICD-10-CM

## 2023-08-18 RX ORDER — METRONIDAZOLE 500 MG/1
500 TABLET ORAL TWICE DAILY
Qty: 10 | Refills: 1 | Status: ACTIVE | COMMUNITY
Start: 2023-08-18 | End: 1900-01-01

## 2023-08-21 LAB
CANDIDA VAG CYTO: NOT DETECTED
CYTOLOGY CVX/VAG DOC THIN PREP: ABNORMAL
G VAGINALIS+PREV SP MTYP VAG QL MICRO: DETECTED
HPV 16 E6+E7 MRNA CVX QL NAA+PROBE: NOT DETECTED
HPV HIGH+LOW RISK DNA PNL CVX: NOT DETECTED
HPV18+45 E6+E7 MRNA CVX QL NAA+PROBE: NOT DETECTED
T VAGINALIS VAG QL WET PREP: NOT DETECTED

## 2023-08-21 RX ORDER — METRONIDAZOLE 7.5 MG/G
0.75 GEL VAGINAL
Qty: 1 | Refills: 2 | Status: ACTIVE | COMMUNITY
Start: 2023-08-21 | End: 1900-01-01

## 2023-09-03 ENCOUNTER — EMERGENCY (EMERGENCY)
Facility: HOSPITAL | Age: 58
LOS: 1 days | Discharge: HOME | End: 2023-09-03
Attending: STUDENT IN AN ORGANIZED HEALTH CARE EDUCATION/TRAINING PROGRAM | Admitting: STUDENT IN AN ORGANIZED HEALTH CARE EDUCATION/TRAINING PROGRAM
Payer: MEDICAID

## 2023-09-03 VITALS
WEIGHT: 169.98 LBS | HEART RATE: 68 BPM | OXYGEN SATURATION: 97 % | SYSTOLIC BLOOD PRESSURE: 203 MMHG | HEIGHT: 64 IN | DIASTOLIC BLOOD PRESSURE: 109 MMHG | RESPIRATION RATE: 18 BRPM | TEMPERATURE: 98 F

## 2023-09-03 VITALS
OXYGEN SATURATION: 99 % | HEART RATE: 57 BPM | RESPIRATION RATE: 18 BRPM | SYSTOLIC BLOOD PRESSURE: 209 MMHG | DIASTOLIC BLOOD PRESSURE: 78 MMHG

## 2023-09-03 DIAGNOSIS — Z90.49 ACQUIRED ABSENCE OF OTHER SPECIFIED PARTS OF DIGESTIVE TRACT: Chronic | ICD-10-CM

## 2023-09-03 DIAGNOSIS — Z98.891 HISTORY OF UTERINE SCAR FROM PREVIOUS SURGERY: Chronic | ICD-10-CM

## 2023-09-03 LAB
ALBUMIN SERPL ELPH-MCNC: 3.3 G/DL — SIGNIFICANT CHANGE UP (ref 3.3–5)
ALP SERPL-CCNC: 114 U/L — SIGNIFICANT CHANGE UP (ref 40–120)
ALT FLD-CCNC: 36 U/L — SIGNIFICANT CHANGE UP (ref 10–45)
ANION GAP SERPL CALC-SCNC: 5 MMOL/L — SIGNIFICANT CHANGE UP (ref 5–17)
AST SERPL-CCNC: 21 U/L — SIGNIFICANT CHANGE UP (ref 10–40)
BASOPHILS # BLD AUTO: 0.05 K/UL — SIGNIFICANT CHANGE UP (ref 0–0.2)
BASOPHILS NFR BLD AUTO: 0.7 % — SIGNIFICANT CHANGE UP (ref 0–2)
BILIRUB SERPL-MCNC: 0.5 MG/DL — SIGNIFICANT CHANGE UP (ref 0.2–1.2)
BUN SERPL-MCNC: 18 MG/DL — SIGNIFICANT CHANGE UP (ref 7–23)
CALCIUM SERPL-MCNC: 8.9 MG/DL — SIGNIFICANT CHANGE UP (ref 8.4–10.5)
CHLORIDE SERPL-SCNC: 104 MMOL/L — SIGNIFICANT CHANGE UP (ref 96–108)
CO2 SERPL-SCNC: 31 MMOL/L — SIGNIFICANT CHANGE UP (ref 22–31)
CREAT SERPL-MCNC: 0.66 MG/DL — SIGNIFICANT CHANGE UP (ref 0.5–1.3)
EGFR: 103 ML/MIN/1.73M2 — SIGNIFICANT CHANGE UP
EOSINOPHIL # BLD AUTO: 0.2 K/UL — SIGNIFICANT CHANGE UP (ref 0–0.5)
EOSINOPHIL NFR BLD AUTO: 2.8 % — SIGNIFICANT CHANGE UP (ref 0–6)
GLUCOSE SERPL-MCNC: 127 MG/DL — HIGH (ref 70–99)
HCT VFR BLD CALC: 43 % — SIGNIFICANT CHANGE UP (ref 34.5–45)
HGB BLD-MCNC: 14.4 G/DL — SIGNIFICANT CHANGE UP (ref 11.5–15.5)
IMM GRANULOCYTES NFR BLD AUTO: 0.3 % — SIGNIFICANT CHANGE UP (ref 0–0.9)
LYMPHOCYTES # BLD AUTO: 1.52 K/UL — SIGNIFICANT CHANGE UP (ref 1–3.3)
LYMPHOCYTES # BLD AUTO: 21.2 % — SIGNIFICANT CHANGE UP (ref 13–44)
MAGNESIUM SERPL-MCNC: 2.1 MG/DL — SIGNIFICANT CHANGE UP (ref 1.6–2.6)
MCHC RBC-ENTMCNC: 28.4 PG — SIGNIFICANT CHANGE UP (ref 27–34)
MCHC RBC-ENTMCNC: 33.5 GM/DL — SIGNIFICANT CHANGE UP (ref 32–36)
MCV RBC AUTO: 84.8 FL — SIGNIFICANT CHANGE UP (ref 80–100)
MONOCYTES # BLD AUTO: 0.67 K/UL — SIGNIFICANT CHANGE UP (ref 0–0.9)
MONOCYTES NFR BLD AUTO: 9.4 % — SIGNIFICANT CHANGE UP (ref 2–14)
NEUTROPHILS # BLD AUTO: 4.7 K/UL — SIGNIFICANT CHANGE UP (ref 1.8–7.4)
NEUTROPHILS NFR BLD AUTO: 65.6 % — SIGNIFICANT CHANGE UP (ref 43–77)
NRBC # BLD: 0 /100 WBCS — SIGNIFICANT CHANGE UP (ref 0–0)
PLATELET # BLD AUTO: 339 K/UL — SIGNIFICANT CHANGE UP (ref 150–400)
POTASSIUM SERPL-MCNC: 3.8 MMOL/L — SIGNIFICANT CHANGE UP (ref 3.5–5.3)
POTASSIUM SERPL-SCNC: 3.8 MMOL/L — SIGNIFICANT CHANGE UP (ref 3.5–5.3)
PROT SERPL-MCNC: 7.9 G/DL — SIGNIFICANT CHANGE UP (ref 6–8.3)
RBC # BLD: 5.07 M/UL — SIGNIFICANT CHANGE UP (ref 3.8–5.2)
RBC # FLD: 12.7 % — SIGNIFICANT CHANGE UP (ref 10.3–14.5)
SODIUM SERPL-SCNC: 140 MMOL/L — SIGNIFICANT CHANGE UP (ref 135–145)
TROPONIN I, HIGH SENSITIVITY RESULT: 7.1 NG/L — SIGNIFICANT CHANGE UP
WBC # BLD: 7.16 K/UL — SIGNIFICANT CHANGE UP (ref 3.8–10.5)
WBC # FLD AUTO: 7.16 K/UL — SIGNIFICANT CHANGE UP (ref 3.8–10.5)

## 2023-09-03 PROCEDURE — 36415 COLL VENOUS BLD VENIPUNCTURE: CPT

## 2023-09-03 PROCEDURE — 83735 ASSAY OF MAGNESIUM: CPT

## 2023-09-03 PROCEDURE — 93005 ELECTROCARDIOGRAM TRACING: CPT

## 2023-09-03 PROCEDURE — 99285 EMERGENCY DEPT VISIT HI MDM: CPT

## 2023-09-03 PROCEDURE — 70450 CT HEAD/BRAIN W/O DYE: CPT | Mod: MA

## 2023-09-03 PROCEDURE — 80053 COMPREHEN METABOLIC PANEL: CPT

## 2023-09-03 PROCEDURE — 85025 COMPLETE CBC W/AUTO DIFF WBC: CPT

## 2023-09-03 PROCEDURE — 99285 EMERGENCY DEPT VISIT HI MDM: CPT | Mod: 25

## 2023-09-03 PROCEDURE — 84484 ASSAY OF TROPONIN QUANT: CPT

## 2023-09-03 PROCEDURE — 70450 CT HEAD/BRAIN W/O DYE: CPT | Mod: 26,MA

## 2023-09-03 PROCEDURE — 96374 THER/PROPH/DIAG INJ IV PUSH: CPT

## 2023-09-03 PROCEDURE — 93010 ELECTROCARDIOGRAM REPORT: CPT

## 2023-09-03 RX ORDER — SODIUM CHLORIDE 9 MG/ML
1000 INJECTION INTRAMUSCULAR; INTRAVENOUS; SUBCUTANEOUS ONCE
Refills: 0 | Status: COMPLETED | OUTPATIENT
Start: 2023-09-03 | End: 2023-09-03

## 2023-09-03 RX ORDER — LOSARTAN POTASSIUM 100 MG/1
100 TABLET, FILM COATED ORAL DAILY
Refills: 0 | Status: DISCONTINUED | OUTPATIENT
Start: 2023-09-03 | End: 2023-09-07

## 2023-09-03 RX ORDER — METOCLOPRAMIDE HCL 10 MG
10 TABLET ORAL ONCE
Refills: 0 | Status: COMPLETED | OUTPATIENT
Start: 2023-09-03 | End: 2023-09-03

## 2023-09-03 RX ADMIN — Medication 10 MILLIGRAM(S): at 14:01

## 2023-09-03 RX ADMIN — SODIUM CHLORIDE 1000 MILLILITER(S): 9 INJECTION INTRAMUSCULAR; INTRAVENOUS; SUBCUTANEOUS at 14:01

## 2023-09-03 RX ADMIN — LOSARTAN POTASSIUM 100 MILLIGRAM(S): 100 TABLET, FILM COATED ORAL at 19:21

## 2023-09-03 NOTE — ED ADULT NURSE NOTE - NSFALLUNIVINTERV_ED_ALL_ED
Bed/Stretcher in lowest position, wheels locked, appropriate side rails in place/Call bell, personal items and telephone in reach/Instruct patient to call for assistance before getting out of bed/chair/stretcher/Non-slip footwear applied when patient is off stretcher/[place] to call system/Physically safe environment - no spills, clutter or unnecessary equipment/Purposeful proactive rounding/Room/bathroom lighting operational, light cord in reach

## 2023-09-03 NOTE — ED PROVIDER NOTE - PHYSICAL EXAMINATION
VITAL SIGNS: I have reviewed nursing notes and confirm.   GEN: Well-developed; well-nourished; in no acute distress. Speaking full sentences.  SKIN: Warm, pink, no rash, no diaphoresis, no cyanosis, well perfused.   HEAD: Normocephalic; atraumatic. No scalp lacerations, no abrasions.  NECK: Supple; non tender.   EYES: Pupils 3mm equal, round, reactive to light and accomodation, conjunctiva and sclera clear. Extra-ocular movements intact bilaterally.  ENT: No nasal discharge; airway clear. Trachea is midline. ORAL: No oropharyngeal exudates or erythema. Normal dentition.  CV: Regular rate and rhythm. S1, S2 normal; no murmurs, gallops, or rubs. No lower extremity pitting edema bilaterally. Capillary refill < 2 seconds throughout. Distal pulses intact 2+ throughout.  RESP: CTA bilaterally. No wheezes, rales, or rhonchi.   ABD: Normal bowel sounds, soft, non-distended, non-tender, no rebound, no guarding, no rigidity, no hepatosplenomegaly. No CVA tenderness bilaterally.  MSK: Normal range of motion and movement of all 4 extremities. No joint or muscular pain throughout. No clubbing.   BACK: No thoracolumbar midline or paravertebral tenderness. No step-offs or obvious deformities.  NEURO: Alert & oriented x 3, Grossly unremarkable. Sensory and motor intact throughout. No focal deficits. Gait: fluid; Normal speech and coordination. cerebellar ftn hts intact b/l  PSYCH: Cooperative, appropriate.

## 2023-09-03 NOTE — ED PROVIDER NOTE - PROGRESS NOTE DETAILS
ct head showing old infarct, patient without any neuro deficits. headache improved. I have discussed with the patient about the ED workup, lab results, diagnostics results, plan for discharge home, need for follow-up with primary care physician/specialists, and return precautions. At this time, the patient does not require further workup in the ED. The patient is subjectively feeling better and would like to be discharged home. The patient had the opportunity to ask questions and I have answered all inquiries. The patient verbalizes understanding and agreement with the plan. The patient is hemodynamically stable, clinically well-appearing, ambulatory, mentating well and ready for discharge home.

## 2023-09-03 NOTE — ED PROVIDER NOTE - CLINICAL SUMMARY MEDICAL DECISION MAKING FREE TEXT BOX
Based on the patient's history and physical exam, there is very low clinical suspicion for significant intracranial pathology. The headache was NOT sudden in onset, NOT maximal in onset, there are NO neurological findings, the patient does NOT have a fever, the patient does NOT have any jaw claudication, the patient does NOT endorse a clotting disorder, patient DENIES any trauma or eye pain, and the headache is NOT associated with vertigo, dizziness, or ataxia.  - Symptomatic treatment with IVF NS, acetaminophen 650 mg orally, and metoclopramide 10mg IVPB.  - CTH negative, CBC/CMP normal  - Diphenhydramine 50mg IVPB PRN dystonic reactions.  - Re-evaluation after treatment for disposition.

## 2023-09-03 NOTE — ED PROVIDER NOTE - CARE PROVIDER_API CALL
VSS. Patient with no distress or discomfort. Voiding and stooling. Infant safety bands on, mom and dad at crib side and attentive to baby cues. Breastfeeding well and frequently. Parents educated on safe sleeping habits and instructed to place baby in crib before falling asleep. Discharge instructions discussed.   
VSS. Patient with no distress or discomfort. Voiding and stooling. Infant safety bands on, mom and dad at crib side and attentive to baby cues. Safe sleeping practices reviewed and implemented. Rooming-in promoted. Breastfeeding well and frequently. Will continue to monitor infant and intervene as necessary.    
VSS. Weight down 3% from birth. O2 sats 97 & 100%. TB 6.4 @ 24 hrs, LL 12.9. Pt continues to breastfeed. Voiding overnight. Plan of care reviewed w/ parents. No new concerns expressed at this time. Will continue to monitor and intervene as necessary.       
Ethan Morales)  Neurology; Neurophysiology  [address], [address]  [place] [address]  Phone: [phone]  Fax: [phone]  Follow Up Time:

## 2023-09-03 NOTE — ED PROVIDER NOTE - OBJECTIVE STATEMENT
58F pmhx of CVA secondary to covid-19 (2020), right eye infarct?, presenting with headache and nausea today. Describes mild headache, gradual onset, starting yesterday, nonradiating, diffuse a/w nausea. Was unable to take her medication today due to vomiting/nausea. Denies any chest pain, abdominal pain, shortness of breath,  fevers, chills, diarrhea ,constipation, weakness, syncope, hematuria, dysuria, urinary symptoms, subjective neurological deficits.

## 2023-09-03 NOTE — ED PROVIDER NOTE - PATIENT PORTAL LINK FT
You can access the FollowMyHealth Patient Portal offered by [facility] by registering at the following website: [email or web address]. By joining [employer]’s FollowMyHealth portal, you will also be able to view your health information using other applications (apps) compatible with our system.

## 2023-09-06 ENCOUNTER — EMERGENCY (EMERGENCY)
Facility: HOSPITAL | Age: 58
LOS: 1 days | Discharge: HOME | End: 2023-09-06
Attending: EMERGENCY MEDICINE | Admitting: EMERGENCY MEDICINE
Payer: MEDICAID

## 2023-09-06 VITALS
SYSTOLIC BLOOD PRESSURE: 201 MMHG | WEIGHT: 169.98 LBS | DIASTOLIC BLOOD PRESSURE: 106 MMHG | OXYGEN SATURATION: 98 % | RESPIRATION RATE: 18 BRPM | TEMPERATURE: 98 F | HEIGHT: 64 IN | HEART RATE: 74 BPM

## 2023-09-06 DIAGNOSIS — Z98.891 HISTORY OF UTERINE SCAR FROM PREVIOUS SURGERY: Chronic | ICD-10-CM

## 2023-09-06 DIAGNOSIS — Z90.49 ACQUIRED ABSENCE OF OTHER SPECIFIED PARTS OF DIGESTIVE TRACT: Chronic | ICD-10-CM

## 2023-09-06 LAB
ALBUMIN SERPL ELPH-MCNC: 3.7 G/DL — SIGNIFICANT CHANGE UP (ref 3.3–5)
ALP SERPL-CCNC: 119 U/L — SIGNIFICANT CHANGE UP (ref 40–120)
ALT FLD-CCNC: 32 U/L — SIGNIFICANT CHANGE UP (ref 10–45)
ANION GAP SERPL CALC-SCNC: 9 MMOL/L — SIGNIFICANT CHANGE UP (ref 5–17)
AST SERPL-CCNC: 18 U/L — SIGNIFICANT CHANGE UP (ref 10–40)
BASOPHILS # BLD AUTO: 0.05 K/UL — SIGNIFICANT CHANGE UP (ref 0–0.2)
BASOPHILS NFR BLD AUTO: 0.6 % — SIGNIFICANT CHANGE UP (ref 0–2)
BILIRUB SERPL-MCNC: 0.6 MG/DL — SIGNIFICANT CHANGE UP (ref 0.2–1.2)
BUN SERPL-MCNC: 18 MG/DL — SIGNIFICANT CHANGE UP (ref 7–23)
CALCIUM SERPL-MCNC: 9.5 MG/DL — SIGNIFICANT CHANGE UP (ref 8.4–10.5)
CHLORIDE SERPL-SCNC: 102 MMOL/L — SIGNIFICANT CHANGE UP (ref 96–108)
CO2 SERPL-SCNC: 25 MMOL/L — SIGNIFICANT CHANGE UP (ref 22–31)
CREAT SERPL-MCNC: 0.54 MG/DL — SIGNIFICANT CHANGE UP (ref 0.5–1.3)
EGFR: 108 ML/MIN/1.73M2 — SIGNIFICANT CHANGE UP
EOSINOPHIL # BLD AUTO: 0.04 K/UL — SIGNIFICANT CHANGE UP (ref 0–0.5)
EOSINOPHIL NFR BLD AUTO: 0.5 % — SIGNIFICANT CHANGE UP (ref 0–6)
GLUCOSE SERPL-MCNC: 108 MG/DL — HIGH (ref 70–99)
HCT VFR BLD CALC: 44.5 % — SIGNIFICANT CHANGE UP (ref 34.5–45)
HGB BLD-MCNC: 15 G/DL — SIGNIFICANT CHANGE UP (ref 11.5–15.5)
IMM GRANULOCYTES NFR BLD AUTO: 0.5 % — SIGNIFICANT CHANGE UP (ref 0–0.9)
LIDOCAIN IGE QN: 27 U/L — SIGNIFICANT CHANGE UP (ref 16–77)
LYMPHOCYTES # BLD AUTO: 1.35 K/UL — SIGNIFICANT CHANGE UP (ref 1–3.3)
LYMPHOCYTES # BLD AUTO: 15.3 % — SIGNIFICANT CHANGE UP (ref 13–44)
MCHC RBC-ENTMCNC: 28.1 PG — SIGNIFICANT CHANGE UP (ref 27–34)
MCHC RBC-ENTMCNC: 33.7 GM/DL — SIGNIFICANT CHANGE UP (ref 32–36)
MCV RBC AUTO: 83.3 FL — SIGNIFICANT CHANGE UP (ref 80–100)
MONOCYTES # BLD AUTO: 0.31 K/UL — SIGNIFICANT CHANGE UP (ref 0–0.9)
MONOCYTES NFR BLD AUTO: 3.5 % — SIGNIFICANT CHANGE UP (ref 2–14)
NEUTROPHILS # BLD AUTO: 7.06 K/UL — SIGNIFICANT CHANGE UP (ref 1.8–7.4)
NEUTROPHILS NFR BLD AUTO: 79.6 % — HIGH (ref 43–77)
NRBC # BLD: 0 /100 WBCS — SIGNIFICANT CHANGE UP (ref 0–0)
PLATELET # BLD AUTO: 388 K/UL — SIGNIFICANT CHANGE UP (ref 150–400)
POTASSIUM SERPL-MCNC: 3.7 MMOL/L — SIGNIFICANT CHANGE UP (ref 3.5–5.3)
POTASSIUM SERPL-SCNC: 3.7 MMOL/L — SIGNIFICANT CHANGE UP (ref 3.5–5.3)
PROT SERPL-MCNC: 8.5 G/DL — HIGH (ref 6–8.3)
RBC # BLD: 5.34 M/UL — HIGH (ref 3.8–5.2)
RBC # FLD: 12.7 % — SIGNIFICANT CHANGE UP (ref 10.3–14.5)
SODIUM SERPL-SCNC: 136 MMOL/L — SIGNIFICANT CHANGE UP (ref 135–145)
TROPONIN I, HIGH SENSITIVITY RESULT: 7.3 NG/L — SIGNIFICANT CHANGE UP
WBC # BLD: 8.85 K/UL — SIGNIFICANT CHANGE UP (ref 3.8–10.5)
WBC # FLD AUTO: 8.85 K/UL — SIGNIFICANT CHANGE UP (ref 3.8–10.5)

## 2023-09-06 PROCEDURE — 93010 ELECTROCARDIOGRAM REPORT: CPT

## 2023-09-06 PROCEDURE — 99285 EMERGENCY DEPT VISIT HI MDM: CPT

## 2023-09-06 RX ORDER — SODIUM CHLORIDE 9 MG/ML
1000 INJECTION INTRAMUSCULAR; INTRAVENOUS; SUBCUTANEOUS ONCE
Refills: 0 | Status: COMPLETED | OUTPATIENT
Start: 2023-09-06 | End: 2023-09-06

## 2023-09-06 RX ORDER — HYDRALAZINE HCL 50 MG
10 TABLET ORAL ONCE
Refills: 0 | Status: COMPLETED | OUTPATIENT
Start: 2023-09-06 | End: 2023-09-06

## 2023-09-06 RX ORDER — LABETALOL HCL 100 MG
10 TABLET ORAL ONCE
Refills: 0 | Status: DISCONTINUED | OUTPATIENT
Start: 2023-09-06 | End: 2023-09-06

## 2023-09-06 RX ORDER — ONDANSETRON 8 MG/1
4 TABLET, FILM COATED ORAL ONCE
Refills: 0 | Status: COMPLETED | OUTPATIENT
Start: 2023-09-06 | End: 2023-09-06

## 2023-09-06 RX ADMIN — ONDANSETRON 4 MILLIGRAM(S): 8 TABLET, FILM COATED ORAL at 22:14

## 2023-09-06 RX ADMIN — SODIUM CHLORIDE 1000 MILLILITER(S): 9 INJECTION INTRAMUSCULAR; INTRAVENOUS; SUBCUTANEOUS at 22:14

## 2023-09-06 RX ADMIN — SODIUM CHLORIDE 1000 MILLILITER(S): 9 INJECTION INTRAMUSCULAR; INTRAVENOUS; SUBCUTANEOUS at 21:14

## 2023-09-06 RX ADMIN — Medication 10 MILLIGRAM(S): at 22:17

## 2023-09-06 NOTE — ED ADULT NURSE NOTE - OBJECTIVE STATEMENT
pt came in complaining of N/V x2wks. pt was last seen Sunday in the ED for same issues, blood work and CT negative. pt blood pressure was elevated, pt forgot to take her BP medication this morning. pt denies Chest pain, SOB.

## 2023-09-06 NOTE — ED ADULT NURSE REASSESSMENT NOTE - NS ED NURSE REASSESS COMMENT FT1
Pt complained of weakness, no chest pain or SOB. Re evaluated by Dr Jefferson. Will monitor condition and vitals.

## 2023-09-07 VITALS
DIASTOLIC BLOOD PRESSURE: 84 MMHG | SYSTOLIC BLOOD PRESSURE: 167 MMHG | HEART RATE: 75 BPM | RESPIRATION RATE: 18 BRPM | OXYGEN SATURATION: 97 % | TEMPERATURE: 98 F

## 2023-09-07 LAB
APPEARANCE UR: ABNORMAL
BILIRUB UR-MCNC: NEGATIVE — SIGNIFICANT CHANGE UP
COLOR SPEC: YELLOW — SIGNIFICANT CHANGE UP
DIFF PNL FLD: NEGATIVE — SIGNIFICANT CHANGE UP
GLUCOSE UR QL: NEGATIVE MG/DL — SIGNIFICANT CHANGE UP
KETONES UR-MCNC: ABNORMAL MG/DL
LEUKOCYTE ESTERASE UR-ACNC: NEGATIVE — SIGNIFICANT CHANGE UP
NITRITE UR-MCNC: NEGATIVE — SIGNIFICANT CHANGE UP
PH UR: 8 — SIGNIFICANT CHANGE UP (ref 5–8)
PROT UR-MCNC: NEGATIVE MG/DL — SIGNIFICANT CHANGE UP
SP GR SPEC: 1.01 — SIGNIFICANT CHANGE UP (ref 1–1.03)
UROBILINOGEN FLD QL: 0.2 MG/DL — SIGNIFICANT CHANGE UP (ref 0.2–1)

## 2023-09-07 PROCEDURE — 85025 COMPLETE CBC W/AUTO DIFF WBC: CPT

## 2023-09-07 PROCEDURE — 93005 ELECTROCARDIOGRAM TRACING: CPT

## 2023-09-07 PROCEDURE — 74177 CT ABD & PELVIS W/CONTRAST: CPT | Mod: MA

## 2023-09-07 PROCEDURE — 99285 EMERGENCY DEPT VISIT HI MDM: CPT | Mod: 25

## 2023-09-07 PROCEDURE — 83690 ASSAY OF LIPASE: CPT

## 2023-09-07 PROCEDURE — 96374 THER/PROPH/DIAG INJ IV PUSH: CPT | Mod: XU

## 2023-09-07 PROCEDURE — 84484 ASSAY OF TROPONIN QUANT: CPT

## 2023-09-07 PROCEDURE — 80053 COMPREHEN METABOLIC PANEL: CPT

## 2023-09-07 PROCEDURE — 96361 HYDRATE IV INFUSION ADD-ON: CPT

## 2023-09-07 PROCEDURE — 96375 TX/PRO/DX INJ NEW DRUG ADDON: CPT

## 2023-09-07 PROCEDURE — 74177 CT ABD & PELVIS W/CONTRAST: CPT | Mod: 26,MA

## 2023-09-07 PROCEDURE — 81001 URINALYSIS AUTO W/SCOPE: CPT

## 2023-09-07 PROCEDURE — 36415 COLL VENOUS BLD VENIPUNCTURE: CPT

## 2023-09-07 RX ORDER — ONDANSETRON 8 MG/1
1 TABLET, FILM COATED ORAL
Qty: 20 | Refills: 0
Start: 2023-09-07

## 2023-09-07 NOTE — ED PROVIDER NOTE - NS_EDPROVIDERDISPOUSERTYPE_ED_A_ED
THI received a call from Olimpia identifying that her mother is planning on flying to nearby country [facility] on Monday, 2/13 to apply for Visa at the [facility]. Olimpia identified that she does not have an appointment as of yet, and was hoping to just show up. THI strongly recommended making an appointment online ([email or web address]) to ensure she is able to meet with an [facility] official. THI also emailed  ( [email or web address]) to notify her of mothers plans. Per Ximena's report, if her mother has an official appointment, Ximena is able to coordinate local efforts to increase the chances of her being approved for the visitors visa. Notified Jovimoriah of this as well as left a VM on her cell phone to ensure she had all the necessary information to avoid her mothers travels to [facility] being a wasted trip. Social work will continue to assess needs and provide ongoing psychosocial support and access to resources.     BELKIS Salinas, [facility]  Pediatric Hem/Onc   Phone: [phone]  Pager: [phone]     Attending Attestation (For Attendings USE Only)...

## 2023-09-07 NOTE — ED PROVIDER NOTE - OBJECTIVE STATEMENT
58-year-old female with history of Hypertension CVA, complaining of nausea and several episodes of vomiting for the last 2 weeks associated with mild headache no diarrhea.  No black or bloody stools.  No fever or chills.  No chest pain shortness of breath. Patient did not take her blood pressure medications today.

## 2023-09-07 NOTE — ED PROVIDER NOTE - CLINICAL SUMMARY MEDICAL DECISION MAKING FREE TEXT BOX
58-year-old female with history of Hypertension CVA, complaining of nausea and several episodes of vomiting for the last 2 weeks associated with mild headache no diarrhea.  No black or bloody stools.  No fever or chills.  No chest pain shortness of breath. Patient did not take her blood pressure medications today.    Patient appears in no distress.  Blood pressure very high at triage 201/106.  However no chest pain or stroke symptoms.  Abdomen soft with mild generalized tenderness.  Possible viral syndrome.  Will check CT rule out colitis.  Patient already had CT September 3 of her head showing old left caudate infarct    Update: Labs unremarkable.  CT abdomen unremarkable.  Possible viral syndrome.  Blood pressure improved after giving hydralazine.  Advised patient to continue her blood pressure medication.  Follow-up with primary care doctor and gastroenterology.  Zofran prescribed

## 2023-09-07 NOTE — ED PROVIDER NOTE - PATIENT PORTAL LINK FT
You can access the FollowMyHealth Patient Portal offered by [facility] by registering at the following website: [email or web address]. By joining [employer]’s FollowMyHealth portal, you will also be able to view your health information using other applications (apps) compatible with our system.

## 2023-09-07 NOTE — ED PROVIDER NOTE - PHYSICAL EXAMINATION
exam:   General: well appearing, NAD.   HEENT: eyes perrl, nose normal, OP no erythema/exudate/swelling.   cor: RRR, s1s2, 2+rad pulses.   lungs: ctabl, no resp distress.   abd: soft, Mild generalized abdominal tenderness. no rebound/guarding  neuro: a&ox3, cn2-12 intact, DORSEY, 5/5 strength c nl sensation all extremities, nl coordination.   MSK: no extremity swelling.  Skin: normal, no rash

## 2023-09-07 NOTE — ED PROVIDER NOTE - CARE PROVIDER_API CALL
Reed Shane  Gastroenterology  [address] [place] [address], [address]  [place] [address]  Phone: [phone]  Fax: [phone]  Follow Up Time: 4-6 Days

## 2023-09-07 NOTE — ED PROVIDER NOTE - NSFOLLOWUPINSTRUCTIONS_ED_ALL_ED_FT
Take Zofran (ondansetron) as directed as needed for nausea  -Drink plenty of fluids  -Follow-up with gastroenterologist this week  -Return for worse vomiting, unable to drink, abdominal pain, difficulty breathing, feeling faint or other concerns    [place] Zofran (ondansetrón) según las indicaciones según sea necesario para las náuseas.  -Beber mucho líquido  -Seguimiento con gastroenterólogo esta semana.  -Regrese por vómitos peores, incapacidad para beber, dolor abdominal, dificultad para respirar, sensación de desmayo u otras preocupaciones.    Náuseas y vómitos agudos    LO QUE NECESITA SABER:    Sommer significa que las náuseas y los vómitos comienzan de forma repentina, empeoran rápidamente y dickens un período breve de tiempo. Existen muchas causas posibles de náuseas y vómitos agudos.    INSTRUCCIONES SOBRE EL SUKHWINDER HOSPITALARIA:    Llame al número de emergencias local (911 en los [place]) si:    Usted tiene dolor en el pecho.    Usted tiene dolor intenso o calambres en el abdomen.    Usted tiene visión borrosa.    Usted está confundido, tiene fiebre sukhwinder o rigidez de brien.    A usted le sale miguelina latisha brillante del recto.    El vómito huele a evacuación intestinal.  Regrese a la nicki de emergencias si:    Usted tiene dolor de sonali intenso.    Usted está mareado, tiene frío, sed y sequedad en los ojos y la boca.    Usted está orinando muy poco o nada en absoluto.    Usted tiene mareos o desvanecimiento al ponerse de pie.    Usted ve miguelina o un material que parece café molido en el vómito.  Llame a kiser médico si:    Usted continúa vomitando por más de 48 horas.    Belkys náusea y vómitos no mejoran ni desaparecen después de usar el medicamento.    Usted tiene preguntas o inquietudes acerca de kiser condición o cuidado.  Medicamentos:Es posible que usted necesite alguno de los siguientes:    Los medicamentosse puede administrar para calmarle el estómago y detener belkys vómitos. También puede necesitar medicamentos para ayudar a vaciar el estómago y los intestinos.    [place] belkys medicamentos jose david se le haya indicado.Consulte con kiser médico si usted renny que kiser medicamento no le está ayudando o si presenta efectos secundarios. Infórmele al médico si usted es alérgico a algún medicamento. Mantenga atilio lista actualizada de los medicamentos, las vitaminas y los productos herbales que barb. Incluya los siguientes datos de los medicamentos: cantidad, frecuencia y motivo de administración. Traiga con usted la lista o los envases de las píldoras a belkys citas de seguimiento. Lleve la lista de los medicamentos con usted en elda de atilio emergencia.  El manejo de belkys síntomas:    Erika reposo tanto jose david pueda.Demasiada actividad puede empeorar las náuseas.    [place] más líquidos para evitar la deshidratación.[place] pequeños sorbos. Pruebe bebidas jose david el ginger ale, la limonada, el agua o el té. Kiser médico podría recomendarle que tome atilio solución de rehidratación oral (SRO). Las soluciones de rehidratación oral contienen la cantidad adecuada de agua, sales y azúcar que necesita para restituir los líquidos que perdió kiser organismo.    Ingiera comidas más pequeñas, más a menudo.Pruebe alimentos blandos y evite las especias o los sabores pedro    No consuma alcohol.El alcohol podría causarle malestar o irritación estomacal.  Acuda a la consulta de control con kiser médico según las indicaciones:Anote belkys preguntas para que se acuerde de hacerlas padilla belkys visitas.

## 2023-09-26 ENCOUNTER — APPOINTMENT (OUTPATIENT)
Dept: GASTROENTEROLOGY | Facility: CLINIC | Age: 58
End: 2023-09-26
Payer: MEDICAID

## 2023-09-26 VITALS
TEMPERATURE: 97.8 F | HEART RATE: 65 BPM | OXYGEN SATURATION: 98 % | DIASTOLIC BLOOD PRESSURE: 93 MMHG | RESPIRATION RATE: 16 BRPM | SYSTOLIC BLOOD PRESSURE: 167 MMHG | WEIGHT: 167 LBS | BODY MASS INDEX: 28.51 KG/M2 | HEIGHT: 64 IN

## 2023-09-26 DIAGNOSIS — K21.9 GASTRO-ESOPHAGEAL REFLUX DISEASE W/OUT ESOPHAGITIS: ICD-10-CM

## 2023-09-26 DIAGNOSIS — Z12.11 ENCOUNTER FOR SCREENING FOR MALIGNANT NEOPLASM OF COLON: ICD-10-CM

## 2023-09-26 DIAGNOSIS — K29.50 UNSPECIFIED CHRONIC GASTRITIS W/OUT BLEEDING: ICD-10-CM

## 2023-09-26 PROCEDURE — 99204 OFFICE O/P NEW MOD 45 MIN: CPT

## 2023-09-26 RX ORDER — ATORVASTATIN CALCIUM 40 MG/1
40 TABLET, FILM COATED ORAL
Qty: 30 | Refills: 5 | Status: DISCONTINUED | COMMUNITY
Start: 2020-05-06 | End: 2023-09-26

## 2023-09-26 RX ORDER — SODIUM SULFATE, MAGNESIUM SULFATE, AND POTASSIUM CHLORIDE 17.75; 2.7; 2.25 G/1; G/1; G/1
1479-225-188 TABLET ORAL
Qty: 1 | Refills: 0 | Status: ACTIVE | COMMUNITY
Start: 2023-09-26 | End: 1900-01-01

## 2023-10-30 ENCOUNTER — OUTPATIENT (OUTPATIENT)
Dept: OUTPATIENT SERVICES | Facility: HOSPITAL | Age: 58
LOS: 1 days | End: 2023-10-30
Payer: MEDICAID

## 2023-10-30 ENCOUNTER — RESULT REVIEW (OUTPATIENT)
Age: 58
End: 2023-10-30

## 2023-10-30 ENCOUNTER — APPOINTMENT (OUTPATIENT)
Dept: GASTROENTEROLOGY | Facility: HOSPITAL | Age: 58
End: 2023-10-30

## 2023-10-30 DIAGNOSIS — K29.50 UNSPECIFIED CHRONIC GASTRITIS WITHOUT BLEEDING: ICD-10-CM

## 2023-10-30 DIAGNOSIS — K21.9 GASTRO-ESOPHAGEAL REFLUX DISEASE WITHOUT ESOPHAGITIS: ICD-10-CM

## 2023-10-30 DIAGNOSIS — Z12.11 ENCOUNTER FOR SCREENING FOR MALIGNANT NEOPLASM OF COLON: ICD-10-CM

## 2023-10-30 DIAGNOSIS — Z98.891 HISTORY OF UTERINE SCAR FROM PREVIOUS SURGERY: Chronic | ICD-10-CM

## 2023-10-30 DIAGNOSIS — Z90.49 ACQUIRED ABSENCE OF OTHER SPECIFIED PARTS OF DIGESTIVE TRACT: Chronic | ICD-10-CM

## 2023-10-30 PROCEDURE — 43239 EGD BIOPSY SINGLE/MULTIPLE: CPT

## 2023-10-30 PROCEDURE — 88305 TISSUE EXAM BY PATHOLOGIST: CPT

## 2023-10-30 PROCEDURE — 88305 TISSUE EXAM BY PATHOLOGIST: CPT | Mod: 26

## 2023-10-30 PROCEDURE — 88312 SPECIAL STAINS GROUP 1: CPT | Mod: 26

## 2023-10-30 PROCEDURE — 45380 COLONOSCOPY AND BIOPSY: CPT | Mod: 33

## 2023-10-30 PROCEDURE — 88312 SPECIAL STAINS GROUP 1: CPT

## 2023-10-30 PROCEDURE — 43239 EGD BIOPSY SINGLE/MULTIPLE: CPT | Mod: 59

## 2023-10-30 PROCEDURE — 45380 COLONOSCOPY AND BIOPSY: CPT | Mod: PT

## 2023-10-30 RX ORDER — SODIUM CHLORIDE 9 MG/ML
500 INJECTION INTRAMUSCULAR; INTRAVENOUS; SUBCUTANEOUS
Refills: 0 | Status: COMPLETED | OUTPATIENT
Start: 2023-10-30 | End: 2023-10-30

## 2023-10-30 RX ADMIN — SODIUM CHLORIDE 75 MILLILITER(S): 9 INJECTION INTRAMUSCULAR; INTRAVENOUS; SUBCUTANEOUS at 13:37

## 2023-11-02 LAB
SURGICAL PATHOLOGY STUDY: SIGNIFICANT CHANGE UP
SURGICAL PATHOLOGY STUDY: SIGNIFICANT CHANGE UP

## 2023-11-09 ENCOUNTER — APPOINTMENT (OUTPATIENT)
Dept: NEUROLOGY | Facility: CLINIC | Age: 58
End: 2023-11-09

## 2023-11-15 NOTE — ED ADULT TRIAGE NOTE - HEART RATE (BEATS/MIN)
Daily Note     Today's date: 11/15/2023  Patient name: Hans Hardy  : 2004  MRN: [id number]  Referring provider: Sanna Harris  Dx:   Encounter Diagnosis     ICD-10-CM    1. Rupture of right Achilles tendon, subsequent encounter  S86.011D       2. Rupture of right Achilles tendon, initial encounter  S86.011A                      Subjective: Hans Hardy reports his achilles and ankle have been feeling good with progression at PT. He states he has been able to almost tolerate full WB with eccentric heel raise. Objective: See treatment diary below      Assessment: Tolerated treatment well. Patient demonstrated fatigue post treatment and exhibited good technique with therapeutic exercises while requiring visual and verbal cues to improve hip ER recruitment and reduce valgus knee and pronation of foot during all SLS activity. He did however demo improved proprioceptive awareness of improved mechanics as session continued. Plan: Continue per plan of care. Precautions: No past medical history on file. Insurance:  [place]/CMS [place] Man #/ Referral # Total   Visits  Start date  Expiration date Visit limitation? PT only or  PT+OT?  Co[employer]urance   CMS [id number] 44 8/3/23 11/3/23          CMS  [id number]    18  10/11/23  2023                                                                        AUTH #: [id number] Date 23    Visits  Authed: 12 Used 6 7 8  9  10 11  12      Remaining        0     AUTH #: [id number]  Date 10/11 10/16 10/18 10/23 10/25 10/30 11/1 11/13 11/15    Visits  Authed: 12 Used 1 2 3 4 5 6 7 8 9      Remaining  11 10 9 8 7 6 5 4 3            Date 11/15 11/13 11/6 11/1   Visit Number [id number] 21 20 19   Manuals       Ankle PROM       IASTM       Stretch                     Neuro Re-Ed        Sit/Stand SL squat on Biodex L10 2x10 VCs SL Squat on Biodex Lvl 10 2x10 Squat on Biodex 3x10 Lvl 4 Squat on Biodex 3x10 Lvl 4 SLS  Rebounder 3-ways on BRR 3kg ball   30x ea Rebounder 3 ways on BRR 30x ea, Med ball thrust lateral 2x 30 ea BMB [place] 30x rebounder, SL sideways airex 30x    Split Squat w/ med ball flat ground  2x10 w/ raise and forward press BMB  2x10 w/ BMB   Heel raise  Standing ecc 3x10 Standing ecc 3x10 Standing ecc 3x10, SL 2x6, 2x10 on BOSU w/ UE support Standing ecc 3x10, 3 way heel raise    Weight Shift        Biodex LOS L4 2x  LOS L4 2x, WB 3x10 Lvl 4 LOS L4 2x, WB 3x10 Lvl 3   Isometrics        SLS       bosu  3x10 forward w/ plantar flexion, hold squat BMB toss 40x 3x10 lateral lunge position no tension on calf  3x10 no calf tension with foot in neutral position, lunge, hold squat with ball toss YMB 30x   Ther Ex       4 way hip       Fwd Lunge   Fwd 2x10 BOSU Lateral 2x10 on BOSU   Tidal Tank  SLS w/ RDL to arc 2x10 SLS head over arc w/ RDL SLS head over arc 2x10   Lunges   W/ tidal tank 15 ft x4 W/ tidal tank 15ft x 4          Ankle Circles       Leg Press  SL 55# 3x15 SL 3x15 50# calf press SL 3x10 50# calf press   Rockerboard       Squats       Bridge on ball       Stand hip 3 way       Forward T    2x10 w/ govind 4.0 sideways   Ankle AROM       Assessment and management        Step ups  L8 over cones forward and back. 2x10 L8 over cones forward and back.  L8 2x10 L8 2x10 w/ tidal tank   Alphabet       Ther Activity                     Gait Training       Stairs & level surfaces              Modalities 69

## 2023-12-18 NOTE — ED ADULT NURSE NOTE - PAIN RATING/NUMBER SCALE (0-10): ACTIVITY
December 18, 2023     Patient: Sara Vergara   YOB: 2005   Date of Visit: 12/18/2023       To Whom it May Concern:    Sara Vergara was seen in my clinic on 12/18/2023 at 2:40 pm.     Please excuse Sara for her absence from school on 12/13/23-12/15/23 due to her being ill.  Contact me with any further questions/concerns.    Sincerely,     Lexa Cody MD    Medical information is confidential and cannot be disclosed without the written consent of the patient or her representative.       0

## 2024-01-30 ENCOUNTER — OUTPATIENT (OUTPATIENT)
Dept: OUTPATIENT SERVICES | Facility: HOSPITAL | Age: 59
LOS: 1 days | End: 2024-01-30
Payer: MEDICAID

## 2024-01-30 DIAGNOSIS — I10 ESSENTIAL (PRIMARY) HYPERTENSION: ICD-10-CM

## 2024-01-30 DIAGNOSIS — E78.5 HYPERLIPIDEMIA, UNSPECIFIED: ICD-10-CM

## 2024-01-30 DIAGNOSIS — Z90.49 ACQUIRED ABSENCE OF OTHER SPECIFIED PARTS OF DIGESTIVE TRACT: Chronic | ICD-10-CM

## 2024-01-30 DIAGNOSIS — Z98.891 HISTORY OF UTERINE SCAR FROM PREVIOUS SURGERY: Chronic | ICD-10-CM

## 2024-01-30 LAB
24R-OH-CALCIDIOL SERPL-MCNC: 26.4 NG/ML — LOW (ref 30–80)
A1C WITH ESTIMATED AVERAGE GLUCOSE RESULT: 6.2 % — HIGH (ref 4–5.6)
ALBUMIN SERPL ELPH-MCNC: 3.4 G/DL — SIGNIFICANT CHANGE UP (ref 3.3–5)
ALBUMIN, RANDOM URINE: 17.3 MG/DL — SIGNIFICANT CHANGE UP
ALBUMIN/CREATININE RATIO (ACR): 66 MG/G — HIGH (ref 0–30)
ALP SERPL-CCNC: 159 U/L — HIGH (ref 40–120)
ALT FLD-CCNC: 27 U/L — SIGNIFICANT CHANGE UP (ref 10–45)
ANION GAP SERPL CALC-SCNC: 8 MMOL/L — SIGNIFICANT CHANGE UP (ref 5–17)
APPEARANCE UR: CLEAR — SIGNIFICANT CHANGE UP
AST SERPL-CCNC: 16 U/L — SIGNIFICANT CHANGE UP (ref 10–40)
BASOPHILS # BLD AUTO: 0.05 K/UL — SIGNIFICANT CHANGE UP (ref 0–0.2)
BASOPHILS NFR BLD AUTO: 0.8 % — SIGNIFICANT CHANGE UP (ref 0–2)
BILIRUB SERPL-MCNC: 0.6 MG/DL — SIGNIFICANT CHANGE UP (ref 0.2–1.2)
BILIRUB UR-MCNC: NEGATIVE — SIGNIFICANT CHANGE UP
BUN SERPL-MCNC: 13 MG/DL — SIGNIFICANT CHANGE UP (ref 7–23)
CALCIUM SERPL-MCNC: 9 MG/DL — SIGNIFICANT CHANGE UP (ref 8.4–10.5)
CHLORIDE SERPL-SCNC: 103 MMOL/L — SIGNIFICANT CHANGE UP (ref 96–108)
CHOLEST SERPL-MCNC: 243 MG/DL — HIGH
CO2 SERPL-SCNC: 28 MMOL/L — SIGNIFICANT CHANGE UP (ref 22–31)
COLOR SPEC: YELLOW — SIGNIFICANT CHANGE UP
CREAT ?TM UR-MCNC: 265 MG/DL — SIGNIFICANT CHANGE UP
CREAT SERPL-MCNC: 0.7 MG/DL — SIGNIFICANT CHANGE UP (ref 0.5–1.3)
DIFF PNL FLD: ABNORMAL
EGFR: 101 ML/MIN/1.73M2 — SIGNIFICANT CHANGE UP
EOSINOPHIL # BLD AUTO: 0.19 K/UL — SIGNIFICANT CHANGE UP (ref 0–0.5)
EOSINOPHIL NFR BLD AUTO: 3.1 % — SIGNIFICANT CHANGE UP (ref 0–6)
EPI CELLS # UR: 3 — SIGNIFICANT CHANGE UP
ESTIMATED AVERAGE GLUCOSE: 131 MG/DL — HIGH (ref 68–114)
GGT SERPL-CCNC: 25 U/L — SIGNIFICANT CHANGE UP (ref 8–40)
GLUCOSE SERPL-MCNC: 127 MG/DL — HIGH (ref 70–99)
GLUCOSE UR QL: NEGATIVE MG/DL — SIGNIFICANT CHANGE UP
HCT VFR BLD CALC: 46.4 % — HIGH (ref 34.5–45)
HDLC SERPL-MCNC: 33 MG/DL — LOW
HGB BLD-MCNC: 15.7 G/DL — HIGH (ref 11.5–15.5)
IMM GRANULOCYTES NFR BLD AUTO: 0.3 % — SIGNIFICANT CHANGE UP (ref 0–0.9)
KETONES UR-MCNC: ABNORMAL MG/DL
LEUKOCYTE ESTERASE UR-ACNC: ABNORMAL
LIPID PNL WITH DIRECT LDL SERPL: 184 MG/DL — HIGH
LYMPHOCYTES # BLD AUTO: 1.47 K/UL — SIGNIFICANT CHANGE UP (ref 1–3.3)
LYMPHOCYTES # BLD AUTO: 24.3 % — SIGNIFICANT CHANGE UP (ref 13–44)
MCHC RBC-ENTMCNC: 27.8 PG — SIGNIFICANT CHANGE UP (ref 27–34)
MCHC RBC-ENTMCNC: 33.8 GM/DL — SIGNIFICANT CHANGE UP (ref 32–36)
MCV RBC AUTO: 82.1 FL — SIGNIFICANT CHANGE UP (ref 80–100)
MONOCYTES # BLD AUTO: 0.48 K/UL — SIGNIFICANT CHANGE UP (ref 0–0.9)
MONOCYTES NFR BLD AUTO: 7.9 % — SIGNIFICANT CHANGE UP (ref 2–14)
NEUTROPHILS # BLD AUTO: 3.85 K/UL — SIGNIFICANT CHANGE UP (ref 1.8–7.4)
NEUTROPHILS NFR BLD AUTO: 63.6 % — SIGNIFICANT CHANGE UP (ref 43–77)
NITRITE UR-MCNC: NEGATIVE — SIGNIFICANT CHANGE UP
NON HDL CHOLESTEROL: 210 MG/DL — HIGH
NRBC # BLD: 0 /100 WBCS — SIGNIFICANT CHANGE UP (ref 0–0)
PH UR: 5.5 — SIGNIFICANT CHANGE UP (ref 5–8)
PLATELET # BLD AUTO: 370 K/UL — SIGNIFICANT CHANGE UP (ref 150–400)
POTASSIUM SERPL-MCNC: 3.6 MMOL/L — SIGNIFICANT CHANGE UP (ref 3.5–5.3)
POTASSIUM SERPL-SCNC: 3.6 MMOL/L — SIGNIFICANT CHANGE UP (ref 3.5–5.3)
PROT SERPL-MCNC: 8.2 G/DL — SIGNIFICANT CHANGE UP (ref 6–8.3)
PROT UR-MCNC: 30 MG/DL
RBC # BLD: 5.65 M/UL — HIGH (ref 3.8–5.2)
RBC # FLD: 12.9 % — SIGNIFICANT CHANGE UP (ref 10.3–14.5)
RBC CASTS # UR COMP ASSIST: 2 /HPF — SIGNIFICANT CHANGE UP (ref 0–4)
SODIUM SERPL-SCNC: 139 MMOL/L — SIGNIFICANT CHANGE UP (ref 135–145)
SP GR SPEC: 1.02 — SIGNIFICANT CHANGE UP (ref 1–1.03)
TRIGL SERPL-MCNC: 138 MG/DL — SIGNIFICANT CHANGE UP
UROBILINOGEN FLD QL: 0.2 MG/DL — SIGNIFICANT CHANGE UP (ref 0.2–1)
WBC # BLD: 6.06 K/UL — SIGNIFICANT CHANGE UP (ref 3.8–10.5)
WBC # FLD AUTO: 6.06 K/UL — SIGNIFICANT CHANGE UP (ref 3.8–10.5)
WBC UR QL: 5 /HPF — SIGNIFICANT CHANGE UP (ref 0–5)

## 2024-01-30 PROCEDURE — 80061 LIPID PANEL: CPT

## 2024-01-30 PROCEDURE — 82306 VITAMIN D 25 HYDROXY: CPT

## 2024-01-30 PROCEDURE — 82043 UR ALBUMIN QUANTITATIVE: CPT

## 2024-01-30 PROCEDURE — 82977 ASSAY OF GGT: CPT

## 2024-01-30 PROCEDURE — 81001 URINALYSIS AUTO W/SCOPE: CPT

## 2024-01-30 PROCEDURE — 83036 HEMOGLOBIN GLYCOSYLATED A1C: CPT

## 2024-01-30 PROCEDURE — 36415 COLL VENOUS BLD VENIPUNCTURE: CPT

## 2024-01-30 PROCEDURE — 80053 COMPREHEN METABOLIC PANEL: CPT

## 2024-01-30 PROCEDURE — 85025 COMPLETE CBC W/AUTO DIFF WBC: CPT

## 2024-02-08 NOTE — ED PROVIDER NOTE - NSCAREINITIATED _GEN_ER
"Documentation of the ultrasound findings, images, and interpretations will be available in the patient's Viewpoint report which is located in the imaging tab in chart review.    Maternal/Fetal Medicine Consult Note     Name: Ninfa Jackson    : 1988     MRN: [id number]     Referring Provider: Nelida Olmstead MD    Chief Complaint  cervical funneling    Subjective     History of Present Illness:  Ninfa Jackson is a 35 y.o.  13w3d who presents today for prior pregnancy losses    SHIRIN: Estimated Date of Delivery: 24     ROS:   As noted in HPI.     History reviewed. No pertinent past medical history.   Past Surgical History:   Procedure Laterality Date     SECTION PRIMARY      FTP    D & C WITH SUCTION      D & E SECOND TRIMESTER      DEEP NECK LYMPH NODE BIOPSY / EXCISION      WISDOM TOOTH EXTRACTION        OB History          6    Para   2    Term   0       2    AB   3    Living   1         SAB   3    IAB   0    Ectopic   0    Molar   0    Multiple   0    Live Births   1          Obstetric Comments   FOB #1 Pregnancy #1 SAB at 8 weeks  FOB #1 Pregnancy #2 P C/S at 34 6/7 weeks, FTP, male  FOB #1 Pregnancy #3 SAB at 7 weeks,  FOB #1 Pregnancy #4 PPROM at 18 weeks, unable to deliver all of fetus, decapitated D&E done  FOB #1 Pregnancy #5 D&E at 11 week s, Trisomy 15 after demise  FOB #1 Pregnancy #6  current                 Objective     Vital Signs  /71   Ht 170.2 cm (67\")   Wt 124 kg (273 lb)   LMP 10/28/2023 (Approximate)   Estimated body mass index is 42.76 kg/m² as calculated from the following:    Height as of this encounter: 170.2 cm (67\").    Weight as of this encounter: 124 kg (273 lb).    Physical Exam    Ultrasound Impression:   See Viewpoint    Assessment and Plan     Ninfa Jackson is a 35 y.o.  13w3d who presents today for prior pregnancy losses    Diagnoses and all orders for this visit:    1. Previous  delivery, " antepartum (Primary)  Assessment & Plan:  Spontaneous  delivery in a previous pregnancy is well documented as placing the current pregnancy at risk for prematurity.  For example, women with one previous  birth are 2-4 times more likely to deliver another  infant and women with two previous  births have a four- to sixfold increased risk for delivering  compared to multiparous women with no previous  birth. Recent studies have provided evidence for the efficacy of vaginal progesterone suppositories to prevent  birth in women with a willams gestation and the history of a previous spontaneous  birth.  The effects of progesterone on the myometrium are twofold:      It suppresses the action of estrogen by inhibiting the replacement of cytosolic estrogen receptors.    It exerts a direct effect on the biosynthetic process of the uterus through its own cellular receptor.        Women who are candidates for this therapy should have progesterone supplementation initiated between 16 and 24 weeks' gestation and continued through 36 weeks' gestation.      Patient is currently on a 200 mg dose of progesterone daily.    Orders:  -     [facility]; Future    2. 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  No records are available to confirm this.  She has been placed on prophylactic dose Lovenox for recurrent early pregnancy loss.  At the current time the date is unclear as to the necessity for anticoagulation in the face of MTHFR heterozygote's.  I would leave the continuation of this medication up to Dr. Olmstead and the patient.  Certainly if the patient gets in the third trimester with a normally grown fetus I would consider discontinuing the medication then.    Orders:  -     [facility]; Future    3.  Pregnancy, unspecified gestational age  -     [facility]; Future    4. Recurrent pregnancy loss  Assessment & Plan:  Spontaneous  delivery in a previous pregnancy is well documented as placing subsequent pregnancies at risk for prematurity. For example, women with one previous  birth are 2-4 times more likely to deliver another  infant and women with two previous  births have a four- to sixfold increased risk for delivering  compared to multiparous women with no previous  birth.     Classically, the term “cervical insufficiency” is used to describe painless cervical dilation leading to recurrent 2nd trimester pregnancy losses/births of otherwise normal pregnancies. Although structural cervical weakness is the source of some 2nd trimester losses/births, most are caused by other disorders such as decidual inflammation/infection, hemorrhage or uterine overdistension. These disorders, which are not typically recurrent, can initiate biochemical changes in the cervix that lead to premature cervical shortening and, often, pregnancy loss.     A history-based diagnosis of cervical insufficiency is made for women with ?2 consecutive prior 2nd trimester pregnancy losses associated with relatively painless early cervical dilatation or ?3 early (<34 weeks)  births in which other causes of pregnancy loss or  birth (e.g., infection, placental bleeding, multiple gestation,  labor) have been excluded. A diagnosis of cervical insufficiency based on history, ultrasound and physical exam, however, would include women with one or two prior 2nd trimester pregnancy losses or  births and cervical length <25 mm on transvaginal ultrasound examination or advanced cervical changes on physical examination before 24 weeks of gestation.     The majority of women with suspected cervical insufficiency do not meet criteria for a history-based diagnosis of  cervical insufficiency. For these patients vaginal progesterone is indicated. The effects of progesterone on the myometrium are twofold: It suppresses the action of estrogen by inhibiting the replacement of cytosolic estrogen receptors and it exerts a direct effect on the biosynthetic process of the uterus through its own cellular receptor.     [place] is a 17-OHP preparation approved in  by the [place] Food and Drug Administration (FDA) to reduce the risk of recurrent  birth in pregnant people with a willams pregnancy who have a history of a prior spontaneous  delivery.      Women who are candidates for progesterone supplementation should have therapy initiated between 16 and 24 weeks' gestation and continued through 36 weeks' gestation.    Cervical length screening is usually initiated at 14 weeks, but screening may be started as early as 12 weeks in women with early 2nd trimester losses, recurrent 2nd trimester losses or a prior large cold-knife conization. Ultrasound examination is generally repeated every two weeks until 22 weeks as long as the cervical length is ?2.5mm,  with the expectation that  cervical changes will precede overt  labor or membrane rupture symptoms by 3-6 weeks (Iams 2011). Cervical cerclage placement would be offered for a cervical length that decreases to less than 2.5 mm on serial cervical evaluation. Ultrasound screening is usually discontinued at 22 weeks of gestation, as cerclage is rarely performed after this time.    Orders:  -     [facility]; Future         Follow Up  Return in about 4 weeks (around 3/7/2024).    I spent 30 minutes caring for the patient on the day of service. This included: obtaining or reviewing a separately obtained medical history, reviewing patient records, performing a medically appropriate exam and/or evaluation, counseling or educating the patient/family/caregiver, ordering medications, labs, and/or  procedures and documenting such in the medical record. This does not include time spent on review and interpretation of other tests such as fetal ultrasound or the performance of other procedures such as amniocentesis or CVS.      Douglas A. Milligan, MD  Maternal Fetal Medicine, [facility]     2024   Mayo Kwok(Attending)

## 2024-02-19 NOTE — ED ADULT TRIAGE NOTE - ISOLATION TYPE:
Jen has Covid and is not feeling well. Mom would like to see if there is anything that she can do for her cough and congestion. Mom also has Covid.     
Left voicemail for mom regarding tips and tricks to help with Poppy's nasal congestion if she calls back please let her know that I advised the following for congestion:  For treatment of nasal congestion you can use a humidifier, hot steam from the shower- have you sit on the toilet with the child sitting up right in your lap breathing in the steam. You can do this for 10-15 minutes at a time few times a day. Also can use nasal suctioning with bulb syringe or nose kaiser, and saline nasal spray to help clear the sinuses- a brand is little remedies. Also can have the child not lay flat, try to have them lay slightly higher with adjusting the bed or even putting some phone books or text books under the mattress under the head of the bed. This will help the mucous drain and let the lungs expand more for better breathing. I usually have the parent monitor these symptoms for 7-10 days up to 2 weeks. If the symptoms linger past that or worsen prior please let us know!  
None

## 2024-03-05 ENCOUNTER — APPOINTMENT (OUTPATIENT)
Dept: NEUROLOGY | Facility: CLINIC | Age: 59
End: 2024-03-05

## 2024-05-24 ENCOUNTER — OUTPATIENT (OUTPATIENT)
Dept: OUTPATIENT SERVICES | Facility: HOSPITAL | Age: 59
LOS: 1 days | End: 2024-05-24
Payer: MEDICAID

## 2024-05-24 DIAGNOSIS — Z90.49 ACQUIRED ABSENCE OF OTHER SPECIFIED PARTS OF DIGESTIVE TRACT: Chronic | ICD-10-CM

## 2024-05-24 DIAGNOSIS — Z98.891 HISTORY OF UTERINE SCAR FROM PREVIOUS SURGERY: Chronic | ICD-10-CM

## 2024-05-24 DIAGNOSIS — R73.09 OTHER ABNORMAL GLUCOSE: ICD-10-CM

## 2024-05-24 DIAGNOSIS — E78.2 MIXED HYPERLIPIDEMIA: ICD-10-CM

## 2024-05-24 LAB
24R-OH-CALCIDIOL SERPL-MCNC: 27.9 NG/ML — LOW (ref 30–80)
A1C WITH ESTIMATED AVERAGE GLUCOSE RESULT: 6.6 % — HIGH (ref 4–5.6)
ALBUMIN SERPL ELPH-MCNC: 3.5 G/DL — SIGNIFICANT CHANGE UP (ref 3.3–5)
ALBUMIN, RANDOM URINE: 14 MG/DL — SIGNIFICANT CHANGE UP
ALBUMIN/CREATININE RATIO (ACR): 67 MG/G — HIGH (ref 0–30)
ALP SERPL-CCNC: 156 U/L — HIGH (ref 40–120)
ALT FLD-CCNC: 34 U/L — SIGNIFICANT CHANGE UP (ref 10–45)
ANION GAP SERPL CALC-SCNC: 6 MMOL/L — SIGNIFICANT CHANGE UP (ref 5–17)
AST SERPL-CCNC: 16 U/L — SIGNIFICANT CHANGE UP (ref 10–40)
BASOPHILS # BLD AUTO: 0.06 K/UL — SIGNIFICANT CHANGE UP (ref 0–0.2)
BASOPHILS NFR BLD AUTO: 0.9 % — SIGNIFICANT CHANGE UP (ref 0–2)
BILIRUB SERPL-MCNC: 0.7 MG/DL — SIGNIFICANT CHANGE UP (ref 0.2–1.2)
BUN SERPL-MCNC: 15 MG/DL — SIGNIFICANT CHANGE UP (ref 7–23)
CALCIUM SERPL-MCNC: 8.6 MG/DL — SIGNIFICANT CHANGE UP (ref 8.4–10.5)
CHLORIDE SERPL-SCNC: 103 MMOL/L — SIGNIFICANT CHANGE UP (ref 96–108)
CHOLEST SERPL-MCNC: 249 MG/DL — HIGH
CO2 SERPL-SCNC: 30 MMOL/L — SIGNIFICANT CHANGE UP (ref 22–31)
CREAT ?TM UR-MCNC: 207 MG/DL — SIGNIFICANT CHANGE UP
CREAT SERPL-MCNC: 0.64 MG/DL — SIGNIFICANT CHANGE UP (ref 0.5–1.3)
CRP SERPL-MCNC: <3 MG/L — SIGNIFICANT CHANGE UP
EGFR: 104 ML/MIN/1.73M2 — SIGNIFICANT CHANGE UP
EOSINOPHIL # BLD AUTO: 0.31 K/UL — SIGNIFICANT CHANGE UP (ref 0–0.5)
EOSINOPHIL NFR BLD AUTO: 4.4 % — SIGNIFICANT CHANGE UP (ref 0–6)
ERYTHROCYTE [SEDIMENTATION RATE] IN BLOOD: 23 MM/HR — HIGH (ref 0–20)
ESTIMATED AVERAGE GLUCOSE: 143 MG/DL — HIGH (ref 68–114)
FERRITIN SERPL-MCNC: 106 NG/ML — SIGNIFICANT CHANGE UP (ref 13–330)
GGT SERPL-CCNC: 23 U/L — SIGNIFICANT CHANGE UP (ref 8–40)
GLUCOSE SERPL-MCNC: 138 MG/DL — HIGH (ref 70–99)
HCT VFR BLD CALC: 44.2 % — SIGNIFICANT CHANGE UP (ref 34.5–45)
HDLC SERPL-MCNC: 32 MG/DL — LOW
HGB BLD-MCNC: 15.6 G/DL — HIGH (ref 11.5–15.5)
IMM GRANULOCYTES NFR BLD AUTO: 0.1 % — SIGNIFICANT CHANGE UP (ref 0–0.9)
IRON SATN MFR SERPL: 28 % — SIGNIFICANT CHANGE UP (ref 14–50)
IRON SATN MFR SERPL: 97 UG/DL — SIGNIFICANT CHANGE UP (ref 30–160)
LIPID PNL WITH DIRECT LDL SERPL: 192 MG/DL — HIGH
LYMPHOCYTES # BLD AUTO: 1.5 K/UL — SIGNIFICANT CHANGE UP (ref 1–3.3)
LYMPHOCYTES # BLD AUTO: 21.4 % — SIGNIFICANT CHANGE UP (ref 13–44)
MCHC RBC-ENTMCNC: 28.9 PG — SIGNIFICANT CHANGE UP (ref 27–34)
MCHC RBC-ENTMCNC: 35.3 GM/DL — SIGNIFICANT CHANGE UP (ref 32–36)
MCV RBC AUTO: 81.9 FL — SIGNIFICANT CHANGE UP (ref 80–100)
MONOCYTES # BLD AUTO: 0.55 K/UL — SIGNIFICANT CHANGE UP (ref 0–0.9)
MONOCYTES NFR BLD AUTO: 7.8 % — SIGNIFICANT CHANGE UP (ref 2–14)
NEUTROPHILS # BLD AUTO: 4.58 K/UL — SIGNIFICANT CHANGE UP (ref 1.8–7.4)
NEUTROPHILS NFR BLD AUTO: 65.4 % — SIGNIFICANT CHANGE UP (ref 43–77)
NON HDL CHOLESTEROL: 217 MG/DL — HIGH
NRBC # BLD: 0 /100 WBCS — SIGNIFICANT CHANGE UP (ref 0–0)
PLATELET # BLD AUTO: 359 K/UL — SIGNIFICANT CHANGE UP (ref 150–400)
POTASSIUM SERPL-MCNC: 3.9 MMOL/L — SIGNIFICANT CHANGE UP (ref 3.5–5.3)
POTASSIUM SERPL-SCNC: 3.9 MMOL/L — SIGNIFICANT CHANGE UP (ref 3.5–5.3)
PROT SERPL-MCNC: 7.8 G/DL — SIGNIFICANT CHANGE UP (ref 6–8.3)
RBC # BLD: 5.4 M/UL — HIGH (ref 3.8–5.2)
RBC # FLD: 12.8 % — SIGNIFICANT CHANGE UP (ref 10.3–14.5)
SODIUM SERPL-SCNC: 139 MMOL/L — SIGNIFICANT CHANGE UP (ref 135–145)
T4 FREE SERPL-MCNC: 1.3 NG/DL — SIGNIFICANT CHANGE UP (ref 0.9–1.8)
TIBC SERPL-MCNC: 342 UG/DL — SIGNIFICANT CHANGE UP (ref 220–430)
TRIGL SERPL-MCNC: 134 MG/DL — SIGNIFICANT CHANGE UP
TSH SERPL-MCNC: 2.87 UIU/ML — SIGNIFICANT CHANGE UP (ref 0.36–3.74)
UIBC SERPL-MCNC: 245 UG/DL — SIGNIFICANT CHANGE UP (ref 110–370)
URATE SERPL-MCNC: 5.1 MG/DL — SIGNIFICANT CHANGE UP (ref 2.5–7)
VIT B12 SERPL-MCNC: 700 PG/ML — SIGNIFICANT CHANGE UP (ref 232–1245)
WBC # BLD: 7.01 K/UL — SIGNIFICANT CHANGE UP (ref 3.8–10.5)
WBC # FLD AUTO: 7.01 K/UL — SIGNIFICANT CHANGE UP (ref 3.8–10.5)

## 2024-05-24 PROCEDURE — 84443 ASSAY THYROID STIM HORMONE: CPT

## 2024-05-24 PROCEDURE — 83036 HEMOGLOBIN GLYCOSYLATED A1C: CPT

## 2024-05-24 PROCEDURE — 82728 ASSAY OF FERRITIN: CPT

## 2024-05-24 PROCEDURE — 82043 UR ALBUMIN QUANTITATIVE: CPT

## 2024-05-24 PROCEDURE — 85652 RBC SED RATE AUTOMATED: CPT

## 2024-05-24 PROCEDURE — 82607 VITAMIN B-12: CPT

## 2024-05-24 PROCEDURE — 80053 COMPREHEN METABOLIC PANEL: CPT

## 2024-05-24 PROCEDURE — 84550 ASSAY OF BLOOD/URIC ACID: CPT

## 2024-05-24 PROCEDURE — 85025 COMPLETE CBC W/AUTO DIFF WBC: CPT

## 2024-05-24 PROCEDURE — 83550 IRON BINDING TEST: CPT

## 2024-05-24 PROCEDURE — 82306 VITAMIN D 25 HYDROXY: CPT

## 2024-05-24 PROCEDURE — 83540 ASSAY OF IRON: CPT

## 2024-05-24 PROCEDURE — 84439 ASSAY OF FREE THYROXINE: CPT

## 2024-05-24 PROCEDURE — 80061 LIPID PANEL: CPT

## 2024-05-24 PROCEDURE — 86140 C-REACTIVE PROTEIN: CPT

## 2024-05-24 PROCEDURE — 82977 ASSAY OF GGT: CPT

## 2024-05-24 PROCEDURE — 87086 URINE CULTURE/COLONY COUNT: CPT

## 2024-05-24 PROCEDURE — 36415 COLL VENOUS BLD VENIPUNCTURE: CPT

## 2024-05-26 LAB
CULTURE RESULTS: SIGNIFICANT CHANGE UP
SPECIMEN SOURCE: SIGNIFICANT CHANGE UP

## 2024-05-30 ENCOUNTER — OUTPATIENT (OUTPATIENT)
Dept: OUTPATIENT SERVICES | Facility: HOSPITAL | Age: 59
LOS: 1 days | End: 2024-05-30
Payer: COMMERCIAL

## 2024-05-30 ENCOUNTER — APPOINTMENT (OUTPATIENT)
Dept: ULTRASOUND IMAGING | Facility: IMAGING CENTER | Age: 59
End: 2024-05-30
Payer: COMMERCIAL

## 2024-05-30 DIAGNOSIS — Z90.49 ACQUIRED ABSENCE OF OTHER SPECIFIED PARTS OF DIGESTIVE TRACT: Chronic | ICD-10-CM

## 2024-05-30 DIAGNOSIS — Z98.891 HISTORY OF UTERINE SCAR FROM PREVIOUS SURGERY: Chronic | ICD-10-CM

## 2024-05-30 DIAGNOSIS — Z00.8 ENCOUNTER FOR OTHER GENERAL EXAMINATION: ICD-10-CM

## 2024-05-30 PROCEDURE — 93970 EXTREMITY STUDY: CPT

## 2024-05-30 PROCEDURE — 93970 EXTREMITY STUDY: CPT | Mod: 26

## 2024-06-10 NOTE — ED ADULT NURSE NOTE - NS ED NURSE LEVEL OF CONSCIOUSNESS AFFECT
No protocol for requested medication.    Medication: Ozempic,   Last office visit date: 2/21/24  Next OV: not sched- PSR please schedule f/u and route back once sched.   Pharmacy: [id number] [address]    Order pended, routed to clinician for review.      Calm

## 2024-09-09 ENCOUNTER — OUTPATIENT (OUTPATIENT)
Dept: OUTPATIENT SERVICES | Facility: HOSPITAL | Age: 59
LOS: 1 days | End: 2024-09-09
Payer: MEDICAID

## 2024-09-09 DIAGNOSIS — Z98.891 HISTORY OF UTERINE SCAR FROM PREVIOUS SURGERY: Chronic | ICD-10-CM

## 2024-09-09 DIAGNOSIS — E55.9 VITAMIN D DEFICIENCY, UNSPECIFIED: ICD-10-CM

## 2024-09-09 DIAGNOSIS — I10 ESSENTIAL (PRIMARY) HYPERTENSION: ICD-10-CM

## 2024-09-09 DIAGNOSIS — Z90.49 ACQUIRED ABSENCE OF OTHER SPECIFIED PARTS OF DIGESTIVE TRACT: Chronic | ICD-10-CM

## 2024-09-09 LAB
24R-OH-CALCIDIOL SERPL-MCNC: 31.7 NG/ML — SIGNIFICANT CHANGE UP (ref 30–80)
A1C WITH ESTIMATED AVERAGE GLUCOSE RESULT: 7.2 % — HIGH (ref 4–5.6)
ALBUMIN SERPL ELPH-MCNC: 3.6 G/DL — SIGNIFICANT CHANGE UP (ref 3.3–5)
ALBUMIN, RANDOM URINE: 5.7 MG/DL — SIGNIFICANT CHANGE UP
ALBUMIN/CREATININE RATIO (ACR): 22 MG/G — SIGNIFICANT CHANGE UP (ref 0–30)
ALP SERPL-CCNC: 176 U/L — HIGH (ref 40–120)
ALT FLD-CCNC: 35 U/L — SIGNIFICANT CHANGE UP (ref 10–45)
ANION GAP SERPL CALC-SCNC: 6 MMOL/L — SIGNIFICANT CHANGE UP (ref 5–17)
APPEARANCE UR: CLEAR — SIGNIFICANT CHANGE UP
AST SERPL-CCNC: 17 U/L — SIGNIFICANT CHANGE UP (ref 10–40)
BACTERIA # UR AUTO: ABNORMAL /HPF
BASOPHILS # BLD AUTO: 0.05 K/UL — SIGNIFICANT CHANGE UP (ref 0–0.2)
BASOPHILS NFR BLD AUTO: 0.7 % — SIGNIFICANT CHANGE UP (ref 0–2)
BILIRUB SERPL-MCNC: 0.6 MG/DL — SIGNIFICANT CHANGE UP (ref 0.2–1.2)
BILIRUB UR-MCNC: NEGATIVE — SIGNIFICANT CHANGE UP
BUN SERPL-MCNC: 13 MG/DL — SIGNIFICANT CHANGE UP (ref 7–23)
CALCIUM SERPL-MCNC: 9.1 MG/DL — SIGNIFICANT CHANGE UP (ref 8.4–10.5)
CHLORIDE SERPL-SCNC: 103 MMOL/L — SIGNIFICANT CHANGE UP (ref 96–108)
CHOLEST SERPL-MCNC: 159 MG/DL — SIGNIFICANT CHANGE UP
CO2 SERPL-SCNC: 30 MMOL/L — SIGNIFICANT CHANGE UP (ref 22–31)
COLOR SPEC: SIGNIFICANT CHANGE UP
CREAT ?TM UR-MCNC: 255 MG/DL — SIGNIFICANT CHANGE UP
CREAT SERPL-MCNC: 0.68 MG/DL — SIGNIFICANT CHANGE UP (ref 0.5–1.3)
DIFF PNL FLD: ABNORMAL
EGFR: 101 ML/MIN/1.73M2 — SIGNIFICANT CHANGE UP
EOSINOPHIL # BLD AUTO: 0.26 K/UL — SIGNIFICANT CHANGE UP (ref 0–0.5)
EOSINOPHIL NFR BLD AUTO: 3.8 % — SIGNIFICANT CHANGE UP (ref 0–6)
ESTIMATED AVERAGE GLUCOSE: 160 MG/DL — HIGH (ref 68–114)
FT4I SERPL CALC-MCNC: 3.5 FTI% — SIGNIFICANT CHANGE UP (ref 1.4–4.8)
FT4I SERPL CALC-MCNC: 9.8 INDEX — SIGNIFICANT CHANGE UP (ref 4.4–11.4)
GGT SERPL-CCNC: 27 U/L — SIGNIFICANT CHANGE UP (ref 8–40)
GLUCOSE SERPL-MCNC: 142 MG/DL — HIGH (ref 70–99)
GLUCOSE UR QL: NEGATIVE MG/DL — SIGNIFICANT CHANGE UP
HCT VFR BLD CALC: 44 % — SIGNIFICANT CHANGE UP (ref 34.5–45)
HDLC SERPL-MCNC: 30 MG/DL — LOW
HGB BLD-MCNC: 15 G/DL — SIGNIFICANT CHANGE UP (ref 11.5–15.5)
IMM GRANULOCYTES NFR BLD AUTO: 0.3 % — SIGNIFICANT CHANGE UP (ref 0–0.9)
KETONES UR-MCNC: NEGATIVE MG/DL — SIGNIFICANT CHANGE UP
LDLC SERPL DIRECT ASSAY-MCNC: 113 MG/DL — HIGH
LEUKOCYTE ESTERASE UR-ACNC: ABNORMAL
LIPID PNL WITH DIRECT LDL SERPL: 111 MG/DL — HIGH
LYMPHOCYTES # BLD AUTO: 1.56 K/UL — SIGNIFICANT CHANGE UP (ref 1–3.3)
LYMPHOCYTES # BLD AUTO: 22.8 % — SIGNIFICANT CHANGE UP (ref 13–44)
MCHC RBC-ENTMCNC: 28.2 PG — SIGNIFICANT CHANGE UP (ref 27–34)
MCHC RBC-ENTMCNC: 34.1 GM/DL — SIGNIFICANT CHANGE UP (ref 32–36)
MCV RBC AUTO: 82.7 FL — SIGNIFICANT CHANGE UP (ref 80–100)
MONOCYTES # BLD AUTO: 0.51 K/UL — SIGNIFICANT CHANGE UP (ref 0–0.9)
MONOCYTES NFR BLD AUTO: 7.4 % — SIGNIFICANT CHANGE UP (ref 2–14)
NEUTROPHILS # BLD AUTO: 4.45 K/UL — SIGNIFICANT CHANGE UP (ref 1.8–7.4)
NEUTROPHILS NFR BLD AUTO: 65 % — SIGNIFICANT CHANGE UP (ref 43–77)
NITRITE UR-MCNC: NEGATIVE — SIGNIFICANT CHANGE UP
NON HDL CHOLESTEROL: 128 MG/DL — SIGNIFICANT CHANGE UP
NRBC # BLD: 0 /100 WBCS — SIGNIFICANT CHANGE UP (ref 0–0)
PH UR: 6 — SIGNIFICANT CHANGE UP (ref 5–8)
PLATELET # BLD AUTO: 382 K/UL — SIGNIFICANT CHANGE UP (ref 150–400)
POTASSIUM SERPL-MCNC: 4.1 MMOL/L — SIGNIFICANT CHANGE UP (ref 3.5–5.3)
POTASSIUM SERPL-SCNC: 4.1 MMOL/L — SIGNIFICANT CHANGE UP (ref 3.5–5.3)
PROT SERPL-MCNC: 8 G/DL — SIGNIFICANT CHANGE UP (ref 6–8.3)
PROT UR-MCNC: 30 MG/DL
RBC # BLD: 5.32 M/UL — HIGH (ref 3.8–5.2)
RBC # FLD: 12.7 % — SIGNIFICANT CHANGE UP (ref 10.3–14.5)
RBC CASTS # UR COMP ASSIST: 1 /HPF — SIGNIFICANT CHANGE UP (ref 0–4)
SODIUM SERPL-SCNC: 139 MMOL/L — SIGNIFICANT CHANGE UP (ref 135–145)
SP GR SPEC: 1.02 — SIGNIFICANT CHANGE UP (ref 1–1.03)
T3 SERPL-MCNC: 146 NG/DL — SIGNIFICANT CHANGE UP (ref 80–200)
T3/T3 UPTAKE INDEX SERPL-RTO: 36 % — SIGNIFICANT CHANGE UP (ref 28–41)
T3FREE SERPL-MCNC: 3.78 PG/ML — SIGNIFICANT CHANGE UP (ref 2–4.4)
T4 AB SER-ACNC: 9.8 UG/DL — SIGNIFICANT CHANGE UP (ref 4.6–12)
T4 FREE SERPL-MCNC: 1.6 NG/DL — SIGNIFICANT CHANGE UP (ref 0.9–1.8)
T4/T3 UPTAKE INDEX SERPL: 1 TBI — SIGNIFICANT CHANGE UP (ref 0.8–1.3)
TRIGL SERPL-MCNC: 91 MG/DL — SIGNIFICANT CHANGE UP
TSH SERPL-MCNC: 3.39 UIU/ML — SIGNIFICANT CHANGE UP (ref 0.36–3.74)
UROBILINOGEN FLD QL: 1 MG/DL — SIGNIFICANT CHANGE UP (ref 0.2–1)
VIT B12 SERPL-MCNC: 575 PG/ML — SIGNIFICANT CHANGE UP (ref 232–1245)
WBC # BLD: 6.85 K/UL — SIGNIFICANT CHANGE UP (ref 3.8–10.5)
WBC # FLD AUTO: 6.85 K/UL — SIGNIFICANT CHANGE UP (ref 3.8–10.5)
WBC UR QL: 0 /HPF — SIGNIFICANT CHANGE UP (ref 0–5)

## 2024-09-09 PROCEDURE — 80061 LIPID PANEL: CPT

## 2024-09-09 PROCEDURE — 84439 ASSAY OF FREE THYROXINE: CPT

## 2024-09-09 PROCEDURE — 84479 ASSAY OF THYROID (T3 OR T4): CPT

## 2024-09-09 PROCEDURE — 82306 VITAMIN D 25 HYDROXY: CPT

## 2024-09-09 PROCEDURE — 36415 COLL VENOUS BLD VENIPUNCTURE: CPT

## 2024-09-09 PROCEDURE — 80053 COMPREHEN METABOLIC PANEL: CPT

## 2024-09-09 PROCEDURE — 83721 ASSAY OF BLOOD LIPOPROTEIN: CPT

## 2024-09-09 PROCEDURE — 84481 FREE ASSAY (FT-3): CPT

## 2024-09-09 PROCEDURE — 85025 COMPLETE CBC W/AUTO DIFF WBC: CPT

## 2024-09-09 PROCEDURE — 83036 HEMOGLOBIN GLYCOSYLATED A1C: CPT

## 2024-09-09 PROCEDURE — 81001 URINALYSIS AUTO W/SCOPE: CPT

## 2024-09-09 PROCEDURE — 84436 ASSAY OF TOTAL THYROXINE: CPT

## 2024-09-09 PROCEDURE — 82607 VITAMIN B-12: CPT

## 2024-09-09 PROCEDURE — 84480 ASSAY TRIIODOTHYRONINE (T3): CPT

## 2024-09-09 PROCEDURE — 87086 URINE CULTURE/COLONY COUNT: CPT

## 2024-09-09 PROCEDURE — 82043 UR ALBUMIN QUANTITATIVE: CPT

## 2024-09-09 PROCEDURE — 84443 ASSAY THYROID STIM HORMONE: CPT

## 2024-09-09 PROCEDURE — 82977 ASSAY OF GGT: CPT

## 2024-09-10 LAB
CULTURE RESULTS: SIGNIFICANT CHANGE UP
SPECIMEN SOURCE: SIGNIFICANT CHANGE UP

## 2024-09-19 NOTE — ED PROVIDER NOTE - CONSTITUTIONAL APPEARANCE HYGIENE, MLM
9/19/24: Spoke with patient's , Ed that confirmed that Kim is staying a [facility] now and will most likely stay there for the next 2-3 weeks. Kim is unable to leave facility and will not be able to make clinic appointment tomorrow.     Ed reports that they have her [employer] monitor in her room and he will plug it in today. Plan to do remote device check in place of clinic visit tomorrow.    Kim still has outer bandage and steri strips in place. Ed will have [facility] nurses remove dressings to incision and put photo in patients chart. Instructed to keep incision open to air.    Also reviewed incision care and monitoring, activity restrictions, remote monitoring, routine follow-up, and when to call the clinic.    Device clinic number [phone] given to Ed to contact with further questions or concerns. Ed verbalized understanding and will contact clinic if needed.    Debbi Dempsey RN   well appearing

## 2024-12-05 NOTE — ED PROVIDER NOTE - NS_EDPROVIDERDISPOUSERTYPE_ED_A_ED
----- Message from Emely Haney MD sent at 12/5/2024  1:14 PM CST -----  Contact patient.  No evidence of rib fracture.  There is a new opacity in the right upper lung which could be due to infection.  The antibiotic that I prescribed should take care of this.  
Attending Attestation (For Attendings USE Only)...

## 2025-02-26 ENCOUNTER — OUTPATIENT (OUTPATIENT)
Dept: OUTPATIENT SERVICES | Facility: HOSPITAL | Age: 60
LOS: 1 days | End: 2025-02-26
Payer: COMMERCIAL

## 2025-02-26 ENCOUNTER — APPOINTMENT (OUTPATIENT)
Age: 60
End: 2025-02-26

## 2025-02-26 VITALS
HEART RATE: 64 BPM | RESPIRATION RATE: 16 BRPM | OXYGEN SATURATION: 100 % | SYSTOLIC BLOOD PRESSURE: 201 MMHG | TEMPERATURE: 98.3 F | DIASTOLIC BLOOD PRESSURE: 99 MMHG

## 2025-02-26 VITALS — DIASTOLIC BLOOD PRESSURE: 105 MMHG | SYSTOLIC BLOOD PRESSURE: 216 MMHG

## 2025-02-26 VITALS — DIASTOLIC BLOOD PRESSURE: 108 MMHG | SYSTOLIC BLOOD PRESSURE: 194 MMHG

## 2025-02-26 DIAGNOSIS — E66.3 OVERWEIGHT: ICD-10-CM

## 2025-02-26 DIAGNOSIS — Z86.79 PERSONAL HISTORY OF OTHER DISEASES OF THE CIRCULATORY SYSTEM: ICD-10-CM

## 2025-02-26 DIAGNOSIS — I10 ESSENTIAL (PRIMARY) HYPERTENSION: ICD-10-CM

## 2025-02-26 DIAGNOSIS — R19.8 OTHER SPECIFIED SYMPTOMS AND SIGNS INVOLVING THE DIGESTIVE SYSTEM AND ABDOMEN: ICD-10-CM

## 2025-02-26 DIAGNOSIS — Z00.00 ENCOUNTER FOR GENERAL ADULT MEDICAL EXAMINATION WITHOUT ABNORMAL FINDINGS: ICD-10-CM

## 2025-02-26 DIAGNOSIS — Z98.891 HISTORY OF UTERINE SCAR FROM PREVIOUS SURGERY: Chronic | ICD-10-CM

## 2025-02-26 RX ORDER — OMEPRAZOLE 20 MG/1
20 CAPSULE, DELAYED RELEASE ORAL
Qty: 30 | Refills: 0 | Status: ACTIVE | COMMUNITY
Start: 2025-02-26 | End: 1900-01-01

## 2025-02-26 RX ORDER — LISINOPRIL 10 MG/1
10 TABLET ORAL
Qty: 1 | Refills: 0 | Status: ACTIVE | OUTPATIENT
Start: 2025-02-26

## 2025-02-28 PROCEDURE — 84443 ASSAY THYROID STIM HORMONE: CPT

## 2025-02-28 PROCEDURE — 80061 LIPID PANEL: CPT

## 2025-02-28 PROCEDURE — 87338 HPYLORI STOOL AG IA: CPT

## 2025-02-28 PROCEDURE — 85025 COMPLETE CBC W/AUTO DIFF WBC: CPT

## 2025-02-28 PROCEDURE — 83036 HEMOGLOBIN GLYCOSYLATED A1C: CPT

## 2025-02-28 PROCEDURE — G0463: CPT

## 2025-02-28 PROCEDURE — 80053 COMPREHEN METABOLIC PANEL: CPT

## 2025-03-01 ENCOUNTER — APPOINTMENT (OUTPATIENT)
Age: 60
End: 2025-03-01

## 2025-03-03 ENCOUNTER — NON-APPOINTMENT (OUTPATIENT)
Age: 60
End: 2025-03-03

## 2025-03-03 LAB
ALBUMIN SERPL ELPH-MCNC: 4.1 G/DL
ALP BLD-CCNC: 142 U/L
ALT SERPL-CCNC: 25 U/L
ANION GAP SERPL CALC-SCNC: 11 MMOL/L
AST SERPL-CCNC: 17 U/L
BASOPHILS # BLD AUTO: 0.03 K/UL
BASOPHILS NFR BLD AUTO: 0.5 %
BILIRUB SERPL-MCNC: 0.4 MG/DL
BUN SERPL-MCNC: 13 MG/DL
CALCIUM SERPL-MCNC: 9 MG/DL
CHLORIDE SERPL-SCNC: 103 MMOL/L
CHOLEST SERPL-MCNC: 214 MG/DL
CO2 SERPL-SCNC: 26 MMOL/L
CREAT SERPL-MCNC: 0.64 MG/DL
EGFR: 103 ML/MIN/1.73M2
EOSINOPHIL # BLD AUTO: 0.21 K/UL
EOSINOPHIL NFR BLD AUTO: 3.4 %
ESTIMATED AVERAGE GLUCOSE: 140 MG/DL
GLUCOSE SERPL-MCNC: 121 MG/DL
H PYLORI AG STL QL: NEGATIVE
HBA1C MFR BLD HPLC: 6.5 %
HCT VFR BLD CALC: 43.6 %
HDLC SERPL-MCNC: 29 MG/DL
HGB BLD-MCNC: 14.5 G/DL
IMM GRANULOCYTES NFR BLD AUTO: 0.3 %
LDLC SERPL CALC-MCNC: 166 MG/DL
LYMPHOCYTES # BLD AUTO: 1.59 K/UL
LYMPHOCYTES NFR BLD AUTO: 25.4 %
MAN DIFF?: NORMAL
MCHC RBC-ENTMCNC: 27 PG
MCHC RBC-ENTMCNC: 33.3 G/DL
MCV RBC AUTO: 81.2 FL
MONOCYTES # BLD AUTO: 0.5 K/UL
MONOCYTES NFR BLD AUTO: 8 %
NEUTROPHILS # BLD AUTO: 3.9 K/UL
NEUTROPHILS NFR BLD AUTO: 62.4 %
NONHDLC SERPL-MCNC: 185 MG/DL
PLATELET # BLD AUTO: 382 K/UL
POTASSIUM SERPL-SCNC: 4.5 MMOL/L
PROT SERPL-MCNC: 7.4 G/DL
RBC # BLD: 5.37 M/UL
RBC # FLD: 13.5 %
SODIUM SERPL-SCNC: 140 MMOL/L
TRIGL SERPL-MCNC: 106 MG/DL
TSH SERPL-ACNC: 2.87 UIU/ML
WBC # FLD AUTO: 6.25 K/UL

## 2025-03-14 ENCOUNTER — OUTPATIENT (OUTPATIENT)
Dept: OUTPATIENT SERVICES | Facility: HOSPITAL | Age: 60
LOS: 1 days | End: 2025-03-14
Payer: COMMERCIAL

## 2025-03-14 ENCOUNTER — APPOINTMENT (OUTPATIENT)
Age: 60
End: 2025-03-14

## 2025-03-14 VITALS
HEART RATE: 64 BPM | BODY MASS INDEX: 27.81 KG/M2 | DIASTOLIC BLOOD PRESSURE: 81 MMHG | RESPIRATION RATE: 16 BRPM | TEMPERATURE: 97.9 F | SYSTOLIC BLOOD PRESSURE: 213 MMHG | OXYGEN SATURATION: 98 % | WEIGHT: 162 LBS

## 2025-03-14 VITALS — DIASTOLIC BLOOD PRESSURE: 90 MMHG | SYSTOLIC BLOOD PRESSURE: 205 MMHG

## 2025-03-14 DIAGNOSIS — I10 ESSENTIAL (PRIMARY) HYPERTENSION: ICD-10-CM

## 2025-03-14 DIAGNOSIS — Z00.00 ENCOUNTER FOR GENERAL ADULT MEDICAL EXAMINATION WITHOUT ABNORMAL FINDINGS: ICD-10-CM

## 2025-03-14 DIAGNOSIS — E78.5 HYPERLIPIDEMIA, UNSPECIFIED: ICD-10-CM

## 2025-03-14 DIAGNOSIS — Z90.49 ACQUIRED ABSENCE OF OTHER SPECIFIED PARTS OF DIGESTIVE TRACT: Chronic | ICD-10-CM

## 2025-03-14 DIAGNOSIS — R78.5 FINDING OF OTHER PSYCHOTROPIC DRUG IN BLOOD: ICD-10-CM

## 2025-03-14 DIAGNOSIS — Z98.891 HISTORY OF UTERINE SCAR FROM PREVIOUS SURGERY: Chronic | ICD-10-CM

## 2025-03-14 DIAGNOSIS — R74.8 ABNORMAL LEVELS OF OTHER SERUM ENZYMES: ICD-10-CM

## 2025-03-14 DIAGNOSIS — E11.9 TYPE 2 DIABETES MELLITUS W/OUT COMPLICATIONS: ICD-10-CM

## 2025-03-14 DIAGNOSIS — E11.9 TYPE 2 DIABETES MELLITUS WITHOUT COMPLICATIONS: ICD-10-CM

## 2025-03-14 RX ORDER — LISINOPRIL 10 MG/1
10 TABLET ORAL DAILY
Qty: 3 | Refills: 1 | Status: ACTIVE | COMMUNITY
Start: 2025-03-14 | End: 1900-01-01

## 2025-03-14 RX ORDER — METFORMIN HYDROCHLORIDE 500 MG/1
500 TABLET, COATED ORAL
Qty: 60 | Refills: 1 | Status: ACTIVE | COMMUNITY
Start: 2025-03-14 | End: 1900-01-01

## 2025-03-14 RX ORDER — LISINOPRIL 10 MG/1
10 TABLET ORAL DAILY
Qty: 0 | Refills: 0 | Status: COMPLETED | OUTPATIENT
Start: 2025-03-14

## 2025-03-14 RX ORDER — METOPROLOL SUCCINATE 25 MG/1
25 TABLET, EXTENDED RELEASE ORAL
Qty: 30 | Refills: 1 | Status: ACTIVE | COMMUNITY
Start: 2025-03-14 | End: 1900-01-01

## 2025-03-14 RX ORDER — ATORVASTATIN CALCIUM 40 MG/1
40 TABLET, FILM COATED ORAL
Qty: 1 | Refills: 1 | Status: ACTIVE | COMMUNITY
Start: 2025-03-14 | End: 1900-01-01

## 2025-03-14 RX ADMIN — LISINOPRIL 1 MG: 10 TABLET ORAL at 00:00

## 2025-03-18 PROCEDURE — 84080 ASSAY ALKALINE PHOSPHATASES: CPT

## 2025-03-18 PROCEDURE — G0463: CPT

## 2025-03-21 ENCOUNTER — NON-APPOINTMENT (OUTPATIENT)
Age: 60
End: 2025-03-21

## 2025-03-21 LAB
ALP BLD-CCNC: 141 IU/L
ALP BONE CFR SERPL: 58 %
ALP INTEST CFR SERPL: 7 %
ALP LIVER CFR SERPL: 35 %

## 2025-03-24 ENCOUNTER — APPOINTMENT (OUTPATIENT)
Age: 60
End: 2025-03-24

## 2025-03-24 ENCOUNTER — OUTPATIENT (OUTPATIENT)
Dept: OUTPATIENT SERVICES | Facility: HOSPITAL | Age: 60
LOS: 1 days | End: 2025-03-24
Payer: COMMERCIAL

## 2025-03-24 VITALS — SYSTOLIC BLOOD PRESSURE: 160 MMHG | DIASTOLIC BLOOD PRESSURE: 89 MMHG | HEART RATE: 72 BPM

## 2025-03-24 VITALS
TEMPERATURE: 98.3 F | RESPIRATION RATE: 18 BRPM | WEIGHT: 165 LBS | OXYGEN SATURATION: 98 % | BODY MASS INDEX: 28.32 KG/M2 | HEART RATE: 80 BPM | DIASTOLIC BLOOD PRESSURE: 107 MMHG | SYSTOLIC BLOOD PRESSURE: 195 MMHG

## 2025-03-24 VITALS — SYSTOLIC BLOOD PRESSURE: 180 MMHG | HEART RATE: 70 BPM | DIASTOLIC BLOOD PRESSURE: 112 MMHG

## 2025-03-24 DIAGNOSIS — I10 ESSENTIAL (PRIMARY) HYPERTENSION: ICD-10-CM

## 2025-03-24 DIAGNOSIS — Z00.00 ENCOUNTER FOR GENERAL ADULT MEDICAL EXAMINATION WITHOUT ABNORMAL FINDINGS: ICD-10-CM

## 2025-03-24 DIAGNOSIS — Z98.891 HISTORY OF UTERINE SCAR FROM PREVIOUS SURGERY: Chronic | ICD-10-CM

## 2025-03-24 DIAGNOSIS — Z90.49 ACQUIRED ABSENCE OF OTHER SPECIFIED PARTS OF DIGESTIVE TRACT: Chronic | ICD-10-CM

## 2025-03-24 PROCEDURE — G0463: CPT

## 2025-03-24 RX ORDER — LISINOPRIL 10 MG/1
10 TABLET ORAL
Qty: 0 | Refills: 0 | Status: COMPLETED | OUTPATIENT
Start: 2025-03-24

## 2025-03-24 RX ADMIN — LISINOPRIL 1 MG: 10 TABLET ORAL at 00:00

## 2025-03-27 ENCOUNTER — NON-APPOINTMENT (OUTPATIENT)
Age: 60
End: 2025-03-27

## 2025-03-31 ENCOUNTER — APPOINTMENT (OUTPATIENT)
Age: 60
End: 2025-03-31

## 2025-04-17 ENCOUNTER — APPOINTMENT (OUTPATIENT)
Dept: ULTRASOUND IMAGING | Facility: HOSPITAL | Age: 60
End: 2025-04-17

## 2025-04-17 ENCOUNTER — NON-APPOINTMENT (OUTPATIENT)
Age: 60
End: 2025-04-17

## 2025-04-17 ENCOUNTER — OUTPATIENT (OUTPATIENT)
Dept: OUTPATIENT SERVICES | Facility: HOSPITAL | Age: 60
LOS: 1 days | End: 2025-04-17
Payer: COMMERCIAL

## 2025-04-17 DIAGNOSIS — E78.5 HYPERLIPIDEMIA, UNSPECIFIED: ICD-10-CM

## 2025-04-17 DIAGNOSIS — K76.0 FATTY (CHANGE OF) LIVER, NOT ELSEWHERE CLASSIFIED: ICD-10-CM

## 2025-04-17 DIAGNOSIS — Z90.49 ACQUIRED ABSENCE OF OTHER SPECIFIED PARTS OF DIGESTIVE TRACT: Chronic | ICD-10-CM

## 2025-04-17 DIAGNOSIS — Z98.891 HISTORY OF UTERINE SCAR FROM PREVIOUS SURGERY: Chronic | ICD-10-CM

## 2025-04-17 PROCEDURE — 76705 ECHO EXAM OF ABDOMEN: CPT

## 2025-04-17 PROCEDURE — 76705 ECHO EXAM OF ABDOMEN: CPT | Mod: 26

## 2025-04-23 ENCOUNTER — APPOINTMENT (OUTPATIENT)
Age: 60
End: 2025-04-23

## 2025-04-23 ENCOUNTER — OUTPATIENT (OUTPATIENT)
Dept: OUTPATIENT SERVICES | Facility: HOSPITAL | Age: 60
LOS: 1 days | End: 2025-04-23
Payer: COMMERCIAL

## 2025-04-23 VITALS — DIASTOLIC BLOOD PRESSURE: 86 MMHG | HEART RATE: 70 BPM | SYSTOLIC BLOOD PRESSURE: 174 MMHG

## 2025-04-23 VITALS — DIASTOLIC BLOOD PRESSURE: 90 MMHG | SYSTOLIC BLOOD PRESSURE: 176 MMHG

## 2025-04-23 DIAGNOSIS — Z90.49 ACQUIRED ABSENCE OF OTHER SPECIFIED PARTS OF DIGESTIVE TRACT: Chronic | ICD-10-CM

## 2025-04-23 DIAGNOSIS — I10 ESSENTIAL (PRIMARY) HYPERTENSION: ICD-10-CM

## 2025-04-23 DIAGNOSIS — N28.9 DISORDER OF KIDNEY AND URETER, UNSPECIFIED: ICD-10-CM

## 2025-04-23 DIAGNOSIS — Z00.00 ENCOUNTER FOR GENERAL ADULT MEDICAL EXAMINATION WITHOUT ABNORMAL FINDINGS: ICD-10-CM

## 2025-04-23 DIAGNOSIS — Z98.891 HISTORY OF UTERINE SCAR FROM PREVIOUS SURGERY: Chronic | ICD-10-CM

## 2025-04-23 PROCEDURE — G0463: CPT

## 2025-04-23 RX ORDER — HYDROCHLOROTHIAZIDE 12.5 MG/1
12.5 TABLET ORAL
Qty: 30 | Refills: 0 | Status: ACTIVE | COMMUNITY
Start: 2025-04-23 | End: 1900-01-01

## 2025-04-23 RX ORDER — LISINOPRIL 20 MG/1
20 TABLET ORAL
Qty: 90 | Refills: 1 | Status: ACTIVE | COMMUNITY
Start: 2025-04-23 | End: 1900-01-01

## 2025-05-09 ENCOUNTER — APPOINTMENT (OUTPATIENT)
Age: 60
End: 2025-05-09

## 2025-05-12 ENCOUNTER — RESULT REVIEW (OUTPATIENT)
Age: 60
End: 2025-05-12

## 2025-05-14 ENCOUNTER — APPOINTMENT (OUTPATIENT)
Dept: MRI IMAGING | Facility: HOSPITAL | Age: 60
End: 2025-05-14

## 2025-05-14 ENCOUNTER — OUTPATIENT (OUTPATIENT)
Dept: OUTPATIENT SERVICES | Facility: HOSPITAL | Age: 60
LOS: 1 days | End: 2025-05-14
Payer: COMMERCIAL

## 2025-05-14 DIAGNOSIS — N28.9 DISORDER OF KIDNEY AND URETER, UNSPECIFIED: ICD-10-CM

## 2025-05-14 DIAGNOSIS — Z98.891 HISTORY OF UTERINE SCAR FROM PREVIOUS SURGERY: Chronic | ICD-10-CM

## 2025-05-14 PROCEDURE — 72197 MRI PELVIS W/O & W/DYE: CPT | Mod: 26

## 2025-05-14 PROCEDURE — 74183 MRI ABD W/O CNTR FLWD CNTR: CPT

## 2025-05-14 PROCEDURE — A9579: CPT

## 2025-05-14 PROCEDURE — 74183 MRI ABD W/O CNTR FLWD CNTR: CPT | Mod: 26

## 2025-05-14 PROCEDURE — 72197 MRI PELVIS W/O & W/DYE: CPT

## 2025-05-23 ENCOUNTER — NON-APPOINTMENT (OUTPATIENT)
Age: 60
End: 2025-05-23

## 2025-06-16 ENCOUNTER — APPOINTMENT (OUTPATIENT)
Age: 60
End: 2025-06-16

## 2025-06-16 ENCOUNTER — OUTPATIENT (OUTPATIENT)
Dept: OUTPATIENT SERVICES | Facility: HOSPITAL | Age: 60
LOS: 1 days | End: 2025-06-16
Payer: COMMERCIAL

## 2025-06-16 VITALS
HEART RATE: 65 BPM | DIASTOLIC BLOOD PRESSURE: 79 MMHG | RESPIRATION RATE: 16 BRPM | OXYGEN SATURATION: 98 % | SYSTOLIC BLOOD PRESSURE: 179 MMHG | TEMPERATURE: 97.4 F

## 2025-06-16 VITALS — SYSTOLIC BLOOD PRESSURE: 164 MMHG | DIASTOLIC BLOOD PRESSURE: 75 MMHG

## 2025-06-16 DIAGNOSIS — Z00.00 ENCOUNTER FOR GENERAL ADULT MEDICAL EXAMINATION WITHOUT ABNORMAL FINDINGS: ICD-10-CM

## 2025-06-16 DIAGNOSIS — I10 ESSENTIAL (PRIMARY) HYPERTENSION: ICD-10-CM

## 2025-06-16 DIAGNOSIS — Z98.891 HISTORY OF UTERINE SCAR FROM PREVIOUS SURGERY: Chronic | ICD-10-CM

## 2025-06-16 DIAGNOSIS — Z90.49 ACQUIRED ABSENCE OF OTHER SPECIFIED PARTS OF DIGESTIVE TRACT: Chronic | ICD-10-CM

## 2025-06-16 RX ORDER — LISINOPRIL 40 MG/1
40 TABLET ORAL
Qty: 30 | Refills: 0 | Status: ACTIVE | COMMUNITY
Start: 2025-06-16 | End: 1900-01-01

## 2025-06-24 PROCEDURE — 36415 COLL VENOUS BLD VENIPUNCTURE: CPT

## 2025-06-24 PROCEDURE — G0463: CPT

## 2025-06-24 PROCEDURE — 82088 ASSAY OF ALDOSTERONE: CPT

## 2025-06-26 LAB — ALDOSTERONE SERUM: 10.1 NG/DL

## 2025-06-30 ENCOUNTER — APPOINTMENT (OUTPATIENT)
Age: 60
End: 2025-06-30

## 2025-09-11 ENCOUNTER — EMERGENCY (EMERGENCY)
Facility: HOSPITAL | Age: 60
LOS: 1 days | End: 2025-09-11
Attending: EMERGENCY MEDICINE | Admitting: EMERGENCY MEDICINE
Payer: SELF-PAY

## 2025-09-11 ENCOUNTER — APPOINTMENT (OUTPATIENT)
Age: 60
End: 2025-09-11

## 2025-09-11 VITALS
OXYGEN SATURATION: 99 % | SYSTOLIC BLOOD PRESSURE: 204 MMHG | HEART RATE: 62 BPM | RESPIRATION RATE: 16 BRPM | WEIGHT: 145.06 LBS | DIASTOLIC BLOOD PRESSURE: 97 MMHG | HEIGHT: 62 IN | TEMPERATURE: 97 F

## 2025-09-11 VITALS
OXYGEN SATURATION: 97 % | WEIGHT: 166 LBS | SYSTOLIC BLOOD PRESSURE: 191 MMHG | RESPIRATION RATE: 16 BRPM | DIASTOLIC BLOOD PRESSURE: 80 MMHG | BODY MASS INDEX: 28.49 KG/M2 | TEMPERATURE: 98 F

## 2025-09-11 VITALS — HEART RATE: 67 BPM | SYSTOLIC BLOOD PRESSURE: 161 MMHG | DIASTOLIC BLOOD PRESSURE: 88 MMHG

## 2025-09-11 DIAGNOSIS — I16.0 HYPERTENSIVE URGENCY: ICD-10-CM

## 2025-09-11 DIAGNOSIS — Z90.49 ACQUIRED ABSENCE OF OTHER SPECIFIED PARTS OF DIGESTIVE TRACT: Chronic | ICD-10-CM

## 2025-09-11 DIAGNOSIS — Z98.891 HISTORY OF UTERINE SCAR FROM PREVIOUS SURGERY: Chronic | ICD-10-CM

## 2025-09-11 LAB
ALBUMIN SERPL ELPH-MCNC: 3.8 G/DL — SIGNIFICANT CHANGE UP (ref 3.3–5)
ALP SERPL-CCNC: 150 U/L — HIGH (ref 40–120)
ALT FLD-CCNC: 30 U/L — SIGNIFICANT CHANGE UP (ref 10–45)
ANION GAP SERPL CALC-SCNC: 4 MMOL/L — LOW (ref 5–17)
AST SERPL-CCNC: 16 U/L — SIGNIFICANT CHANGE UP (ref 10–40)
BASOPHILS # BLD AUTO: 0.07 K/UL — SIGNIFICANT CHANGE UP (ref 0–0.2)
BASOPHILS NFR BLD AUTO: 0.8 % — SIGNIFICANT CHANGE UP (ref 0–2)
BILIRUB SERPL-MCNC: 0.4 MG/DL — SIGNIFICANT CHANGE UP (ref 0.2–1.2)
BUN SERPL-MCNC: 16 MG/DL — SIGNIFICANT CHANGE UP (ref 7–23)
CALCIUM SERPL-MCNC: 9.1 MG/DL — SIGNIFICANT CHANGE UP (ref 8.4–10.5)
CHLORIDE SERPL-SCNC: 103 MMOL/L — SIGNIFICANT CHANGE UP (ref 96–108)
CO2 SERPL-SCNC: 30 MMOL/L — SIGNIFICANT CHANGE UP (ref 22–31)
CREAT SERPL-MCNC: 0.63 MG/DL — SIGNIFICANT CHANGE UP (ref 0.5–1.3)
EGFR: 103 ML/MIN/1.73M2 — SIGNIFICANT CHANGE UP
EGFR: 103 ML/MIN/1.73M2 — SIGNIFICANT CHANGE UP
EOSINOPHIL # BLD AUTO: 0.27 K/UL — SIGNIFICANT CHANGE UP (ref 0–0.5)
EOSINOPHIL NFR BLD AUTO: 3 % — SIGNIFICANT CHANGE UP (ref 0–6)
GLUCOSE SERPL-MCNC: 107 MG/DL — HIGH (ref 70–99)
HCT VFR BLD CALC: 43 % — SIGNIFICANT CHANGE UP (ref 34.5–45)
HGB BLD-MCNC: 14.6 G/DL — SIGNIFICANT CHANGE UP (ref 11.5–15.5)
IMM GRANULOCYTES NFR BLD AUTO: 0.3 % — SIGNIFICANT CHANGE UP (ref 0–0.9)
LIDOCAIN IGE QN: 35 U/L — SIGNIFICANT CHANGE UP (ref 16–77)
LYMPHOCYTES # BLD AUTO: 2.38 K/UL — SIGNIFICANT CHANGE UP (ref 1–3.3)
LYMPHOCYTES # BLD AUTO: 26.2 % — SIGNIFICANT CHANGE UP (ref 13–44)
MAGNESIUM SERPL-MCNC: 2.2 MG/DL — SIGNIFICANT CHANGE UP (ref 1.6–2.6)
MCHC RBC-ENTMCNC: 28.8 PG — SIGNIFICANT CHANGE UP (ref 27–34)
MCHC RBC-ENTMCNC: 34 G/DL — SIGNIFICANT CHANGE UP (ref 32–36)
MCV RBC AUTO: 84.8 FL — SIGNIFICANT CHANGE UP (ref 80–100)
MONOCYTES # BLD AUTO: 0.69 K/UL — SIGNIFICANT CHANGE UP (ref 0–0.9)
MONOCYTES NFR BLD AUTO: 7.6 % — SIGNIFICANT CHANGE UP (ref 2–14)
NEUTROPHILS # BLD AUTO: 5.64 K/UL — SIGNIFICANT CHANGE UP (ref 1.8–7.4)
NEUTROPHILS NFR BLD AUTO: 62.1 % — SIGNIFICANT CHANGE UP (ref 43–77)
NRBC BLD AUTO-RTO: 0 /100 WBCS — SIGNIFICANT CHANGE UP (ref 0–0)
NT-PROBNP SERPL-SCNC: 70 PG/ML — SIGNIFICANT CHANGE UP (ref 0–300)
PLATELET # BLD AUTO: 362 K/UL — SIGNIFICANT CHANGE UP (ref 150–400)
POTASSIUM SERPL-MCNC: 3.8 MMOL/L — SIGNIFICANT CHANGE UP (ref 3.5–5.3)
POTASSIUM SERPL-SCNC: 3.8 MMOL/L — SIGNIFICANT CHANGE UP (ref 3.5–5.3)
PROT SERPL-MCNC: 8 G/DL — SIGNIFICANT CHANGE UP (ref 6–8.3)
RBC # BLD: 5.07 M/UL — SIGNIFICANT CHANGE UP (ref 3.8–5.2)
RBC # FLD: 13 % — SIGNIFICANT CHANGE UP (ref 10.3–14.5)
SODIUM SERPL-SCNC: 137 MMOL/L — SIGNIFICANT CHANGE UP (ref 135–145)
TROPONIN I, HIGH SENSITIVITY RESULT: 10.3 NG/L — SIGNIFICANT CHANGE UP
WBC # BLD: 9.08 K/UL — SIGNIFICANT CHANGE UP (ref 3.8–10.5)
WBC # FLD AUTO: 9.08 K/UL — SIGNIFICANT CHANGE UP (ref 3.8–10.5)

## 2025-09-11 PROCEDURE — 71045 X-RAY EXAM CHEST 1 VIEW: CPT | Mod: 26

## 2025-09-11 PROCEDURE — 36415 COLL VENOUS BLD VENIPUNCTURE: CPT

## 2025-09-11 PROCEDURE — 84484 ASSAY OF TROPONIN QUANT: CPT

## 2025-09-11 PROCEDURE — 70450 CT HEAD/BRAIN W/O DYE: CPT | Mod: 26

## 2025-09-11 PROCEDURE — 83690 ASSAY OF LIPASE: CPT

## 2025-09-11 PROCEDURE — 83880 ASSAY OF NATRIURETIC PEPTIDE: CPT

## 2025-09-11 PROCEDURE — 93010 ELECTROCARDIOGRAM REPORT: CPT

## 2025-09-11 PROCEDURE — 80053 COMPREHEN METABOLIC PANEL: CPT

## 2025-09-11 PROCEDURE — 93005 ELECTROCARDIOGRAM TRACING: CPT

## 2025-09-11 PROCEDURE — 83735 ASSAY OF MAGNESIUM: CPT

## 2025-09-11 PROCEDURE — 70450 CT HEAD/BRAIN W/O DYE: CPT

## 2025-09-11 PROCEDURE — 85025 COMPLETE CBC W/AUTO DIFF WBC: CPT

## 2025-09-11 PROCEDURE — 73562 X-RAY EXAM OF KNEE 3: CPT

## 2025-09-11 PROCEDURE — 99284 EMERGENCY DEPT VISIT MOD MDM: CPT

## 2025-09-11 PROCEDURE — 73562 X-RAY EXAM OF KNEE 3: CPT | Mod: 26,RT

## 2025-09-11 PROCEDURE — 71045 X-RAY EXAM CHEST 1 VIEW: CPT

## 2025-09-11 RX ORDER — HYDROCHLOROTHIAZIDE 50 MG/1
1 TABLET ORAL
Qty: 30 | Refills: 0
Start: 2025-09-11

## 2025-09-11 RX ORDER — METOPROLOL SUCCINATE 50 MG/1
1 TABLET, EXTENDED RELEASE ORAL
Qty: 30 | Refills: 0
Start: 2025-09-11

## 2025-09-11 RX ORDER — LISINOPRIL 5 MG/1
1 TABLET ORAL
Qty: 30 | Refills: 0
Start: 2025-09-11

## 2025-09-11 RX ORDER — LISINOPRIL 5 MG/1
40 TABLET ORAL ONCE
Refills: 0 | Status: COMPLETED | OUTPATIENT
Start: 2025-09-11 | End: 2025-09-11

## 2025-09-11 RX ORDER — METOPROLOL SUCCINATE 50 MG/1
25 TABLET, EXTENDED RELEASE ORAL ONCE
Refills: 0 | Status: COMPLETED | OUTPATIENT
Start: 2025-09-11 | End: 2025-09-11

## 2025-09-11 RX ADMIN — METOPROLOL SUCCINATE 25 MILLIGRAM(S): 50 TABLET, EXTENDED RELEASE ORAL at 19:19

## 2025-09-11 RX ADMIN — LISINOPRIL 40 MILLIGRAM(S): 5 TABLET ORAL at 19:18

## 2025-09-15 PROBLEM — E11.9 TYPE 2 DIABETES MELLITUS WITHOUT COMPLICATIONS: Chronic | Status: ACTIVE | Noted: 2025-09-11

## 2025-09-16 ENCOUNTER — MED ADMIN CHARGE (OUTPATIENT)
Age: 60
End: 2025-09-16

## 2025-09-16 ENCOUNTER — RESULT CHARGE (OUTPATIENT)
Age: 60
End: 2025-09-16

## 2025-09-16 ENCOUNTER — APPOINTMENT (OUTPATIENT)
Age: 60
End: 2025-09-16

## 2025-09-16 VITALS
HEART RATE: 55 BPM | DIASTOLIC BLOOD PRESSURE: 87 MMHG | RESPIRATION RATE: 16 BRPM | TEMPERATURE: 97.7 F | OXYGEN SATURATION: 98 % | SYSTOLIC BLOOD PRESSURE: 177 MMHG

## 2025-09-16 VITALS — HEART RATE: 55 BPM | SYSTOLIC BLOOD PRESSURE: 193 MMHG | TEMPERATURE: 97.7 F | DIASTOLIC BLOOD PRESSURE: 79 MMHG

## 2025-09-16 DIAGNOSIS — M25.561 PAIN IN RIGHT KNEE: ICD-10-CM

## 2025-09-16 DIAGNOSIS — I10 ESSENTIAL (PRIMARY) HYPERTENSION: ICD-10-CM

## 2025-09-16 DIAGNOSIS — E11.9 TYPE 2 DIABETES MELLITUS W/OUT COMPLICATIONS: ICD-10-CM

## 2025-09-16 DIAGNOSIS — Z23 ENCOUNTER FOR IMMUNIZATION: ICD-10-CM

## 2025-09-17 LAB
ALBUMIN, RANDOM URINE: <1.2 MG/DL
CREAT SPEC-SCNC: 70 MG/DL
HBA1C MFR BLD HPLC: 6.5
MICROALBUMIN/CREAT 24H UR-RTO: NORMAL MG/G

## (undated) DEVICE — FORCEP RADIAL JAW 4 240CM DISP

## (undated) DEVICE — KIT ENDO PROCEDURE CUST W/VLV

## (undated) DEVICE — TUBING CAP SET ERBEFLO CLEVERCAP HYBRID CO2 FOR OLYMPUS SCOPES AND UCR

## (undated) DEVICE — CONTAINER FORMALIN BUFF 10% 60ML

## (undated) DEVICE — SOL IRR POUR H2O 1000ML

## (undated) DEVICE — ENDOCUFF VISION SZ 2 LG GRN